# Patient Record
Sex: FEMALE | Race: WHITE | NOT HISPANIC OR LATINO | URBAN - METROPOLITAN AREA
[De-identification: names, ages, dates, MRNs, and addresses within clinical notes are randomized per-mention and may not be internally consistent; named-entity substitution may affect disease eponyms.]

---

## 2022-11-04 ENCOUNTER — EMERGENCY (EMERGENCY)
Facility: HOSPITAL | Age: 82
LOS: 0 days | Discharge: HOME | End: 2022-11-05
Attending: EMERGENCY MEDICINE
Payer: MEDICARE

## 2022-11-04 VITALS
DIASTOLIC BLOOD PRESSURE: 74 MMHG | OXYGEN SATURATION: 100 % | RESPIRATION RATE: 20 BRPM | HEART RATE: 67 BPM | TEMPERATURE: 98 F | SYSTOLIC BLOOD PRESSURE: 172 MMHG | WEIGHT: 134.92 LBS

## 2022-11-04 DIAGNOSIS — R51.9 HEADACHE, UNSPECIFIED: ICD-10-CM

## 2022-11-04 DIAGNOSIS — U07.1 COVID-19: ICD-10-CM

## 2022-11-04 DIAGNOSIS — R05.9 COUGH, UNSPECIFIED: ICD-10-CM

## 2022-11-04 PROCEDURE — 99283 EMERGENCY DEPT VISIT LOW MDM: CPT | Mod: CS

## 2022-11-04 PROCEDURE — 99282 EMERGENCY DEPT VISIT SF MDM: CPT

## 2022-11-04 NOTE — ED ADULT TRIAGE NOTE - CHIEF COMPLAINT QUOTE
Pt. presents to ED from home c/o cough, body aches, and headache since yesterday. Pt. states symptoms began yesterday. Pt. endorses + at home covid test today. Denies CP. Endorses "a little" SOB

## 2022-11-05 VITALS
SYSTOLIC BLOOD PRESSURE: 167 MMHG | HEART RATE: 65 BPM | RESPIRATION RATE: 18 BRPM | OXYGEN SATURATION: 100 % | DIASTOLIC BLOOD PRESSURE: 72 MMHG

## 2022-11-05 RX ORDER — ACETAMINOPHEN 500 MG
650 TABLET ORAL ONCE
Refills: 0 | Status: COMPLETED | OUTPATIENT
Start: 2022-11-05 | End: 2022-11-05

## 2022-11-05 RX ADMIN — Medication 650 MILLIGRAM(S): at 00:48

## 2022-11-05 NOTE — ED PROVIDER NOTE - NSFOLLOWUPINSTRUCTIONS_ED_ALL_ED_FT
please follow up with your doctor in 5 days.   please self isolate as directed.  take tylenol 650 mg every fours for fever and pain.   drink plenty of fluids.   return to ED for any concerns

## 2022-11-05 NOTE — ED PROVIDER NOTE - OBJECTIVE STATEMENT
81 y/o female in ED c/o cough, congestion, myalgia and HA x 2-3 days.   states took COVID test at home today and found to be positive.   family states pt with decrease appetite.  no sick contacts.   tolerating PO.   no meds taken for symptoms.

## 2022-11-05 NOTE — ED PROVIDER NOTE - CLINICAL SUMMARY MEDICAL DECISION MAKING FREE TEXT BOX
pt with uri symptoms testing positive for COVID at home.   well appearing.   VS stable.   will give tylenol and have f/u.    d/w family and agree with plan and will f/u

## 2022-11-05 NOTE — ED ADULT NURSE NOTE - OBJECTIVE STATEMENT
81 y/o female awake alert and oriented x4 presents to ED c/o headache x 2-3 days. + cough, congestion and chills. Tested positive with home covid test. Denies sick contact. Denies chest pain, sob, nausea, vomiting.

## 2022-11-05 NOTE — ED PROVIDER NOTE - PATIENT PORTAL LINK FT
You can access the FollowMyHealth Patient Portal offered by [facility] by registering at the following website: [email or web address]. By joining [facility]’s FollowMyHealth portal, you will also be able to view your health information using other applications (apps) compatible with our system.

## 2022-11-11 ENCOUNTER — INPATIENT (INPATIENT)
Facility: HOSPITAL | Age: 82
LOS: 12 days | Discharge: HOME HEALTH | DRG: 177 | End: 2022-11-24
Attending: FAMILY MEDICINE | Admitting: INTERNAL MEDICINE
Payer: MEDICARE

## 2022-11-11 VITALS
RESPIRATION RATE: 20 BRPM | OXYGEN SATURATION: 99 % | SYSTOLIC BLOOD PRESSURE: 130 MMHG | HEART RATE: 53 BPM | TEMPERATURE: 98 F | DIASTOLIC BLOOD PRESSURE: 59 MMHG

## 2022-11-11 DIAGNOSIS — U07.1 COVID-19: ICD-10-CM

## 2022-11-11 LAB
ALBUMIN SERPL ELPH-MCNC: 3.4 G/DL — SIGNIFICANT CHANGE UP (ref 3.3–5)
ALP SERPL-CCNC: 119 U/L — SIGNIFICANT CHANGE UP (ref 40–120)
ALT FLD-CCNC: 23 U/L — SIGNIFICANT CHANGE UP (ref 12–78)
ANION GAP SERPL CALC-SCNC: 10 MMOL/L — SIGNIFICANT CHANGE UP (ref 5–17)
AST SERPL-CCNC: 27 U/L — SIGNIFICANT CHANGE UP (ref 15–37)
BASOPHILS # BLD AUTO: 0.01 K/UL — SIGNIFICANT CHANGE UP (ref 0–0.2)
BASOPHILS NFR BLD AUTO: 0.3 % — SIGNIFICANT CHANGE UP (ref 0–2)
BILIRUB SERPL-MCNC: 0.5 MG/DL — SIGNIFICANT CHANGE UP (ref 0.2–1.2)
BUN SERPL-MCNC: 23 MG/DL — SIGNIFICANT CHANGE UP (ref 7–23)
CALCIUM SERPL-MCNC: 9 MG/DL — SIGNIFICANT CHANGE UP (ref 8.5–10.1)
CHLORIDE SERPL-SCNC: 106 MMOL/L — SIGNIFICANT CHANGE UP (ref 96–108)
CO2 SERPL-SCNC: 21 MMOL/L — LOW (ref 22–31)
CREAT SERPL-MCNC: 1.04 MG/DL — SIGNIFICANT CHANGE UP (ref 0.5–1.3)
EGFR: 53 ML/MIN/1.73M2 — LOW
EOSINOPHIL # BLD AUTO: 0 K/UL — SIGNIFICANT CHANGE UP (ref 0–0.5)
EOSINOPHIL NFR BLD AUTO: 0 % — SIGNIFICANT CHANGE UP (ref 0–6)
FLUAV AG NPH QL: SIGNIFICANT CHANGE UP
FLUBV AG NPH QL: SIGNIFICANT CHANGE UP
GLUCOSE SERPL-MCNC: 147 MG/DL — HIGH (ref 70–99)
HCT VFR BLD CALC: 37.7 % — SIGNIFICANT CHANGE UP (ref 34.5–45)
HGB BLD-MCNC: 13 G/DL — SIGNIFICANT CHANGE UP (ref 11.5–15.5)
IMM GRANULOCYTES NFR BLD AUTO: 0.3 % — SIGNIFICANT CHANGE UP (ref 0–0.9)
LACTATE SERPL-SCNC: 1.8 MMOL/L — SIGNIFICANT CHANGE UP (ref 0.7–2)
LYMPHOCYTES # BLD AUTO: 0.81 K/UL — LOW (ref 1–3.3)
LYMPHOCYTES # BLD AUTO: 25.3 % — SIGNIFICANT CHANGE UP (ref 13–44)
MAGNESIUM SERPL-MCNC: 2 MG/DL — SIGNIFICANT CHANGE UP (ref 1.6–2.6)
MCHC RBC-ENTMCNC: 28.3 PG — SIGNIFICANT CHANGE UP (ref 27–34)
MCHC RBC-ENTMCNC: 34.5 GM/DL — SIGNIFICANT CHANGE UP (ref 32–36)
MCV RBC AUTO: 82.1 FL — SIGNIFICANT CHANGE UP (ref 80–100)
MONOCYTES # BLD AUTO: 0.3 K/UL — SIGNIFICANT CHANGE UP (ref 0–0.9)
MONOCYTES NFR BLD AUTO: 9.4 % — SIGNIFICANT CHANGE UP (ref 2–14)
NEUTROPHILS # BLD AUTO: 2.07 K/UL — SIGNIFICANT CHANGE UP (ref 1.8–7.4)
NEUTROPHILS NFR BLD AUTO: 64.7 % — SIGNIFICANT CHANGE UP (ref 43–77)
PLATELET # BLD AUTO: 140 K/UL — LOW (ref 150–400)
POTASSIUM SERPL-MCNC: 3.8 MMOL/L — SIGNIFICANT CHANGE UP (ref 3.5–5.3)
POTASSIUM SERPL-SCNC: 3.8 MMOL/L — SIGNIFICANT CHANGE UP (ref 3.5–5.3)
PROT SERPL-MCNC: 7.5 GM/DL — SIGNIFICANT CHANGE UP (ref 6–8.3)
RBC # BLD: 4.59 M/UL — SIGNIFICANT CHANGE UP (ref 3.8–5.2)
RBC # FLD: 14.1 % — SIGNIFICANT CHANGE UP (ref 10.3–14.5)
RSV RNA NPH QL NAA+NON-PROBE: SIGNIFICANT CHANGE UP
SARS-COV-2 RNA SPEC QL NAA+PROBE: DETECTED
SODIUM SERPL-SCNC: 137 MMOL/L — SIGNIFICANT CHANGE UP (ref 135–145)
TROPONIN I, HIGH SENSITIVITY RESULT: 11.71 NG/L — SIGNIFICANT CHANGE UP
WBC # BLD: 3.2 K/UL — LOW (ref 3.8–10.5)
WBC # FLD AUTO: 3.2 K/UL — LOW (ref 3.8–10.5)

## 2022-11-11 PROCEDURE — 85027 COMPLETE CBC AUTOMATED: CPT

## 2022-11-11 PROCEDURE — 70450 CT HEAD/BRAIN W/O DYE: CPT

## 2022-11-11 PROCEDURE — 83615 LACTATE (LD) (LDH) ENZYME: CPT

## 2022-11-11 PROCEDURE — 87077 CULTURE AEROBIC IDENTIFY: CPT

## 2022-11-11 PROCEDURE — 94760 N-INVAS EAR/PLS OXIMETRY 1: CPT

## 2022-11-11 PROCEDURE — 85652 RBC SED RATE AUTOMATED: CPT

## 2022-11-11 PROCEDURE — 36600 WITHDRAWAL OF ARTERIAL BLOOD: CPT

## 2022-11-11 PROCEDURE — 99223 1ST HOSP IP/OBS HIGH 75: CPT

## 2022-11-11 PROCEDURE — 82962 GLUCOSE BLOOD TEST: CPT

## 2022-11-11 PROCEDURE — 84443 ASSAY THYROID STIM HORMONE: CPT

## 2022-11-11 PROCEDURE — 84439 ASSAY OF FREE THYROXINE: CPT

## 2022-11-11 PROCEDURE — 82803 BLOOD GASES ANY COMBINATION: CPT

## 2022-11-11 PROCEDURE — 93306 TTE W/DOPPLER COMPLETE: CPT

## 2022-11-11 PROCEDURE — 85610 PROTHROMBIN TIME: CPT

## 2022-11-11 PROCEDURE — 84480 ASSAY TRIIODOTHYRONINE (T3): CPT

## 2022-11-11 PROCEDURE — 80048 BASIC METABOLIC PNL TOTAL CA: CPT

## 2022-11-11 PROCEDURE — 93970 EXTREMITY STUDY: CPT

## 2022-11-11 PROCEDURE — 86140 C-REACTIVE PROTEIN: CPT

## 2022-11-11 PROCEDURE — 71045 X-RAY EXAM CHEST 1 VIEW: CPT | Mod: 26

## 2022-11-11 PROCEDURE — 82565 ASSAY OF CREATININE: CPT

## 2022-11-11 PROCEDURE — 97163 PT EVAL HIGH COMPLEX 45 MIN: CPT | Mod: GP

## 2022-11-11 PROCEDURE — 82728 ASSAY OF FERRITIN: CPT

## 2022-11-11 PROCEDURE — 99285 EMERGENCY DEPT VISIT HI MDM: CPT | Mod: CS

## 2022-11-11 PROCEDURE — 94761 N-INVAS EAR/PLS OXIMETRY MLT: CPT

## 2022-11-11 PROCEDURE — 97116 GAIT TRAINING THERAPY: CPT | Mod: GP

## 2022-11-11 PROCEDURE — 81001 URINALYSIS AUTO W/SCOPE: CPT

## 2022-11-11 PROCEDURE — 36415 COLL VENOUS BLD VENIPUNCTURE: CPT

## 2022-11-11 PROCEDURE — 80076 HEPATIC FUNCTION PANEL: CPT

## 2022-11-11 PROCEDURE — 87086 URINE CULTURE/COLONY COUNT: CPT

## 2022-11-11 PROCEDURE — 85379 FIBRIN DEGRADATION QUANT: CPT

## 2022-11-11 PROCEDURE — 71045 X-RAY EXAM CHEST 1 VIEW: CPT

## 2022-11-11 PROCEDURE — 87186 SC STD MICRODIL/AGAR DIL: CPT

## 2022-11-11 PROCEDURE — 71275 CT ANGIOGRAPHY CHEST: CPT

## 2022-11-11 RX ORDER — ENOXAPARIN SODIUM 100 MG/ML
40 INJECTION SUBCUTANEOUS EVERY 24 HOURS
Refills: 0 | Status: DISCONTINUED | OUTPATIENT
Start: 2022-11-11 | End: 2022-11-16

## 2022-11-11 RX ORDER — FAMOTIDINE 10 MG/ML
20 INJECTION INTRAVENOUS ONCE
Refills: 0 | Status: COMPLETED | OUTPATIENT
Start: 2022-11-11 | End: 2022-11-11

## 2022-11-11 RX ORDER — ACETAMINOPHEN 500 MG
650 TABLET ORAL EVERY 6 HOURS
Refills: 0 | Status: DISCONTINUED | OUTPATIENT
Start: 2022-11-11 | End: 2022-11-24

## 2022-11-11 RX ORDER — REMDESIVIR 5 MG/ML
INJECTION INTRAVENOUS
Refills: 0 | Status: COMPLETED | OUTPATIENT
Start: 2022-11-11 | End: 2022-11-15

## 2022-11-11 RX ORDER — ONDANSETRON 8 MG/1
4 TABLET, FILM COATED ORAL EVERY 8 HOURS
Refills: 0 | Status: DISCONTINUED | OUTPATIENT
Start: 2022-11-11 | End: 2022-11-24

## 2022-11-11 RX ORDER — DEXAMETHASONE 0.5 MG/5ML
6 ELIXIR ORAL DAILY
Refills: 0 | Status: DISCONTINUED | OUTPATIENT
Start: 2022-11-12 | End: 2022-11-15

## 2022-11-11 RX ORDER — HALOPERIDOL DECANOATE 100 MG/ML
5 INJECTION INTRAMUSCULAR ONCE
Refills: 0 | Status: DISCONTINUED | OUTPATIENT
Start: 2022-11-11 | End: 2022-11-13

## 2022-11-11 RX ORDER — REMDESIVIR 5 MG/ML
100 INJECTION INTRAVENOUS EVERY 24 HOURS
Refills: 0 | Status: COMPLETED | OUTPATIENT
Start: 2022-11-12 | End: 2022-11-15

## 2022-11-11 RX ORDER — LANOLIN ALCOHOL/MO/W.PET/CERES
3 CREAM (GRAM) TOPICAL AT BEDTIME
Refills: 0 | Status: DISCONTINUED | OUTPATIENT
Start: 2022-11-11 | End: 2022-11-24

## 2022-11-11 RX ORDER — ONDANSETRON 8 MG/1
4 TABLET, FILM COATED ORAL ONCE
Refills: 0 | Status: COMPLETED | OUTPATIENT
Start: 2022-11-11 | End: 2022-11-11

## 2022-11-11 RX ORDER — REMDESIVIR 5 MG/ML
200 INJECTION INTRAVENOUS EVERY 24 HOURS
Refills: 0 | Status: COMPLETED | OUTPATIENT
Start: 2022-11-11 | End: 2022-11-11

## 2022-11-11 RX ORDER — DEXAMETHASONE 0.5 MG/5ML
6 ELIXIR ORAL ONCE
Refills: 0 | Status: COMPLETED | OUTPATIENT
Start: 2022-11-11 | End: 2022-11-11

## 2022-11-11 RX ORDER — ACETAMINOPHEN 500 MG
650 TABLET ORAL ONCE
Refills: 0 | Status: COMPLETED | OUTPATIENT
Start: 2022-11-11 | End: 2022-11-11

## 2022-11-11 RX ORDER — SODIUM CHLORIDE 9 MG/ML
1000 INJECTION INTRAMUSCULAR; INTRAVENOUS; SUBCUTANEOUS ONCE
Refills: 0 | Status: COMPLETED | OUTPATIENT
Start: 2022-11-11 | End: 2022-11-11

## 2022-11-11 RX ADMIN — SODIUM CHLORIDE 1000 MILLILITER(S): 9 INJECTION INTRAMUSCULAR; INTRAVENOUS; SUBCUTANEOUS at 11:46

## 2022-11-11 RX ADMIN — Medication 650 MILLIGRAM(S): at 15:10

## 2022-11-11 RX ADMIN — FAMOTIDINE 20 MILLIGRAM(S): 10 INJECTION INTRAVENOUS at 11:36

## 2022-11-11 RX ADMIN — ONDANSETRON 4 MILLIGRAM(S): 8 TABLET, FILM COATED ORAL at 11:36

## 2022-11-11 RX ADMIN — REMDESIVIR 500 MILLIGRAM(S): 5 INJECTION INTRAVENOUS at 22:31

## 2022-11-11 RX ADMIN — SODIUM CHLORIDE 1000 MILLILITER(S): 9 INJECTION INTRAMUSCULAR; INTRAVENOUS; SUBCUTANEOUS at 11:43

## 2022-11-11 RX ADMIN — Medication 6 MILLIGRAM(S): at 11:36

## 2022-11-11 RX ADMIN — SODIUM CHLORIDE 1000 MILLILITER(S): 9 INJECTION INTRAMUSCULAR; INTRAVENOUS; SUBCUTANEOUS at 10:30

## 2022-11-11 RX ADMIN — SODIUM CHLORIDE 1000 MILLILITER(S): 9 INJECTION INTRAMUSCULAR; INTRAVENOUS; SUBCUTANEOUS at 12:25

## 2022-11-11 NOTE — ED PROVIDER NOTE - CLINICAL SUMMARY MEDICAL DECISION MAKING FREE TEXT BOX
Pt with 1 week of COVID, worsening generalized weakness, worsening oral intake, dehydration. O2 sat normal . Plan for IV fluids, symptom control, CXR, EKG, reassess.

## 2022-11-11 NOTE — ED ADULT TRIAGE NOTE - HISTORY OF COVID-19 VACCINATION
[facility] Occupational Therapy      Date: 2017  Patient Name: Zheng Walker        MRN: [id number]  Account: [de-identified]   : 1967  (45 y.o.)  Room: [place] [address]    Chart reviewed, attempted OT tx at 3:43 PM, but pt. unavailable 2° to:    [x] Hold per nsg eval on floor need secondary to new admit    [] Pt declined     [] Pt. . off floor for test/procedure. Will attempt again when able.     Electronically signed by MAXIMUS Otto/L on 2017 at 3:43 PM Vaccine status unknown

## 2022-11-11 NOTE — ED ADULT NURSE REASSESSMENT NOTE - NS ED NURSE REASSESS COMMENT FT1
Patient ambulated and became short of breath with 02 saturation down to 90 on room air. Patient placed on 2L nasal cannula per MD Villaseñor with reovery to 99%. Patient provided with meal at this time.

## 2022-11-11 NOTE — ED PROVIDER NOTE - PHYSICAL EXAMINATION
General: AAOx3, NAD  HEENT: NCAT  Cardiac: Normal rate and rhythym, normal peripheral perfusion,  systolic murmur at right sternal border  Respiratory: Normal rate and effort. Mild crackles bilaterally    GI: Soft, nondistended, nontender  Neuro: No focal deficits. RASMUSSEN equally x4, sensation to light touch intact throughout  MSK: FROMx4, no focal bony tenderness, no peripheral edema  Skin: No rash

## 2022-11-11 NOTE — ED ADULT NURSE REASSESSMENT NOTE - NS ED NURSE REASSESS COMMENT FT1
Spoke with patient again, still refusing to take the remdesevir and lovenox. Will continue to think about medication and speak with daughter. Report given to floor and made aware that she would like to have more time to consider taking the medication or not.

## 2022-11-11 NOTE — PATIENT PROFILE ADULT - FALL HARM RISK - HARM RISK INTERVENTIONS

## 2022-11-11 NOTE — ED PROVIDER NOTE - OBJECTIVE STATEMENT
83 y/o female with no PMHx  presents to the ED for increased generalized weakness. Pt was diagnosed with COVID 1 week ago at [facility] and came today after experiencing increased generalized weakness since leaving ED. Pt also reports that she is not eating or drinking well as well as a few episodes of NBNB vomiting. No abdominal pain, fever, SOB, CP. Not vaccinated for COVID. 81 y/o female with no PMHx  presents to the ED for increased generalized weakness. Pt was diagnosed with COVID 1 week ago at [facility] and came today after experiencing increased generalized weakness since leaving ED. Pt also reports that she is not eating or drinking well as well as a few episodes of NBNB vomiting. No abdominal pain, fever, SOB, CP. Not vaccinated for COVID. Pt is Belgian speaking but translation provided by daughter.

## 2022-11-11 NOTE — ED ADULT NURSE NOTE - NS PRO PASSIVE SMOKE EXP
No
Cuidado de un desgarro, en adultos    Laceration Care, Adult      Un desgarro es un jackie que puede atravesar todas las capas de la piel. El jackie también puede llegar al tejido que está debajo de la piel. Algunos go cicatrizan por sí solos. Otros se deben cerrar con puntos (suturas), grapas, tiras adhesivas para la piel o goma para cerrar la piel. Cuidar de la lesión reduce el riesgo de infección, ayuda a que la lesión se cure mejor, y puede prevenir la formación de cicatrices.      Materiales necesarios:    •Jabón.      •Agua.      •Desinfectante de gray.      •Venda (vendaje).      •Ungüento antibiótico.      •Toalla limpia.        Cómo cuidar del jackie    Lávese las gray con agua y jabón antes de tocarse la herida y cambiar la venda. Use desinfectante para gray si no dispone de agua y jabón.    Si el médico utilizó puntos o grapas:     •Mantenga la herida limpia y seca.      •Si le colocaron coco venda, cámbiela por lo menos coco vez por día según se lo haya indicado el médico. También debe cambiarla si se moja o se ensucia.      •Mantenga la herida completamente seca nael las primeras 24 horas o carlo se lo haya indicado el médico. Después, podrá ducharse o jackson un baño de inmersión. No sumerja la herida en agua hasta que le hayan quitado los puntos o las grapas.    •Limpie la herida coco vez por día o carlo se lo haya indicado el médico:  •Lave la herida con agua y jabón.      •Enjuáguela con agua para quitar todo el jabón.      •Séquela dando palmaditas con coco toalla limpia. No frote la herida.      •Después de limpiar la herida, aplique coco capa delgada de ungüento con antibiótico carlo se lo haya indicado el médico. Edna ungüento:  •Ayuda a prevenir coco infección.      •Stephanie que la venda se adhiera a la herida.        •Dennis que le retiren los puntos o las grapas carlo se lo haya indicado el médico.      Si el médico utilizó tiras adhesivas para la piel:     •Mantenga la herida limpia y seca.      •Si le colocaron coco venda, debe cambiarla por lo menos coco vez por día carlo se lo haya indicado el médico. También debe cambiarla si se moja o se ensucia.      • No deje que las tiras adhesivas para la piel se mojen. Puede bañarse o ducharse, tyron mantenga la herida seca.      •Si se moja, séquela dando palmaditas con coco toalla limpia. No frote la herida.      •Las tiras adhesivas para la piel se caen solas. Puede recortar las tiras a medida que la herida cicatriza. No quite las tiras que aún están pegadas a la herida. Se caerán después de un tiempo.      Si el médico utilizó goma para cerrar la piel:     •Trate de mantener la herida seca; sin embargo, puede mojarla ligeramente cuando se bañe o se duche. No sumerja la herida en el agua, por ejemplo, al nadar.      •Después de ducharse o bañarse, seque la herida con cuidado dando palmaditas con coco toalla limpia. No frote la herida.      • No practique actividades que lo yung transpirar mucho hasta que la goma para cerrar la piel se haya salido baldemar.      • No aplique líquidos, cremas ni ungüentos medicinales en la herida mientras esté la goma para cerrar la piel.      •Si le colocaron coco venda, debe cambiarla por lo menos coco vez por día o carlo se lo haya indicado el médico. También debe cambiarla si se ensucia o se moja.      •Si le colocan coco venda sobre la herida, no deje que la cinta toque la goma para cerrar la piel.      • No toque la goma. La goma para cerrar la piel suele permanecer en la piel de 5 a 10 días. Luego, se sale solo de la piel.        Indicaciones generales      •[place] los medicamentos de venta jing y los recetados solamente carlo se lo haya indicado el médico.      •Si le indicaron un ungüento o un medicamento con antibiótico, aplíquelo o tómelo carlo se lo haya dicho el médico. No deje de usarlo aunque la afección mejore.      • No se rasque ni se toque la herida.    •Controle la herida todos los días para detectar signos de infección. Esté atento a lo siguiente:  •Dolor, hinchazón o enrojecimiento.      •Líquido, sheridan o pus.        •Cuando esté sentado o acostado, levante (eleve) la safia de la lesión por encima del nivel del corazón.    •Si se lo indican, aplique hielo sobre la safia afectada:  •Ponga el hielo en coco bolsa plástica.      •Coloque coco toalla entre la piel y la bolsa de hielo.      •Coloque el hielo nael 20 minutos, 2 a 3 veces por día.        •Evite la formación de cicatrices aplicándose pantalla solar con factor de protección solar (FPS) de 30 sobre la herida, carlo mínimo, siempre que esté al aire jing después de que la herida haya cicatrizado.      •Concurra a todas las visitas de seguimiento carlo se lo haya indicado el médico. [place] es importante.        Solicite ayuda si:  •Recibió coco vacuna antitetánica y tiene cualquiera de estos problemas en el sitio de la inyección:  •Hinchazón.      •Dolor intenso.      •Enrojecimiento.      •Sangrado.        •Tiene fiebre.      •Coco herida que estaba cerrada y se abre.      •Percibe que sale mal olor de la herida o de la venda.      •Nota un cuerpo extraño en la herida, carlo un trozo de abbott o aby.      •Los medicamentos no le calman el dolor.      •Tiene más enrojecimiento, hinchazón o dolor en el lugar de la herida.      •Presenta líquido, sheridan o pus que provienen de la herida.      •Observa que la piel cerca de la herida cambia de color.      •Debe cambiar la venda con frecuencia debido a que hay secreción de líquido, sheridan o pus de la herida.      •Tiene coco erupción cutánea nueva.      •Comienza a tener adormecimiento alrededor de la herida.        Solicite ayuda de inmediato si:    •Hay mucha hinchazón alrededor de la herida.      •El dolor empeora repentinamente y es muy intenso.      •Percibe bultos dolorosos cerca de la herida o en cualquier parte del cuerpo.      •Tiene coco línea liliana que sale de la herida.    •La herida está en la mano o en el pie y:  •No puede  los dedos.      •Los dedos se joselin pálidos o azulados.          Resumen    •Un desgarro es un jackie que puede atravesar todas las capas de la piel. El jackie también puede llegar al tejido que está jimenez debajo de la piel.      •Algunos go cicatrizan por sí solos. Otros se deben cerrar con puntos, grapas, tiras adhesivas para la piel o goma para cerrar la piel.      •Siga las indicaciones del médico respecto del cuidado de freedman jackie. El cuidado adecuado de un jackie reduce el riesgo de infección, ayuda a que el jackie cierre mejor, y previene la formación de cicatrices.      Esta información no tiene carlo fin reemplazar el consejo del médico. Asegúrese de hacerle al médico cualquier pregunta que tenga.      Document Revised: 02/24/2019 Document Reviewed: 02/24/2019    Elsevier Patient Education © 2021 Elsevier Inc[employer]

## 2022-11-11 NOTE — H&P ADULT - NSHPPHYSICALEXAM_GEN_ALL_CORE
Vital Signs Last 24 Hrs  T(C): 36.4 (11 Nov 2022 15:05), Max: 36.4 (11 Nov 2022 09:14)  T(F): 97.6 (11 Nov 2022 15:05), Max: 97.6 (11 Nov 2022 15:05)  HR: 51 (11 Nov 2022 15:05) (51 - 53)  BP: 153/67 (11 Nov 2022 15:05) (130/59 - 153/67)  BP(mean): 85 (11 Nov 2022 15:05) (81 - 85)  RR: 19 (11 Nov 2022 15:05) (19 - 20)  SpO2: 98% (11 Nov 2022 15:05) (98% - 99%)    Parameters below as of 11 Nov 2022 15:05  Patient On (Oxygen Delivery Method): nasal cannula  O2 Flow (L/min): 2      PHYSICAL EXAM:      Constitutional: NAD  Eyes: perrl, no conjunctival changes  ENMT: no exudates, moist oral muc, uvula midline  Neck: no JVD, no LAD  Back: no cva tenderness  Respiratory: CTA, no exp wheezes  Cardiovascular: S1S2 reg, III/VI CISCO gallop or rub  Gastrointestinal: abd soft, NT/ND + BS  Genitourinary: voiding  Extremities: FROM, no joint effusions, no edema, no clubbing , no cyanosis  Vascular: pedal pulses + bilateral, warm extremities  Neurological: non focal, mot str 5/5/ all extr  Skin: no rashes  Lymph Nodes: no LAD

## 2022-11-11 NOTE — H&P ADULT - ASSESSMENT
*Acute hypoxic respiratory failure secondary to COVID   dexamethasone and remdesivir along with supplemental oxygen   DVT prophylaxis with low molecular weight heparin   patient has no past medical history and takes no medication. Case discussed with daughter no advance directives have been discussed with the patient she will address it later on    *  systolic ejection murmur suspect aortic stenosis   check TTE

## 2022-11-11 NOTE — ED PROVIDER NOTE - PROGRESS NOTE DETAILS
CXR with patchy infiltrates c/w covid pneumonitis. O2 sat initially normal, however on reeval decreased to 90-92. Ambulation trial showed significant dypsnea after short walk and O2 89-91. Already rec'd dexamthasone, will place on O2NC, tylenol for HA. Pt also with murmur that was not known previously, no recent syncope but may also bneed cardiac workup. WIll require admission

## 2022-11-11 NOTE — PROGRESS NOTE ADULT - ASSESSMENT
83 y/o female with no pmhx now with:    1. acute hypoxic respiratory failure  2. agitation  3. covid-19 pna    - lovenox subq for prophylaxis  - remdesevir and decadron  - supplemental o2 prn for spo2 > 92%      as stated above, patient lacks insight in to her care and more improtantly what refusal of care would mean (high risk of death due to hypoxemic cardiac arrest). family wishes to give medication without patient knowing, which i explained to them if patient is alert and oriented she can refuse. recommend psych consult to help assist with determining patient medical decision making capacity.     family to help reassure patient. does not require icu at this time as she is now calm and satting well on nasal cannula 81 y/o female with no pmhx now with:    1. acute hypoxic respiratory failure  2. agitation  3. covid-19 pna    - lovenox subq for prophylaxis  - remdesevir and decadron  - supplemental o2 prn for spo2 > 92%  - consider xanax prn, haldol prn      as stated above, patient lacks insight in to her care and more improtantly what refusal of care would mean (high risk of death due to hypoxemic cardiac arrest). family wishes to give medication without patient knowing, which i explained to them if patient is alert and oriented she can refuse. recommend psych consult to help assist with determining patient medical decision making capacity.     family to help reassure patient. does not require icu at this time as she is now calm and satting well on nasal cannula

## 2022-11-11 NOTE — ED PROVIDER NOTE - NS ED ROS FT
Constitutional: No fevers, chills, or sweats. (+) generalized weakness  Cardiac: No chest pain, exertional dyspnea, orthopnea  Respiratory: No shortness of breath, no cough  GI: (+) vomiting  Neuro: No headaches, no neck pain/stiffness, no numbness  All other systems reviewed and are negative unless otherwise stated in the HPI.

## 2022-11-11 NOTE — H&P ADULT - HISTORY OF PRESENT ILLNESS
81 y/o female with no PMHx  presents to the ED for increased generalized weakness. Pt was diagnosed with COVID 1 week ago at [facility] and came today after experiencing increased generalized weakness since leaving ED. Pt also reports that she is not eating or drinking well as well as a few episodes of NBNB vomiting. No abdominal pain, fever, SOB, CP. Not vaccinated for COVID. Pt is Martiniquais speaking but translation provided by daughter.  Pt hypoxic in to 90%, abnormal CXR, started on Dexa and Remdesivir adm for further Rx.

## 2022-11-11 NOTE — PHARMACOTHERAPY INTERVENTION NOTE - COMMENTS
Med history complete, reviewed medications and allergies with patients daughter via phone [phone] and confirmed medication list with doctor first med profile, patient does not currently take any medication, all medication related questions answered

## 2022-11-11 NOTE — ED ADULT NURSE NOTE - OBJECTIVE STATEMENT
Patient presents to the emergency room with complaints of weakness. Patient alert, oriented, daughter Sofie translating for patient stating patient has been nauseous, vomiting, poor po intake for four days, increased weakness, and has tested positive for Covid last Friday. Patient not vaccinated. Patient denies pain, shortness of breath, chest pain at this time.

## 2022-11-11 NOTE — PROGRESS NOTE ADULT - SUBJECTIVE AND OBJECTIVE BOX
Patient is a 82y old  Female who presents with a chief complaint of covid (11 Nov 2022 16:14)      BRIEF HOSPITAL COURSE: 83 y/o female with no pmhx who presented with generalized weakness diagnosed with covid-19 1 week ago. Guatemalan speaking. admitted to medicine with hypoxemia stable on 3 liters NC.    tonight code grey --> rapid response called for severe agitation. patient removing oxygen and desatted to 82%. kicking and hitting staff. reuiring manual restraint for patient and staff safety.     gave 5 mg haldol and 1 mg ativan with improvement though remains agitated at times with certain. people. daughter notified and came back in to assist with improvement. also explained to patient with use of Togolese  (pacific interpreters) the clnical situation. patient explained that she does not want any medications, does not trust us, and says we are keeping her against her will. while she is alert and oriented, she lacks insight in to her refusal of care. placed on NRB with improvement, then subsequently transitioned back to nasal cannula successfully.       PAST MEDICAL & SURGICAL HISTORY:  No pertinent past medical history      No significant past surgical history          Review of Systems:  CONSTITUTIONAL: No fever, chills, or fatigue  EYES: No eye pain, visual disturbances, or discharge  ENMT:  No difficulty hearing, tinnitus, vertigo; No sinus or throat pain  NECK: No pain or stiffness  RESPIRATORY: No cough, wheezing, chills or hemoptysis; No shortness of breath  CARDIOVASCULAR: No chest pain, palpitations, dizziness, or leg swelling  GASTROINTESTINAL: No abdominal or epigastric pain. No nausea, vomiting, or hematemesis; No diarrhea or constipation. No melena or hematochezia.  GENITOURINARY: No dysuria, frequency, hematuria, or incontinence  NEUROLOGICAL: No headaches, memory loss, loss of strength, numbness, or tremors  SKIN: No itching, burning, rashes, or lesions   MUSCULOSKELETAL: No joint pain or swelling; No muscle, back, or extremity pain  PSYCHIATRIC: No depression, + anxiety, no mood swings, or difficulty sleeping      Medications:  remdesivir  IVPB 200 milliGRAM(s) IV Intermittent every 24 hours  remdesivir  IVPB   IV Intermittent         acetaminophen     Tablet .. 650 milliGRAM(s) Oral every 6 hours PRN  haloperidol    Injectable 5 milliGRAM(s) IntraMuscular once  LORazepam   Injectable 2 milliGRAM(s) IV Push once  melatonin 3 milliGRAM(s) Oral at bedtime PRN  ondansetron Injectable 4 milliGRAM(s) IV Push every 8 hours PRN      enoxaparin Injectable 40 milliGRAM(s) SubCutaneous every 24 hours    aluminum hydroxide/magnesium hydroxide/simethicone Suspension 30 milliLiter(s) Oral every 4 hours PRN                      ICU Vital Signs Last 24 Hrs  T(C): 36.7 (11 Nov 2022 18:30), Max: 36.7 (11 Nov 2022 18:30)  T(F): 98 (11 Nov 2022 18:30), Max: 98 (11 Nov 2022 18:30)  HR: 62 (11 Nov 2022 18:30) (51 - 62)  BP: 133/69 (11 Nov 2022 18:30) (130/59 - 153/67)  BP(mean): 85 (11 Nov 2022 15:05) (81 - 85)  ABP: --  ABP(mean): --  RR: 12 (11 Nov 2022 18:30) (12 - 20)  SpO2: 98% (11 Nov 2022 18:30) (98% - 99%)    O2 Parameters below as of 11 Nov 2022 18:30  Patient On (Oxygen Delivery Method): nasal cannula  O2 Flow (L/min): 2              I&O's Detail        LABS:                        13.0   3.20  )-----------( 140      ( 11 Nov 2022 09:51 )             37.7     11-11    137  |  106  |  23  ----------------------------<  147<H>  3.8   |  21<L>  |  1.04    Ca    9.0      11 Nov 2022 09:51  Mg     2.0     11-11    TPro  7.5  /  Alb  3.4  /  TBili  0.5  /  DBili  x   /  AST  27  /  ALT  23  /  AlkPhos  119  11-11          CAPILLARY BLOOD GLUCOSE            CULTURES:      Physical Examination:    General: agitated, combative     HEENT: Pupils equal, reactive to light.  Symmetric.    PULM: Clear to auscultation bilaterally, no significant sputum production    NECK: Supple, no lymphadenopathy, trachea midline    CVS: sinus tachycardia, no murmurs, rubs, or gallops    ABD: Soft, nondistended, nontender, normoactive bowel sounds, no masses    EXT: No edema, nontender    SKIN: Warm and well perfused, no rashes noted.    NEURO: Alert, oriented, interactive, nonfocal. severely agitated difficult to redirect      RADIOLOGY:       ACC: [id number] EXAM:  XR CHEST PORTABLE URGENT 1V                          PROCEDURE DATE:  11/11/2022          INTERPRETATION:  AP erect chest on November 11, 2022 at 9:36 AM. Patient   has Covid.    Heart magnified by technique.    Mild right lower thoracic curve again noted.    Present film shows a few small scattered infiltrates consistent with   Covid. Infiltrates are new since March 22, 2015.    IMPRESSION: Small scattered infiltrates.    --- End of Report ---            WHITNEY CAMPO MD; Attending Radiologist  This document has been electronically signed. Nov 11 2022 12:17PM

## 2022-11-11 NOTE — H&P ADULT - NSHPLABSRESULTS_GEN_ALL_CORE
13.0   3.20  )-----------( 140      ( 11 Nov 2022 09:51 )             37.7   11-11    137  |  106  |  23  ----------------------------<  147<H>  3.8   |  21<L>  |  1.04    Ca    9.0      11 Nov 2022 09:51  Mg     2.0     11-11    TPro  7.5  /  Alb  3.4  /  TBili  0.5  /  DBili  x   /  AST  27  /  ALT  23  /  AlkPhos  119  11-11       Xray Chest 1 View- PORTABLE-Urgent (11.11.22 @ 09:43) >    Mild right lower thoracic curve again noted.    Present film shows a few small scattered infiltrates consistent with   Covid. Infiltrates are new since March 22, 2015.    IMPRESSION: Small scattered infiltrates.        ekg NSR

## 2022-11-12 LAB
ANION GAP SERPL CALC-SCNC: 7 MMOL/L — SIGNIFICANT CHANGE UP (ref 5–17)
APPEARANCE UR: CLEAR — SIGNIFICANT CHANGE UP
BILIRUB UR-MCNC: NEGATIVE — SIGNIFICANT CHANGE UP
BUN SERPL-MCNC: 24 MG/DL — HIGH (ref 7–23)
CALCIUM SERPL-MCNC: 8.6 MG/DL — SIGNIFICANT CHANGE UP (ref 8.5–10.1)
CHLORIDE SERPL-SCNC: 112 MMOL/L — HIGH (ref 96–108)
CO2 SERPL-SCNC: 22 MMOL/L — SIGNIFICANT CHANGE UP (ref 22–31)
COLOR SPEC: YELLOW — SIGNIFICANT CHANGE UP
CREAT SERPL-MCNC: 0.83 MG/DL — SIGNIFICANT CHANGE UP (ref 0.5–1.3)
DIFF PNL FLD: NEGATIVE — SIGNIFICANT CHANGE UP
EGFR: 70 ML/MIN/1.73M2 — SIGNIFICANT CHANGE UP
GLUCOSE SERPL-MCNC: 123 MG/DL — HIGH (ref 70–99)
GLUCOSE UR QL: NEGATIVE — SIGNIFICANT CHANGE UP
HCT VFR BLD CALC: 36.3 % — SIGNIFICANT CHANGE UP (ref 34.5–45)
HGB BLD-MCNC: 12.4 G/DL — SIGNIFICANT CHANGE UP (ref 11.5–15.5)
INR BLD: 1.03 RATIO — SIGNIFICANT CHANGE UP (ref 0.88–1.16)
KETONES UR-MCNC: ABNORMAL
LEUKOCYTE ESTERASE UR-ACNC: ABNORMAL
MCHC RBC-ENTMCNC: 28 PG — SIGNIFICANT CHANGE UP (ref 27–34)
MCHC RBC-ENTMCNC: 34.2 GM/DL — SIGNIFICANT CHANGE UP (ref 32–36)
MCV RBC AUTO: 81.9 FL — SIGNIFICANT CHANGE UP (ref 80–100)
NITRITE UR-MCNC: POSITIVE
PH UR: 6 — SIGNIFICANT CHANGE UP (ref 5–8)
PLATELET # BLD AUTO: 141 K/UL — LOW (ref 150–400)
POTASSIUM SERPL-MCNC: 3.6 MMOL/L — SIGNIFICANT CHANGE UP (ref 3.5–5.3)
POTASSIUM SERPL-SCNC: 3.6 MMOL/L — SIGNIFICANT CHANGE UP (ref 3.5–5.3)
PROT UR-MCNC: SIGNIFICANT CHANGE UP MG/DL
PROTHROM AB SERPL-ACNC: 11.9 SEC — SIGNIFICANT CHANGE UP (ref 10.5–13.4)
RBC # BLD: 4.43 M/UL — SIGNIFICANT CHANGE UP (ref 3.8–5.2)
RBC # FLD: 14.1 % — SIGNIFICANT CHANGE UP (ref 10.3–14.5)
SODIUM SERPL-SCNC: 141 MMOL/L — SIGNIFICANT CHANGE UP (ref 135–145)
SP GR SPEC: 1.01 — SIGNIFICANT CHANGE UP (ref 1.01–1.02)
UROBILINOGEN FLD QL: 4
WBC # BLD: 3.68 K/UL — LOW (ref 3.8–10.5)
WBC # FLD AUTO: 3.68 K/UL — LOW (ref 3.8–10.5)

## 2022-11-12 PROCEDURE — 99233 SBSQ HOSP IP/OBS HIGH 50: CPT

## 2022-11-12 PROCEDURE — 93306 TTE W/DOPPLER COMPLETE: CPT | Mod: 26

## 2022-11-12 RX ORDER — ALBUTEROL 90 UG/1
2 AEROSOL, METERED ORAL EVERY 6 HOURS
Refills: 0 | Status: DISCONTINUED | OUTPATIENT
Start: 2022-11-12 | End: 2022-11-24

## 2022-11-12 RX ADMIN — Medication 6 MILLIGRAM(S): at 09:53

## 2022-11-12 RX ADMIN — ENOXAPARIN SODIUM 40 MILLIGRAM(S): 100 INJECTION SUBCUTANEOUS at 09:53

## 2022-11-12 RX ADMIN — Medication 1200 MILLIGRAM(S): at 21:40

## 2022-11-12 RX ADMIN — REMDESIVIR 500 MILLIGRAM(S): 5 INJECTION INTRAVENOUS at 21:39

## 2022-11-12 NOTE — PROGRESS NOTE ADULT - SUBJECTIVE AND OBJECTIVE BOX
*Acute hypoxic respiratory failure secondary to COVID   dexamethasone and remdesivir along with supplemental oxygen   DVT prophylaxis with low molecular weight heparin   patient has no past medical history and takes no medication. Case discussed with daughter no advance directives have been discussed with the patient she will address it later on    *  systolic ejection murmur suspect aortic stenosis   check TTE CC: covid     HPI: 81 y/o female with no PMHx  presented  to the ED for increased generalized weakness. Pt was diagnosed with COVID 1 week ago at [facility] and came today after experiencing increased generalized weakness since leaving ED. Pt also reports that she is not eating or drinking well as well as a few episodes of NBNB vomiting. No abdominal pain, fever, SOB, CP. Not vaccinated for COVID. Pt is [place] citizen speaking but translation provided by daughter.  Pt hypoxic in to 90%, abnormal CXR, started on Dexa and Remdesivir adm for further Rx.     INTERVAL HPI/ OVERNIGHT EVENTS: chart reviewed, Overnight events noted, Pt was seen and examined,  states that feels embarrassed about yesterdays episode.  Reports feels overall better, but cant provide details, states that doesn't feel SOB,   no CP, no fevers or chills.  + Cough, has poor appetite. Was able to convince to order dinner. Pt denies difficulty urinating     Vital Signs Last 24 Hrs  T(C): 36.4 (2022 16:34), Max: 36.7 (2022 18:30)  T(F): 97.6 (2022 16:34), Max: 98 (2022 18:30)  HR: 62 (2022 16:34) (57 - 62)  BP: 137/65 (2022 16:34) (106/52 - 137/65)  BP(mean): 3 (2022 08:59) (3 - 3)  RR: 18 (2022 16:34) (12 - 18)  SpO2: 94% (2022 16:34) (94% - 100%)    Parameters below as of 2022 16:34  Patient On (Oxygen Delivery Method): nasal cannula  O2 Flow (L/min): 3        REVIEW OF SYSTEMS:  All other review of systems is negative unless indicated above.      PHYSICAL EXAM:  General: Well developed; frail elderly female, ; in no acute distress on NC   Eyes:  EOMI; conjunctiva and sclera clear  Head: Normocephalic; atraumatic  ENMT: No nasal discharge; airway clear  Neck: Supple; non tender; no masses  Respiratory: Decreased BS, RLL crackles, No wheezes  Cardiovascular: Regular rate and rhythm. S1 and S2 Normal;   Gastrointestinal: Soft non-tender non-distended; Normal bowel sounds  Genitourinary: No  suprapubic  tenderness  Extremities: No edema  Vascular: Peripheral pulses palpable 2+ bilaterally  Neurological: Alert and oriented x 2-3, mildly confused, speech is clear, No facial asymmetry, no pronator drift   Skin: Warm and dry. No acute rash  Musculoskeletal: Normal muscle tone, without deformities  Psychiatric: Cooperative and appropriate        LABS:                         12.4   3.68  )-----------( 141      ( 2022 08:49 )             36.3     2022 08:49    141    |  112    |  24     ----------------------------<  123    3.6     |  22     |  0.83     Ca    8.6        2022 08:49  Mg     2.0       2022 09:51    TPro  6.4    /  Alb  2.9    /  TBili  0.3    /  DBili  0.1    /  AST  31     /  ALT  22     /  AlkPhos  97     2022 08:49    PT/INR - ( 2022 08:49 )   PT: 11.9 sec;   INR: 1.03 ratio        LIVER FUNCTIONS - ( 2022 08:49 )  Alb: 2.9 g/dL / Pro: 6.4 gm/dL / ALK PHOS: 97 U/L / ALT: 22 U/L / AST: 31 U/L / GGT: x           Urinalysis Basic - ( 2022 12:00 )    Color: Yellow / Appearance: Clear / S.015 / pH: x  Gluc: x / Ketone: Small  / Bili: Negative / Urobili: 4   Blood: x / Protein: Trace mg/dL / Nitrite: Positive   Leuk Esterase: Trace / RBC: 0-2 /HPF / WBC 6-10   Sq Epi: x / Non Sq Epi: Occasional / Bacteria: Many        MEDICATIONS  (STANDING):  dexAMETHasone     Tablet 6 milliGRAM(s) Oral daily  enoxaparin Injectable 40 milliGRAM(s) SubCutaneous every 24 hours  haloperidol    Injectable 5 milliGRAM(s) IntraMuscular once  LORazepam   Injectable 2 milliGRAM(s) IV Push once  remdesivir  IVPB 100 milliGRAM(s) IV Intermittent every 24 hours  remdesivir  IVPB   IV Intermittent     MEDICATIONS  (PRN):  acetaminophen     Tablet .. 650 milliGRAM(s) Oral every 6 hours PRN Temp greater or equal to 38C (100.4F), Mild Pain (1 - 3)  aluminum hydroxide/magnesium hydroxide/simethicone Suspension 30 milliLiter(s) Oral every 4 hours PRN Dyspepsia  melatonin 3 milliGRAM(s) Oral at bedtime PRN Insomnia  ondansetron Injectable 4 milliGRAM(s) IV Push every 8 hours PRN Nausea and/or Vomiting      RADIOLOGY & ADDITIONAL TESTS:    ACC: [id number] EXAM:  XR CHEST PORTABLE URGENT 1V                          PROCEDURE DATE:  2022          INTERPRETATION:  AP erect chest on 2022 at 9:36 AM. Patient   has Covid.    Heart magnified by technique.    Mild right lower thoracic curve again noted.    Present film shows a few small scattered infiltrates consistent with   Covid. Infiltrates are new since 2015.    IMPRESSION: Small scattered infiltrates.

## 2022-11-12 NOTE — PROGRESS NOTE ADULT - ASSESSMENT
*Acute hypoxic respiratory failure secondary to COVID19 PNA  Monitor pulse ox, supplement via NC  C/w  Dexamethasone and Remdesivir  Day 2   Add Mucinex and Albuterol PRN     * Agitation Confusion, likely due to metabolic encephalopathy 2/2 infection and dehydration also found to be hypoxic   supportive care   S/p IVF   S/p haldol, may use PRN     * Nausea and Vomiting, poor oral intake   possibly related to gastritis vs GERD  Start Pantoprazole  Zofran PRN  If still vomiting, will need imaging     * Abnormal UA  Not symptomatic  Will send UCX  Monitor for now       * Systolic ejection murmur  TTE ordered      * Sinus Bradycardia  Was in 40s in ED  C/w Tele: Marcella, was in 40s in am, but improved to 50-60s during the day   Check TSH  ECHO ordered       *  DVT prophylaxis with low molecular weight heparin

## 2022-11-13 LAB
ANION GAP SERPL CALC-SCNC: 11 MMOL/L — SIGNIFICANT CHANGE UP (ref 5–17)
BUN SERPL-MCNC: 25 MG/DL — HIGH (ref 7–23)
CALCIUM SERPL-MCNC: 9 MG/DL — SIGNIFICANT CHANGE UP (ref 8.5–10.1)
CHLORIDE SERPL-SCNC: 108 MMOL/L — SIGNIFICANT CHANGE UP (ref 96–108)
CO2 SERPL-SCNC: 20 MMOL/L — LOW (ref 22–31)
CREAT SERPL-MCNC: 0.92 MG/DL — SIGNIFICANT CHANGE UP (ref 0.5–1.3)
D DIMER BLD IA.RAPID-MCNC: 336 NG/ML DDU — HIGH
EGFR: 62 ML/MIN/1.73M2 — SIGNIFICANT CHANGE UP
GLUCOSE SERPL-MCNC: 147 MG/DL — HIGH (ref 70–99)
HCT VFR BLD CALC: 40.4 % — SIGNIFICANT CHANGE UP (ref 34.5–45)
HGB BLD-MCNC: 13.3 G/DL — SIGNIFICANT CHANGE UP (ref 11.5–15.5)
INR BLD: 1.14 RATIO — SIGNIFICANT CHANGE UP (ref 0.88–1.16)
MCHC RBC-ENTMCNC: 27.1 PG — SIGNIFICANT CHANGE UP (ref 27–34)
MCHC RBC-ENTMCNC: 32.9 GM/DL — SIGNIFICANT CHANGE UP (ref 32–36)
MCV RBC AUTO: 82.4 FL — SIGNIFICANT CHANGE UP (ref 80–100)
PLATELET # BLD AUTO: 191 K/UL — SIGNIFICANT CHANGE UP (ref 150–400)
POTASSIUM SERPL-MCNC: 3.3 MMOL/L — LOW (ref 3.5–5.3)
POTASSIUM SERPL-SCNC: 3.3 MMOL/L — LOW (ref 3.5–5.3)
PROTHROM AB SERPL-ACNC: 13.2 SEC — SIGNIFICANT CHANGE UP (ref 10.5–13.4)
RBC # BLD: 4.9 M/UL — SIGNIFICANT CHANGE UP (ref 3.8–5.2)
RBC # FLD: 14.1 % — SIGNIFICANT CHANGE UP (ref 10.3–14.5)
SODIUM SERPL-SCNC: 139 MMOL/L — SIGNIFICANT CHANGE UP (ref 135–145)
TSH SERPL-MCNC: 8.02 UU/ML — HIGH (ref 0.34–4.82)
WBC # BLD: 5.58 K/UL — SIGNIFICANT CHANGE UP (ref 3.8–10.5)
WBC # FLD AUTO: 5.58 K/UL — SIGNIFICANT CHANGE UP (ref 3.8–10.5)

## 2022-11-13 PROCEDURE — 99233 SBSQ HOSP IP/OBS HIGH 50: CPT

## 2022-11-13 RX ORDER — QUETIAPINE FUMARATE 200 MG/1
25 TABLET, FILM COATED ORAL ONCE
Refills: 0 | Status: COMPLETED | OUTPATIENT
Start: 2022-11-13 | End: 2022-11-13

## 2022-11-13 RX ORDER — HALOPERIDOL DECANOATE 100 MG/ML
1 INJECTION INTRAMUSCULAR ONCE
Refills: 0 | Status: COMPLETED | OUTPATIENT
Start: 2022-11-13 | End: 2022-11-13

## 2022-11-13 RX ORDER — QUETIAPINE FUMARATE 200 MG/1
25 TABLET, FILM COATED ORAL AT BEDTIME
Refills: 0 | Status: DISCONTINUED | OUTPATIENT
Start: 2022-11-13 | End: 2022-11-24

## 2022-11-13 RX ORDER — HALOPERIDOL DECANOATE 100 MG/ML
1 INJECTION INTRAMUSCULAR EVERY 6 HOURS
Refills: 0 | Status: DISCONTINUED | OUTPATIENT
Start: 2022-11-13 | End: 2022-11-20

## 2022-11-13 RX ADMIN — REMDESIVIR 500 MILLIGRAM(S): 5 INJECTION INTRAVENOUS at 22:13

## 2022-11-13 RX ADMIN — Medication 6 MILLIGRAM(S): at 09:24

## 2022-11-13 RX ADMIN — Medication 1200 MILLIGRAM(S): at 09:19

## 2022-11-13 RX ADMIN — ENOXAPARIN SODIUM 40 MILLIGRAM(S): 100 INJECTION SUBCUTANEOUS at 09:25

## 2022-11-13 RX ADMIN — HALOPERIDOL DECANOATE 1 MILLIGRAM(S): 100 INJECTION INTRAMUSCULAR at 09:49

## 2022-11-13 RX ADMIN — Medication 1 MILLIGRAM(S): at 05:51

## 2022-11-13 RX ADMIN — QUETIAPINE FUMARATE 25 MILLIGRAM(S): 200 TABLET, FILM COATED ORAL at 18:35

## 2022-11-13 NOTE — PROGRESS NOTE ADULT - ASSESSMENT
*Acute hypoxic respiratory failure secondary to COVID19 PNA  Monitor pulse ox, supplement via NC  C/w  Dexamethasone and Remdesivir  Day 3  C/w  Mucinex and Albuterol PRN     * Agitation Confusion, likely due to metabolic encephalopathy and delirium  2/2 infection and dehydration   supportive care   Seroquel QHS  Haldol  IM PRN   supportive care     * Nausea and Vomiting, poor oral intake   possibly related to gastritis vs GERD  C/w Pantoprazole  Zofran PRN  Tolerates some PO, no N/V    * Abnormal UA  Not symptomatic  F/u UCX  Monitor for now       * Systolic ejection murmur  ECHO: Mild to mod AS, EF 60-65%      * Sinus Bradycardia  Was in 40s in ED  C/w Tele: SR-Sbrady, was in 40s in am, but improved to 50-60s during the day   Check TSH  elevated, check T3/4  ECHO: EF 65%       *  DVT prophylaxis with low molecular weight heparin

## 2022-11-13 NOTE — PROGRESS NOTE ADULT - SUBJECTIVE AND OBJECTIVE BOX
CC: covid     HPI: 81 y/o female with no PMHx  presented  to the ED for increased generalized weakness. Pt was diagnosed with COVID 1 week ago at [facility] and came today after experiencing increased generalized weakness since leaving ED. Pt also reports that she is not eating or drinking well as well as a few episodes of NBNB vomiting. No abdominal pain, fever, SOB, CP. Not vaccinated for COVID. Pt is [place] speaking but translation provided by daughter.  Pt hypoxic in to 90%, abnormal CXR, started on Dexa and Remdesivir adm for further Rx.     INTERVAL HPI/ OVERNIGHT EVENTS:  Pt was seen and examined,  this am, sitting  on the floor, refused previously to get back to bed as per RN, ws confused and agitated overnight. On conversation pt confused, irrational, " let me go home for 30min" , cant explain reason. Was able to convince Pt to get back to bed,   had water and agreed to order breakfast. Pts Family contacted, who arrived shortly. Pt is desatting on exertion on RA         Vital Signs Last 24 Hrs  T(C): 36.4 (2022 16:34), Max: 36.7 (2022 18:30)  T(F): 97.6 (2022 16:34), Max: 98 (2022 18:30)  HR: 62 (2022 16:34) (57 - 62)  BP: 137/65 (2022 16:34) (106/52 - 137/65)  BP(mean): 3 (2022 08:59) (3 - 3)  RR: 18 (2022 16:34) (12 - 18)  SpO2: 94% (2022 16:34) (94% - 100%)    Parameters below as of 2022 16:34  Patient On (Oxygen Delivery Method): nasal cannula  O2 Flow (L/min): 3        REVIEW OF SYSTEMS:  All other review of systems is negative unless indicated above.      PHYSICAL EXAM:  General: Well developed; frail elderly female, agitated   Eyes:  EOMI; conjunctiva and sclera clear  Head: Normocephalic; atraumatic  ENMT: No nasal discharge; airway clear  Neck: Supple; non tender; no masses  Respiratory: Decreased BS,  rhonchi, No wheezes  Cardiovascular: Regular rate and rhythm. S1 and S2 Normal; + murmur  Gastrointestinal: Soft non-tender non-distended; Normal bowel sounds  Genitourinary: No  suprapubic  tenderness  Extremities: No edema  Vascular: Peripheral pulses palpable 2+ bilaterally  Neurological: Alert and oriented x 2, confused, speech is clear, No facial asymmetry, no pronator drift   Skin: Warm and dry. No acute rash  Musculoskeletal: Normal muscle tone, without deformities          LABS:                         13.3   5.58  )-----------( 191      ( 2022 08:56 )             40.4     11-    139  |  108  |  25<H>  ----------------------------<  147<H>  3.3<L>   |  20<L>  |  0.92    Ca    9.0      2022 08:56    TPro  7.5  /  Alb  3.3  /  TBili  0.5  /  DBili  0.2  /  AST  33  /  ALT  22  /  AlkPhos  113          LIVER FUNCTIONS - ( 2022 08:56 )  Alb: 3.3 g/dL / Pro: 7.5 gm/dL / ALK PHOS: 113 U/L / ALT: 22 U/L / AST: 33 U/L / GGT: x           PT/INR - ( 2022 08:56 )   PT: 13.2 sec;   INR: 1.14 ratio                              12.4   3.68  )-----------( 141      ( 2022 08:49 )             36.3     2022 08:49    141    |  112    |  24     ----------------------------<  123    3.6     |  22     |  0.83     Ca    8.6        2022 08:49  Mg     2.0       2022 09:51    TPro  6.4    /  Alb  2.9    /  TBili  0.3    /  DBili  0.1    /  AST  31     /  ALT  22     /  AlkPhos  97     2022 08:49    PT/INR - ( 2022 08:49 )   PT: 11.9 sec;   INR: 1.03 ratio        LIVER FUNCTIONS - ( 2022 08:49 )  Alb: 2.9 g/dL / Pro: 6.4 gm/dL / ALK PHOS: 97 U/L / ALT: 22 U/L / AST: 31 U/L / GGT: x           Urinalysis Basic - ( 2022 12:00 )    Color: Yellow / Appearance: Clear / S.015 / pH: x  Gluc: x / Ketone: Small  / Bili: Negative / Urobili: 4   Blood: x / Protein: Trace mg/dL / Nitrite: Positive   Leuk Esterase: Trace / RBC: 0-2 /HPF / WBC 6-10   Sq Epi: x / Non Sq Epi: Occasional / Bacteria: Many        MEDICATIONS  (STANDING):  dexAMETHasone     Tablet 6 milliGRAM(s) Oral daily  enoxaparin Injectable 40 milliGRAM(s) SubCutaneous every 24 hours  guaiFENesin ER 1200 milliGRAM(s) Oral every 12 hours  remdesivir  IVPB 100 milliGRAM(s) IV Intermittent every 24 hours  remdesivir  IVPB   IV Intermittent     MEDICATIONS  (PRN):  acetaminophen     Tablet .. 650 milliGRAM(s) Oral every 6 hours PRN Temp greater or equal to 38C (100.4F), Mild Pain (1 - 3)  albuterol    90 MICROgram(s) HFA Inhaler 2 Puff(s) Inhalation every 6 hours PRN Shortness of Breath and/or Wheezing  aluminum hydroxide/magnesium hydroxide/simethicone Suspension 30 milliLiter(s) Oral every 4 hours PRN Dyspepsia  haloperidol    Injectable 1 milliGRAM(s) IntraMuscular every 6 hours PRN agittion  melatonin 3 milliGRAM(s) Oral at bedtime PRN Insomnia  ondansetron Injectable 4 milliGRAM(s) IV Push every 8 hours PRN Nausea and/or Vomiting      RADIOLOGY & ADDITIONAL TESTS:    ACC: [id number] EXAM:  XR CHEST PORTABLE URGENT 1V                          PROCEDURE DATE:  2022          INTERPRETATION:  AP erect chest on 2022 at 9:36 AM. Patient   has Covid.    Heart magnified by technique.    Mild right lower thoracic curve again noted.    Present film shows a few small scattered infiltrates consistent with   Covid. Infiltrates are new since 2015.    IMPRESSION: Small scattered infiltrates.

## 2022-11-14 LAB
ANION GAP SERPL CALC-SCNC: 7 MMOL/L — SIGNIFICANT CHANGE UP (ref 5–17)
BUN SERPL-MCNC: 22 MG/DL — SIGNIFICANT CHANGE UP (ref 7–23)
CALCIUM SERPL-MCNC: 8.5 MG/DL — SIGNIFICANT CHANGE UP (ref 8.5–10.1)
CHLORIDE SERPL-SCNC: 109 MMOL/L — HIGH (ref 96–108)
CO2 SERPL-SCNC: 22 MMOL/L — SIGNIFICANT CHANGE UP (ref 22–31)
CREAT SERPL-MCNC: 1.04 MG/DL — SIGNIFICANT CHANGE UP (ref 0.5–1.3)
EGFR: 53 ML/MIN/1.73M2 — LOW
GLUCOSE SERPL-MCNC: 128 MG/DL — HIGH (ref 70–99)
HCT VFR BLD CALC: 36.9 % — SIGNIFICANT CHANGE UP (ref 34.5–45)
HGB BLD-MCNC: 12.4 G/DL — SIGNIFICANT CHANGE UP (ref 11.5–15.5)
INR BLD: 1.2 RATIO — HIGH (ref 0.88–1.16)
MCHC RBC-ENTMCNC: 27.8 PG — SIGNIFICANT CHANGE UP (ref 27–34)
MCHC RBC-ENTMCNC: 33.6 GM/DL — SIGNIFICANT CHANGE UP (ref 32–36)
MCV RBC AUTO: 82.7 FL — SIGNIFICANT CHANGE UP (ref 80–100)
PLATELET # BLD AUTO: 173 K/UL — SIGNIFICANT CHANGE UP (ref 150–400)
POTASSIUM SERPL-MCNC: 3.8 MMOL/L — SIGNIFICANT CHANGE UP (ref 3.5–5.3)
POTASSIUM SERPL-SCNC: 3.8 MMOL/L — SIGNIFICANT CHANGE UP (ref 3.5–5.3)
PROTHROM AB SERPL-ACNC: 13.9 SEC — HIGH (ref 10.5–13.4)
RBC # BLD: 4.46 M/UL — SIGNIFICANT CHANGE UP (ref 3.8–5.2)
RBC # FLD: 14.1 % — SIGNIFICANT CHANGE UP (ref 10.3–14.5)
SODIUM SERPL-SCNC: 138 MMOL/L — SIGNIFICANT CHANGE UP (ref 135–145)
T3 SERPL-MCNC: 58 NG/DL — LOW (ref 80–200)
T4 FREE SERPL-MCNC: 0.81 NG/DL — SIGNIFICANT CHANGE UP (ref 0.76–1.46)
WBC # BLD: 4.76 K/UL — SIGNIFICANT CHANGE UP (ref 3.8–10.5)
WBC # FLD AUTO: 4.76 K/UL — SIGNIFICANT CHANGE UP (ref 3.8–10.5)

## 2022-11-14 PROCEDURE — 99232 SBSQ HOSP IP/OBS MODERATE 35: CPT

## 2022-11-14 RX ADMIN — Medication 6 MILLIGRAM(S): at 11:28

## 2022-11-14 RX ADMIN — Medication 1200 MILLIGRAM(S): at 11:28

## 2022-11-14 RX ADMIN — Medication 1200 MILLIGRAM(S): at 18:20

## 2022-11-14 RX ADMIN — QUETIAPINE FUMARATE 25 MILLIGRAM(S): 200 TABLET, FILM COATED ORAL at 21:40

## 2022-11-14 RX ADMIN — ENOXAPARIN SODIUM 40 MILLIGRAM(S): 100 INJECTION SUBCUTANEOUS at 11:28

## 2022-11-14 RX ADMIN — REMDESIVIR 500 MILLIGRAM(S): 5 INJECTION INTRAVENOUS at 21:40

## 2022-11-14 NOTE — PROGRESS NOTE ADULT - SUBJECTIVE AND OBJECTIVE BOX
CC: covid     HPI: 81 y/o female with no PMHx  presented  to the ED for increased generalized weakness. Pt was diagnosed with COVID 1 week ago at [facility] and came today after experiencing increased generalized weakness since leaving ED. Pt also reports that she is not eating or drinking well as well as a few episodes of NBNB vomiting. No abdominal pain, fever, SOB, CP. Not vaccinated for COVID. Pt is [place] speaking but translation provided by daughter.  Pt hypoxic in to 90%, abnormal CXR, started on Dexa and Remdesivir adm for further Rx.     INTERVAL HPI/ OVERNIGHT EVENTS:  Pt was seen and examined,   more alert, reports feels much better. Cough improving, still with poor oral intake. Plan discussed. Pt wants to go home, explained that still needs O2.     Vital Signs Last 24 Hrs  T(C): 36.8 (2022 09:06), Max: 36.8 (2022 09:06)  T(F): 98.2 (2022 09:06), Max: 98.2 (2022 09:06)  HR: 64 (2022 09:59) (64 - 84)  BP: 110/64 (2022 09:59) (101/42 - 138/79)  RR: 16 (2022 09:59) (16 - 18)  SpO2: 92% (2022 09:59) (91% - 94%)    Parameters below as of 2022 09:59  Patient On (Oxygen Delivery Method): nasal cannula  O2 Flow (L/min): 3.5        REVIEW OF SYSTEMS:  All other review of systems is negative unless indicated above.      PHYSICAL EXAM:  General: Well developed; frail elderly female, agitated   Eyes:  EOMI; conjunctiva and sclera clear  Head: Normocephalic; atraumatic  ENMT: No nasal discharge; airway clear  Neck: Supple; non tender; no masses  Respiratory: Decreased BS,  No rhonchi, No wheezes  Cardiovascular: Regular rate and rhythm. S1 and S2 Normal; + murmur  Gastrointestinal: Soft non-tender non-distended; Normal bowel sounds  Genitourinary: No  suprapubic  tenderness  Extremities: No edema  Vascular: Peripheral pulses palpable 2+ bilaterally  Neurological: Alert and oriented x 2-3,  speech is clear, No facial asymmetry, no pronator drift   Skin: Warm and dry. No acute rash  Musculoskeletal: Normal muscle tone, without deformities          LABS:                         12.4   4.76  )-----------( 173      ( 2022 08:15 )             36.9     11-14    138  |  109<H>  |  22  ----------------------------<  128<H>  3.8   |  22  |  1.04    Ca    8.5      2022 08:15    TPro  6.5  /  Alb  2.8<L>  /  TBili  0.4  /  DBili  0.1  /  AST  32  /  ALT  20  /  AlkPhos  100  11-  LIVER FUNCTIONS - ( 2022 08:15 )  Alb: 2.8 g/dL / Pro: 6.5 gm/dL / ALK PHOS: 100 U/L / ALT: 20 U/L / AST: 32 U/L / GGT: x           PT/INR - ( 2022 08:15 )   PT: 13.9 sec;   INR: 1.20 ratio                                13.3   5.58  )-----------( 191      ( 2022 08:56 )             40.4     11-13    139  |  108  |  25<H>  ----------------------------<  147<H>  3.3<L>   |  20<L>  |  0.92    Ca    9.0      2022 08:56    TPro  7.5  /  Alb  3.3  /  TBili  0.5  /  DBili  0.2  /  AST  33  /  ALT  22  /  AlkPhos  113  11-13        LIVER FUNCTIONS - ( 2022 08:56 )  Alb: 3.3 g/dL / Pro: 7.5 gm/dL / ALK PHOS: 113 U/L / ALT: 22 U/L / AST: 33 U/L / GGT: x           PT/INR - ( 2022 08:56 )   PT: 13.2 sec;   INR: 1.14 ratio                              12.4   3.68  )-----------( 141      ( 2022 08:49 )             36.3     2022 08:49    141    |  112    |  24     ----------------------------<  123    3.6     |  22     |  0.83     Ca    8.6        2022 08:49  Mg     2.0       2022 09:51    TPro  6.4    /  Alb  2.9    /  TBili  0.3    /  DBili  0.1    /  AST  31     /  ALT  22     /  AlkPhos  97     2022 08:49    PT/INR - ( 2022 08:49 )   PT: 11.9 sec;   INR: 1.03 ratio        LIVER FUNCTIONS - ( 2022 08:49 )  Alb: 2.9 g/dL / Pro: 6.4 gm/dL / ALK PHOS: 97 U/L / ALT: 22 U/L / AST: 31 U/L / GGT: x           Urinalysis Basic - ( 2022 12:00 )    Color: Yellow / Appearance: Clear / S.015 / pH: x  Gluc: x / Ketone: Small  / Bili: Negative / Urobili: 4   Blood: x / Protein: Trace mg/dL / Nitrite: Positive   Leuk Esterase: Trace / RBC: 0-2 /HPF / WBC 6-10   Sq Epi: x / Non Sq Epi: Occasional / Bacteria: Many        MEDICATIONS  (STANDING):  dexAMETHasone     Tablet 6 milliGRAM(s) Oral daily  enoxaparin Injectable 40 milliGRAM(s) SubCutaneous every 24 hours  guaiFENesin ER 1200 milliGRAM(s) Oral every 12 hours  QUEtiapine 25 milliGRAM(s) Oral at bedtime  remdesivir  IVPB 100 milliGRAM(s) IV Intermittent every 24 hours  remdesivir  IVPB   IV Intermittent     MEDICATIONS  (PRN):  acetaminophen     Tablet .. 650 milliGRAM(s) Oral every 6 hours PRN Temp greater or equal to 38C (100.4F), Mild Pain (1 - 3)  albuterol    90 MICROgram(s) HFA Inhaler 2 Puff(s) Inhalation every 6 hours PRN Shortness of Breath and/or Wheezing  aluminum hydroxide/magnesium hydroxide/simethicone Suspension 30 milliLiter(s) Oral every 4 hours PRN Dyspepsia  haloperidol    Injectable 1 milliGRAM(s) IntraMuscular every 6 hours PRN agittion  melatonin 3 milliGRAM(s) Oral at bedtime PRN Insomnia  ondansetron Injectable 4 milliGRAM(s) IV Push every 8 hours PRN Nausea and/or Vomiting    RADIOLOGY & ADDITIONAL TESTS:    ACC: [id number] EXAM:  XR CHEST PORTABLE URGENT 1V                          PROCEDURE DATE:  2022          INTERPRETATION:  AP erect chest on 2022 at 9:36 AM. Patient   has Covid.    Heart magnified by technique.    Mild right lower thoracic curve again noted.    Present film shows a few small scattered infiltrates consistent with   Covid. Infiltrates are new since 2015.    IMPRESSION: Small scattered infiltrates.

## 2022-11-14 NOTE — PHYSICAL THERAPY INITIAL EVALUATION ADULT - PERTINENT HX OF CURRENT PROBLEM, REHAB EVAL
83 y/o female with no PMHx  presented  to the ED for increased generalized weakness. Pt was diagnosed with COVID 1 week ago at [facility] and came today after experiencing increased generalized weakness since leaving ED. Pt also reports that she is not eating or drinking well as well as a few episodes of NBNB vomiting. Pt is [place] speaking Pt. s/p Acute hypoxic respiratory failure secondary to COVID19 PNA

## 2022-11-14 NOTE — PROGRESS NOTE ADULT - ASSESSMENT
*Acute hypoxic respiratory failure secondary to COVID19 PNA  Monitor pulse ox, supplement via NC on 3.5l NC, will titrate down   C/w  Dexamethasone and Remdesivir  Day 4  C/w  Mucinex and Albuterol PRN   Check pulse ox on ambulation tomorrow     * Agitation Confusion, likely due to metabolic encephalopathy and delirium  2/2 infection and dehydration   supportive care   Seroquel QHS  Haldol  IM PRN   supportive care     * Nausea and Vomiting, resolved, still poor oral intake   possibly related to gastritis vs GERD  C/w Pantoprazole  Zofran PRN      * Abnormal UA  Not symptomatic  F/u UCX, sent only today   Monitor for now       * Systolic ejection murmur  ECHO: Mild to mod AS, EF 60-65%      * Sinus Bradycardia  Was in 40s in ED  C/w Tele: SR-Sbrady, HR  50-60  Check TSH  elevated, mildly low Total  T3, but T4 wnl  will need to repeat TFts in few weeks when improves       *  DVT prophylaxis with low molecular weight heparin    Dispo;  Evaluate for home O2 tomorrow, start d/c planning

## 2022-11-14 NOTE — PHYSICAL THERAPY INITIAL EVALUATION ADULT - ADDITIONAL COMMENTS
Pt. speaks Dominican with a little English, PT called for  but was on hold for a while with no answer so hung up. Pt. lives with her dtr. swetha w/o AD

## 2022-11-14 NOTE — PHYSICAL THERAPY INITIAL EVALUATION ADULT - DIAGNOSIS, PT EVAL
Bronchoscopy with Endobronchial Biopsy/TBNA Procedure Note      Location: [facility] Endoscopy Suite    Date of Operation: 6/17/2022     Attending Physician Performing Procedure: Yue Swanson M.D.     Pre-op Diagnosis: Metastatic malignancy     Post-op Diagnosis: Metastatic Malignancy    Anesthesia: General, administered by Dr Grant    Operation:   Diagnostic flexible fiberoptic bronchoscopy, with bronchoalveolar lavage  Endobronchial Ultrasound and transbronchial needle aspiration    Specimen: BAL and Fine needle aspiration     Estimated Blood Loss: Minimal    Complications: None    Indications and History:    The patient is a 42 y.o. Female with history of melanoma and concern for metastatic disease. The risks, benefits, complications, treatment options and expected outcomes were discussed with the patient. The possibilities of reaction to medication, pulmonary aspiration, perforation of a viscus, bleeding, failure to diagnose a condition and creating a complication requiring transfusion or operation were discussed with the patient who freely signed the consent.    Description of Procedure:    The patient was seen in the Pre-op area and interval H&P was verified. The patient was taken to the endoscopy suite, identified as Jacque Mcintyre and a Time Out was held and the above information confirmed. After the induction of general anesthesia the patient was positioned supine and the endobronchial ultrasound bronchoscope was passed through the ETT. The scope was then passed into the trachea. Careful inspection of the tracheal lumen was accomplished.     Using the endobronchial ultrasound, a systematic survey of the accessible mediastinal and hilar lymph nodes was performed, notable for lymphadenopathy at stations 12L. Then, under direct ultrasound visualization, transbronchial needle aspirations were performed at the following lymph node stations:    1. SIZE OF NEEDLE   12L    2. STATIONS  SAMPLED  12L    3. ASHA CONFIRMATION  Lymphoid tissue    The endobronchial ultrasound was then withdrawn and the bronchoscope was subsequently passed into the left main and then left upper and lower bronchi and segmental bronchi. BAL was done in LLL and there was cloudy specimen.    The scope was then withdrawn and advanced into the right main bronchus and then into the RUL, RML, and RLL bronchi and segmental bronchi.     Trachea: normal bronchial mucosa  Dana: normal bronchial mucosa  Right main bronchus: normal bronchial mucosa  Right upper lobe bronchus: normal bronchial mucosa  Right middle lobe bronchus: normal bronchial mucosa  Right lower lobe bronchus: normal bronchial mucosa  Left main bronchus: normal bronchial mucosa  Left upper lobe bronchus: normal bronchial mucosa  Left lower lobe bronchus: normal bronchial mucosa    The Patient was subsequently taken to the PACU in satisfactory condition.    Patients mother was notified of the results of the procedure who will be driving patient home after recovery in PACU.      Yue Swanson MD RD  Pulmonary and Critical Care    Available on [facility]       weakness

## 2022-11-14 NOTE — PHYSICAL THERAPY INITIAL EVALUATION ADULT - GENERAL OBSERVATIONS, REHAB EVAL
Pt. received supine in bed + tele + O2 nc in isolation room covid + agreeable to PT, Bed mob with SBA, Amb w/o AD SBA 75 ft in room

## 2022-11-15 LAB
ALBUMIN SERPL ELPH-MCNC: 2.9 G/DL — LOW (ref 3.3–5)
ALP SERPL-CCNC: 109 U/L — SIGNIFICANT CHANGE UP (ref 40–120)
ALT FLD-CCNC: 22 U/L — SIGNIFICANT CHANGE UP (ref 12–78)
AST SERPL-CCNC: 26 U/L — SIGNIFICANT CHANGE UP (ref 15–37)
BILIRUB DIRECT SERPL-MCNC: 0.1 MG/DL — SIGNIFICANT CHANGE UP (ref 0–0.3)
BILIRUB INDIRECT FLD-MCNC: 0.4 MG/DL — SIGNIFICANT CHANGE UP (ref 0.2–1)
BILIRUB SERPL-MCNC: 0.5 MG/DL — SIGNIFICANT CHANGE UP (ref 0.2–1.2)
CREAT SERPL-MCNC: 0.84 MG/DL — SIGNIFICANT CHANGE UP (ref 0.5–1.3)
D DIMER BLD IA.RAPID-MCNC: 504 NG/ML DDU — HIGH
EGFR: 69 ML/MIN/1.73M2 — SIGNIFICANT CHANGE UP
INR BLD: 1.19 RATIO — HIGH (ref 0.88–1.16)
PROT SERPL-MCNC: 6.8 GM/DL — SIGNIFICANT CHANGE UP (ref 6–8.3)
PROTHROM AB SERPL-ACNC: 13.8 SEC — HIGH (ref 10.5–13.4)

## 2022-11-15 PROCEDURE — 99233 SBSQ HOSP IP/OBS HIGH 50: CPT

## 2022-11-15 RX ORDER — REMDESIVIR 5 MG/ML
100 INJECTION INTRAVENOUS EVERY 24 HOURS
Refills: 0 | Status: COMPLETED | OUTPATIENT
Start: 2022-11-16 | End: 2022-11-20

## 2022-11-15 RX ORDER — CEFTRIAXONE 500 MG/1
1000 INJECTION, POWDER, FOR SOLUTION INTRAMUSCULAR; INTRAVENOUS EVERY 24 HOURS
Refills: 0 | Status: COMPLETED | OUTPATIENT
Start: 2022-11-16 | End: 2022-11-18

## 2022-11-15 RX ORDER — CEFTRIAXONE 500 MG/1
1000 INJECTION, POWDER, FOR SOLUTION INTRAMUSCULAR; INTRAVENOUS ONCE
Refills: 0 | Status: COMPLETED | OUTPATIENT
Start: 2022-11-15 | End: 2022-11-15

## 2022-11-15 RX ORDER — CEFTRIAXONE 500 MG/1
INJECTION, POWDER, FOR SOLUTION INTRAMUSCULAR; INTRAVENOUS
Refills: 0 | Status: COMPLETED | OUTPATIENT
Start: 2022-11-15 | End: 2022-11-19

## 2022-11-15 RX ORDER — DEXAMETHASONE 0.5 MG/5ML
6 ELIXIR ORAL DAILY
Refills: 0 | Status: DISCONTINUED | OUTPATIENT
Start: 2022-11-15 | End: 2022-11-21

## 2022-11-15 RX ADMIN — Medication 6 MILLIGRAM(S): at 10:40

## 2022-11-15 RX ADMIN — ENOXAPARIN SODIUM 40 MILLIGRAM(S): 100 INJECTION SUBCUTANEOUS at 10:41

## 2022-11-15 RX ADMIN — Medication 3 MILLIGRAM(S): at 21:49

## 2022-11-15 RX ADMIN — Medication 1200 MILLIGRAM(S): at 10:40

## 2022-11-15 RX ADMIN — CEFTRIAXONE 1000 MILLIGRAM(S): 500 INJECTION, POWDER, FOR SOLUTION INTRAMUSCULAR; INTRAVENOUS at 21:49

## 2022-11-15 RX ADMIN — REMDESIVIR 500 MILLIGRAM(S): 5 INJECTION INTRAVENOUS at 21:49

## 2022-11-15 RX ADMIN — QUETIAPINE FUMARATE 25 MILLIGRAM(S): 200 TABLET, FILM COATED ORAL at 21:49

## 2022-11-15 RX ADMIN — Medication 1200 MILLIGRAM(S): at 16:56

## 2022-11-15 NOTE — PROGRESS NOTE ADULT - ASSESSMENT
*Acute hypoxic respiratory failure secondary to COVID19 PNA  Monitor pulse ox, supplement via NC on 3.5l NC, will titrate down   C/w  Dexamethasone and Remdesivir   Day 5, will extend   C/w  Mucinex and Albuterol PRN   O2 checked on ambulation desatting to 88 % on 4L  Trend D-Dimers  Pulm eval       * Agitation Confusion, likely due to metabolic encephalopathy and delirium  2/2 infection and dehydration   supportive care   Seroquel QHS  Haldol  IM PRN   supportive care     * Nausea and Vomiting, resolved, still poor oral intake   possibly related to gastritis vs GERD  C/w Pantoprazole  Zofran PRN      * Abnormal UA, suspect ECOLI UTI  UCX:  + >100K   start ceftriaxone   F/u final Cx       * Systolic ejection murmur  ECHO: Mild to mod AS, EF 60-65%      * Sinus Bradycardia  Was in 40s in ED  C/w Tele: SR-Sbrady, HR  50-60  TSH  elevated, mildly low Total  T3, but T4 wnl  will need to repeat TFts in few weeks when improves       *  DVT prophylaxis with low molecular weight heparin    Dispo; c/w Remdesivir and Dexa, Repeat CXR , Pulm eval

## 2022-11-15 NOTE — PROGRESS NOTE ADULT - SUBJECTIVE AND OBJECTIVE BOX
CC: covid     HPI: 83 y/o female with no PMHx  presented  to the ED for increased generalized weakness. Pt was diagnosed with COVID 1 week ago at  and came today after experiencing increased generalized weakness since leaving ED. Pt also reports that she is not eating or drinking well as well as a few episodes of NBNB vomiting. No abdominal pain, fever, SOB, CP. Not vaccinated for COVID. Pt is [place] speaking but translation provided by daughter.  Pt hypoxic in to 90%, abnormal CXR, started on Dexa and Remdesivir adm for further Rx.     INTERVAL HPI/ OVERNIGHT EVENTS:  Pt was seen and examined,  reports that feels well, denies  SOB, as per RN still poor  oral intake. Pt is mildly confused, fixated on idea that daughter told her that Pt will need to stay in her house after d/c, Pt lives alone in [place]. Explained Pt that daughter is caring and its unsafe  for her to be by herself.       Vital Signs Last 24 Hrs  T(C): 36.4 (15 Nov 2022 17:21), Max: 36.9 (15 Nov 2022 00:07)  T(F): 97.5 (15 Nov 2022 17:21), Max: 98.4 (15 Nov 2022 00:07)  HR: 61 (15 Nov 2022 17:21) (56 - 68)  BP: 133/68 (15 Nov 2022 17:21) (112/66 - 133/68)  RR: 19 (15 Nov 2022 17:21) (19 - 19)  SpO2: 94% (15 Nov 2022 17:21) (94% - 96%)    Parameters below as of 15 Nov 2022 17:21  Patient On (Oxygen Delivery Method): nasal cannula  O2 Flow (L/min): 3          REVIEW OF SYSTEMS:  All other review of systems is negative unless indicated above.      PHYSICAL EXAM:  General: Well developed; frail elderly female, agitated   Eyes:  EOMI; conjunctiva and sclera clear  Head: Normocephalic; atraumatic  ENMT: No nasal discharge; airway clear  Neck: Supple; non tender; no masses  Respiratory: Decreased BS,  No rhonchi, No wheezes  Cardiovascular: Regular rate and rhythm. S1 and S2 Normal; + murmur  Gastrointestinal: Soft non-tender non-distended; Normal bowel sounds  Genitourinary: No  suprapubic  tenderness  Extremities: No edema  Vascular: Peripheral pulses palpable 2+ bilaterally  Neurological: Alert and oriented x 2, mildly confused,   speech is clear, No facial asymmetry, no pronator drift   Skin: Warm and dry. No acute rash  Musculoskeletal: Normal muscle tone, without deformities          LABS:                         12.4   4.76  )-----------( 173      ( 2022 08:15 )             36.9     11-15    x   |  x   |  x   ----------------------------<  x   x    |  x   |  0.84    Ca    8.5      2022 08:15    TPro  6.8  /  Alb  2.9<L>  /  TBili  0.5  /  DBili  0.1  /  AST  26  /  ALT  22  /  AlkPhos  109  1115    LIVER FUNCTIONS - ( 15 Nov 2022 07:26 )  Alb: 2.9 g/dL / Pro: 6.8 gm/dL / ALK PHOS: 109 U/L / ALT: 22 U/L / AST: 26 U/L / GGT: x         PT/INR - ( 15 Nov 2022 07:26 )   PT: 13.8 sec;   INR: 1.19 ratio                              12.4   4.76  )-----------( 173      ( 2022 08:15 )             36.9     11-14    138  |  109<H>  |  22  ----------------------------<  128<H>  3.8   |  22  |  1.04    Ca    8.5      2022 08:15    TPro  6.5  /  Alb  2.8<L>  /  TBili  0.4  /  DBili  0.1  /  AST  32  /  ALT  20  /  AlkPhos  100  11-14  LIVER FUNCTIONS - ( 2022 08:15 )  Alb: 2.8 g/dL / Pro: 6.5 gm/dL / ALK PHOS: 100 U/L / ALT: 20 U/L / AST: 32 U/L / GGT: x           PT/INR - ( 2022 08:15 )   PT: 13.9 sec;   INR: 1.20 ratio                                13.3   5.58  )-----------( 191      ( 2022 08:56 )             40.4     13    139  |  108  |  25<H>  ----------------------------<  147<H>  3.3<L>   |  20<L>  |  0.92    Ca    9.0      2022 08:56    TPro  7.5  /  Alb  3.3  /  TBili  0.5  /  DBili  0.2  /  AST  33  /  ALT  22  /  AlkPhos  113  13        LIVER FUNCTIONS - ( 2022 08:56 )  Alb: 3.3 g/dL / Pro: 7.5 gm/dL / ALK PHOS: 113 U/L / ALT: 22 U/L / AST: 33 U/L / GGT: x           PT/INR - ( 2022 08:56 )   PT: 13.2 sec;   INR: 1.14 ratio                              12.4   3.68  )-----------( 141      ( 2022 08:49 )             36.3     2022 08:49    141    |  112    |  24     ----------------------------<  123    3.6     |  22     |  0.83     Ca    8.6        2022 08:49  Mg     2.0       2022 09:51    TPro  6.4    /  Alb  2.9    /  TBili  0.3    /  DBili  0.1    /  AST  31     /  ALT  22     /  AlkPhos  97     2022 08:49    PT/INR - ( 2022 08:49 )   PT: 11.9 sec;   INR: 1.03 ratio        LIVER FUNCTIONS - ( 2022 08:49 )  Alb: 2.9 g/dL / Pro: 6.4 gm/dL / ALK PHOS: 97 U/L / ALT: 22 U/L / AST: 31 U/L / GGT: x           Urinalysis Basic - ( 2022 12:00 )    Color: Yellow / Appearance: Clear / S.015 / pH: x  Gluc: x / Ketone: Small  / Bili: Negative / Urobili: 4   Blood: x / Protein: Trace mg/dL / Nitrite: Positive   Leuk Esterase: Trace / RBC: 0-2 /HPF / WBC 6-10   Sq Epi: x / Non Sq Epi: Occasional / Bacteria: Many    Culture - Urine in AM (22 @ 13:20)   Specimen Source: Clean Catch Clean Catch (Midstream)   Culture Results:   >100,000 CFU/ml Escherichia coli     MEDICATIONS  (STANDING):  cefTRIAXone Injectable.      cefTRIAXone Injectable. 1000 milliGRAM(s) IV Push once  dexAMETHasone     Tablet 6 milliGRAM(s) Oral daily  enoxaparin Injectable 40 milliGRAM(s) SubCutaneous every 24 hours  guaiFENesin ER 1200 milliGRAM(s) Oral every 12 hours  QUEtiapine 25 milliGRAM(s) Oral at bedtime  remdesivir  IVPB 100 milliGRAM(s) IV Intermittent every 24 hours  remdesivir  IVPB   IV Intermittent     MEDICATIONS  (PRN):  acetaminophen     Tablet .. 650 milliGRAM(s) Oral every 6 hours PRN Temp greater or equal to 38C (100.4F), Mild Pain (1 - 3)  albuterol    90 MICROgram(s) HFA Inhaler 2 Puff(s) Inhalation every 6 hours PRN Shortness of Breath and/or Wheezing  aluminum hydroxide/magnesium hydroxide/simethicone Suspension 30 milliLiter(s) Oral every 4 hours PRN Dyspepsia  haloperidol    Injectable 1 milliGRAM(s) IntraMuscular every 6 hours PRN agittion  melatonin 3 milliGRAM(s) Oral at bedtime PRN Insomnia  ondansetron Injectable 4 milliGRAM(s) IV Push every 8 hours PRN Nausea and/or Vomiting    RADIOLOGY & ADDITIONAL TESTS:    ACC: [id number] EXAM:  XR CHEST PORTABLE URGENT 1V                          PROCEDURE DATE:  2022          INTERPRETATION:  AP erect chest on 2022 at 9:36 AM. Patient   has Covid.    Heart magnified by technique.    Mild right lower thoracic curve again noted.    Present film shows a few small scattered infiltrates consistent with   Covid. Infiltrates are new since 2015.    IMPRESSION: Small scattered infiltrates.

## 2022-11-16 LAB
-  AMIKACIN: SIGNIFICANT CHANGE UP
-  AMOXICILLIN/CLAVULANIC ACID: SIGNIFICANT CHANGE UP
-  AMPICILLIN/SULBACTAM: SIGNIFICANT CHANGE UP
-  AMPICILLIN: SIGNIFICANT CHANGE UP
-  AZTREONAM: SIGNIFICANT CHANGE UP
-  CEFAZOLIN: SIGNIFICANT CHANGE UP
-  CEFEPIME: SIGNIFICANT CHANGE UP
-  CEFOXITIN: SIGNIFICANT CHANGE UP
-  CEFTRIAXONE: SIGNIFICANT CHANGE UP
-  CIPROFLOXACIN: SIGNIFICANT CHANGE UP
-  ERTAPENEM: SIGNIFICANT CHANGE UP
-  GENTAMICIN: SIGNIFICANT CHANGE UP
-  IMIPENEM: SIGNIFICANT CHANGE UP
-  LEVOFLOXACIN: SIGNIFICANT CHANGE UP
-  MEROPENEM: SIGNIFICANT CHANGE UP
-  NITROFURANTOIN: SIGNIFICANT CHANGE UP
-  PIPERACILLIN/TAZOBACTAM: SIGNIFICANT CHANGE UP
-  TOBRAMYCIN: SIGNIFICANT CHANGE UP
-  TRIMETHOPRIM/SULFAMETHOXAZOLE: SIGNIFICANT CHANGE UP
ANION GAP SERPL CALC-SCNC: 8 MMOL/L — SIGNIFICANT CHANGE UP (ref 5–17)
BUN SERPL-MCNC: 24 MG/DL — HIGH (ref 7–23)
CALCIUM SERPL-MCNC: 9 MG/DL — SIGNIFICANT CHANGE UP (ref 8.5–10.1)
CHLORIDE SERPL-SCNC: 111 MMOL/L — HIGH (ref 96–108)
CO2 SERPL-SCNC: 22 MMOL/L — SIGNIFICANT CHANGE UP (ref 22–31)
CREAT SERPL-MCNC: 0.8 MG/DL — SIGNIFICANT CHANGE UP (ref 0.5–1.3)
CULTURE RESULTS: SIGNIFICANT CHANGE UP
EGFR: 73 ML/MIN/1.73M2 — SIGNIFICANT CHANGE UP
GLUCOSE SERPL-MCNC: 153 MG/DL — HIGH (ref 70–99)
HCT VFR BLD CALC: 39.3 % — SIGNIFICANT CHANGE UP (ref 34.5–45)
HGB BLD-MCNC: 13.3 G/DL — SIGNIFICANT CHANGE UP (ref 11.5–15.5)
INR BLD: 1.2 RATIO — HIGH (ref 0.88–1.16)
MCHC RBC-ENTMCNC: 28.1 PG — SIGNIFICANT CHANGE UP (ref 27–34)
MCHC RBC-ENTMCNC: 33.8 GM/DL — SIGNIFICANT CHANGE UP (ref 32–36)
MCV RBC AUTO: 82.9 FL — SIGNIFICANT CHANGE UP (ref 80–100)
METHOD TYPE: SIGNIFICANT CHANGE UP
ORGANISM # SPEC MICROSCOPIC CNT: SIGNIFICANT CHANGE UP
ORGANISM # SPEC MICROSCOPIC CNT: SIGNIFICANT CHANGE UP
PLATELET # BLD AUTO: 223 K/UL — SIGNIFICANT CHANGE UP (ref 150–400)
POTASSIUM SERPL-MCNC: 3.6 MMOL/L — SIGNIFICANT CHANGE UP (ref 3.5–5.3)
POTASSIUM SERPL-SCNC: 3.6 MMOL/L — SIGNIFICANT CHANGE UP (ref 3.5–5.3)
PROTHROM AB SERPL-ACNC: 14 SEC — HIGH (ref 10.5–13.4)
RBC # BLD: 4.74 M/UL — SIGNIFICANT CHANGE UP (ref 3.8–5.2)
RBC # FLD: 13.9 % — SIGNIFICANT CHANGE UP (ref 10.3–14.5)
SODIUM SERPL-SCNC: 141 MMOL/L — SIGNIFICANT CHANGE UP (ref 135–145)
SPECIMEN SOURCE: SIGNIFICANT CHANGE UP
WBC # BLD: 8.32 K/UL — SIGNIFICANT CHANGE UP (ref 3.8–10.5)
WBC # FLD AUTO: 8.32 K/UL — SIGNIFICANT CHANGE UP (ref 3.8–10.5)

## 2022-11-16 PROCEDURE — 99233 SBSQ HOSP IP/OBS HIGH 50: CPT

## 2022-11-16 RX ORDER — ENOXAPARIN SODIUM 100 MG/ML
50 INJECTION SUBCUTANEOUS EVERY 12 HOURS
Refills: 0 | Status: DISCONTINUED | OUTPATIENT
Start: 2022-11-16 | End: 2022-11-24

## 2022-11-16 RX ADMIN — REMDESIVIR 500 MILLIGRAM(S): 5 INJECTION INTRAVENOUS at 23:17

## 2022-11-16 RX ADMIN — ENOXAPARIN SODIUM 40 MILLIGRAM(S): 100 INJECTION SUBCUTANEOUS at 10:11

## 2022-11-16 RX ADMIN — Medication 6 MILLIGRAM(S): at 10:24

## 2022-11-16 RX ADMIN — CEFTRIAXONE 1000 MILLIGRAM(S): 500 INJECTION, POWDER, FOR SOLUTION INTRAMUSCULAR; INTRAVENOUS at 18:05

## 2022-11-16 RX ADMIN — QUETIAPINE FUMARATE 25 MILLIGRAM(S): 200 TABLET, FILM COATED ORAL at 23:18

## 2022-11-16 RX ADMIN — Medication 1200 MILLIGRAM(S): at 17:32

## 2022-11-16 NOTE — PROGRESS NOTE ADULT - ASSESSMENT
*Acute hypoxic respiratory failure secondary to COVID19 PNA  - patient with episodes of Desat - especially exertional. Patient with trending up D-dimer, team is trying to rule out PE -  patient refused CTA. For now will treat as if patient has a PE -  lovernox. Care discussed wiht Dr. Childs  Monitor pulse ox, supplement via NC on 3.5l NC, will titrate down   C/w  Dexamethasone and Remdesivir   Day 5, will extend   C/w  Mucinex and Albuterol PRN   O2 checked on ambulation desatting to 88 % on 4L  Trend D-Dimers  Pulm eval       * Agitation Confusion, likely due to metabolic encephalopathy and delirium  2/2 infection and dehydration   supportive care   Seroquel QHS  Haldol  IM PRN   supportive care     * Nausea and Vomiting, resolved, still poor oral intake   possibly related to gastritis vs GERD  C/w Pantoprazole  Zofran PRN      # uti E-COLI   start ceftriaxone   F/u final Cx     #Systolic ejection murmur  ECHO: Mild to mod AS, EF 60-65%    # Sinus Bradycardia  Was in 40s in ED  C/w Tele: SR-Sbrady, HR  50-60  TSH  elevated, mildly low Total  T3, but T4 wnl  will need to repeat TFts in few weeks when improves     # DVT prophylaxis with low molecular weight heparin

## 2022-11-16 NOTE — PROGRESS NOTE ADULT - SUBJECTIVE AND OBJECTIVE BOX
CC: covid     HPI: 81 y/o female with no PMHx  presented  to the ED for increased generalized weakness. Pt was diagnosed with COVID 1 week ago at [facility] and came today after experiencing increased generalized weakness since leaving ED. Pt also reports that she is not eating or drinking well as well as a few episodes of NBNB vomiting. No abdominal pain, fever, SOB, CP. Not vaccinated for COVID. Pt is [place] speaking but translation provided by daughter.  Pt hypoxic in to 90%, abnormal CXR, started on Dexa and Remdesivir adm for further Rx.     INTERVAL HPI/ OVERNIGHT EVENTS:  Pt was seen and examined,  reports that feels well, denies  SOB, as per RN still poor  oral intake. Pt is mildly confused, fixated on idea that daughter told her that Pt will need to stay in her house after d/c, Pt lives alone in [place]. Explained Pt that daughter is caring and its unsafe  for her to be by herself.     REVIEW OF SYSTEMS:  All other review of systems is negative unless indicated above.    PHYSICAL EXAM:  ICU Vital Signs Last 24 Hrs  T(C): 36.5 (16 Nov 2022 08:10), Max: 36.6 (15 Nov 2022 21:13)  T(F): 97.7 (16 Nov 2022 08:10), Max: 97.8 (15 Nov 2022 21:13)  HR: 64 (16 Nov 2022 08:54) (59 - 64)  BP: 120/74 (16 Nov 2022 08:10) (120/74 - 164/78)  BP(mean): --  ABP: --  ABP(mean): --  RR: 19 (16 Nov 2022 08:10) (19 - 19)  SpO2: 93% (16 Nov 2022 08:54) (93% - 94%)      PE:   General: Well developed; frail elderly female, agitated   Eyes:  EOMI; conjunctiva and sclera clear  Head: Normocephalic; atraumatic  ENMT: No nasal discharge; airway clear  Neck: Supple; non tender; no masses  Respiratory: Decreased BS,  No rhonchi, No wheezes  Cardiovascular: Regular rate and rhythm. S1 and S2 Normal; + murmur  Gastrointestinal: Soft non-tender non-distended; Normal bowel sounds  Genitourinary: No  suprapubic  tenderness  Extremities: No edema  Vascular: Peripheral pulses palpable 2+ bilaterally  Neurological: Alert and oriented x 2, mildly confused,   speech is clear, No facial asymmetry, no pronator drift   Skin: Warm and dry. No acute rash  Musculoskeletal: Normal muscle tone, without deformities          LABS:     CBC Full  -  ( 16 Nov 2022 07:22 )  WBC Count : 8.32 K/uL  RBC Count : 4.74 M/uL  Hemoglobin : 13.3 g/dL  Hematocrit : 39.3 %  Platelet Count - Automated : 223 K/uL  Mean Cell Volume : 82.9 fl  Mean Cell Hemoglobin : 28.1 pg  Mean Cell Hemoglobin Concentration : 33.8 gm/dL  Auto Neutrophil # : x  Auto Lymphocyte # : x  Auto Monocyte # : x  Auto Eosinophil # : x  Auto Basophil # : x  Auto Neutrophil % : x  Auto Lymphocyte % : x  Auto Monocyte % : x  Auto Eosinophil % : x  Auto Basophil % : x    PT/INR - ( 16 Nov 2022 07:22 )   PT: 14.0 sec;   INR: 1.20 ratio             11-16    141  |  111<H>  |  24<H>  ----------------------------<  153<H>  3.6   |  22  |  0.80    Ca    9.0      16 Nov 2022 07:22    TPro  7.1  /  Alb  2.9<L>  /  TBili  0.5  /  DBili  0.1  /  AST  25  /  ALT  23  /  AlkPhos  116  11-16

## 2022-11-16 NOTE — CONSULT NOTE ADULT - ASSESSMENT
1) Hypoxemia  2) COVID Pneumonia  3) Dyspnea  4) Elevated Ddimer    81 y/o female with no PMHx  presents to the ED for increased generalized weakness. Pt was diagnosed with COVID  ago at [facility] and came today after experiencing increased generalized weakness since leaving ED. Pt also reports that she is not eating or drinking well as well as a few episodes of NBNB vomiting.   No abdominal pain, fever, SOB, CP. Not vaccinated for COVID. Pt is Guyanese speaking but translation provided by daughter.  Pt hypoxic in to 90%, abnormal CXR, started on Dexa and Remdesivir   Ddimer noted to be 504, age appropriate >800   Ambulatory desaturations to 78% were noted on 11/15  Vaccination status is unclear but patient states that she was not vaccinated   Currently on Remdesivir and Decadron  Will order CTPE   Reviewed Echocardiogram  Will discuss with  with hospitalist

## 2022-11-16 NOTE — CONSULT NOTE ADULT - SUBJECTIVE AND OBJECTIVE BOX
Patient is a 82y old  Female who presents with a chief complaint of covid (15 Nov 2022 11:12)      HPI:  83 y/o female with no PMHx  presents to the ED for increased generalized weakness. Pt was diagnosed with COVID  ago at [facility] and came today after experiencing increased generalized weakness since leaving ED. Pt also reports that she is not eating or drinking well as well as a few episodes of NBNB vomiting. No abdominal pain, fever, SOB, CP. Not vaccinated for COVID. Pt is Swazi speaking but translation provided by daughter.  Pt hypoxic in to 90%, abnormal CXR, started on Dexa and Remdesivir adm for further Rx. (11 Nov 2022 16:14)  Ddimer noted to be 504  ambulatory desaturations to 78% were noted on 11/15    PAST MEDICAL & SURGICAL HISTORY:  No pertinent past medical history      No significant past surgical history          PREVIOUS DIAGNOSTIC TESTING:      MEDICATIONS  (STANDING):  cefTRIAXone Injectable.      cefTRIAXone Injectable. 1000 milliGRAM(s) IV Push every 24 hours  dexAMETHasone  Injectable 6 milliGRAM(s) IV Push daily  enoxaparin Injectable 40 milliGRAM(s) SubCutaneous every 24 hours  guaiFENesin ER 1200 milliGRAM(s) Oral every 12 hours  QUEtiapine 25 milliGRAM(s) Oral at bedtime  remdesivir  IVPB 100 milliGRAM(s) IV Intermittent every 24 hours    MEDICATIONS  (PRN):  acetaminophen     Tablet .. 650 milliGRAM(s) Oral every 6 hours PRN Temp greater or equal to 38C (100.4F), Mild Pain (1 - 3)  albuterol    90 MICROgram(s) HFA Inhaler 2 Puff(s) Inhalation every 6 hours PRN Shortness of Breath and/or Wheezing  aluminum hydroxide/magnesium hydroxide/simethicone Suspension 30 milliLiter(s) Oral every 4 hours PRN Dyspepsia  haloperidol    Injectable 1 milliGRAM(s) IntraMuscular every 6 hours PRN agittion  melatonin 3 milliGRAM(s) Oral at bedtime PRN Insomnia  ondansetron Injectable 4 milliGRAM(s) IV Push every 8 hours PRN Nausea and/or Vomiting      FAMILY HISTORY:  No pertinent family history in first degree relatives        SOCIAL HISTORY:  ***    REVIEW OF SYSTEM:    Vital Signs Last 24 Hrs  T(C): 36.6 (15 Nov 2022 21:13), Max: 36.6 (15 Nov 2022 21:13)  T(F): 97.8 (15 Nov 2022 21:13), Max: 97.8 (15 Nov 2022 21:13)  HR: 59 (15 Nov 2022 21:13) (59 - 68)  BP: 164/78 (15 Nov 2022 21:13) (112/66 - 164/78)  BP(mean): --  RR: 19 (15 Nov 2022 21:13) (19 - 19)  SpO2: 93% (15 Nov 2022 21:13) (93% - 96%)    Parameters below as of 15 Nov 2022 21:13  Patient On (Oxygen Delivery Method): nasal cannula  O2 Flow (L/min): 3      I&O's Summary    PHYSICAL EXAM  General Appearance: cooperative, no acute distress,   HEENT: PERRL, conjunctiva clear, EOM's intact, non injected pharynx, no exudate, TM   normal  Neck: Supple, , no adenopathy, thyroid: not enlarged, no carotid bruit or JVD  Back: Symmetric, no  tenderness,no soft tissue tenderness  Lungs:   Heart: Regular rate and rhythm, S1, S2 normal, no murmur, rub or gallop  Abdomen: Soft, non-tender, bowel sounds active , no hepatosplenomegaly  Extremities: no cyanosis or edema, no joint swelling  Skin: Skin color, texture normal, no rashes   Neurologic: Alert and oriented X3 , cranial nerves intact, sensory and motor normal,    ECG:    LABS:                          12.4   4.76  )-----------( 173      ( 14 Nov 2022 08:15 )             36.9     11-15    x   |  x   |  x   ----------------------------<  x   x    |  x   |  0.84    Ca    8.5      14 Nov 2022 08:15    TPro  6.8  /  Alb  2.9<L>  /  TBili  0.5  /  DBili  0.1  /  AST  26  /  ALT  22  /  AlkPhos  109  11-15          Pro BNP  -- 11-15 @ 22:06  D Dimer  504 11-15 @ 22:06  Pro BNP  -- 11-13 @ 08:56  D Dimer  336 11-13 @ 08:56    PT/INR - ( 15 Nov 2022 07:26 )   PT: 13.8 sec;   INR: 1.19 ratio                   RADIOLOGY & ADDITIONAL STUDIES:     Patient is a 82y old  Female who presents with a chief complaint of covid (15 Nov 2022 11:12)      HPI:  81 y/o female with no PMHx  presents to the ED for increased generalized weakness. Pt was diagnosed with COVID  ago at [facility] and came today after experiencing increased generalized weakness since leaving ED. Pt also reports that she is not eating or drinking well as well as a few episodes of NBNB vomiting. No abdominal pain, fever, SOB, CP. Not vaccinated for COVID. Pt is [place] speaking but translation provided by daughter.  Pt hypoxic in to 90%, abnormal CXR, started on Dexa and Remdesivir adm for further Rx. (11 Nov 2022 16:14)  Ddimer noted to be 504  Ambulatory desaturations to 78% were noted on 11/15  Vaccination status is unclear     PAST MEDICAL & SURGICAL HISTORY:  No pertinent past medical history      No significant past surgical history          PREVIOUS DIAGNOSTIC TESTING:      MEDICATIONS  (STANDING):  cefTRIAXone Injectable.      cefTRIAXone Injectable. 1000 milliGRAM(s) IV Push every 24 hours  dexAMETHasone  Injectable 6 milliGRAM(s) IV Push daily  enoxaparin Injectable 40 milliGRAM(s) SubCutaneous every 24 hours  guaiFENesin ER 1200 milliGRAM(s) Oral every 12 hours  QUEtiapine 25 milliGRAM(s) Oral at bedtime  remdesivir  IVPB 100 milliGRAM(s) IV Intermittent every 24 hours    MEDICATIONS  (PRN):  acetaminophen     Tablet .. 650 milliGRAM(s) Oral every 6 hours PRN Temp greater or equal to 38C (100.4F), Mild Pain (1 - 3)  albuterol    90 MICROgram(s) HFA Inhaler 2 Puff(s) Inhalation every 6 hours PRN Shortness of Breath and/or Wheezing  aluminum hydroxide/magnesium hydroxide/simethicone Suspension 30 milliLiter(s) Oral every 4 hours PRN Dyspepsia  haloperidol    Injectable 1 milliGRAM(s) IntraMuscular every 6 hours PRN agittion  melatonin 3 milliGRAM(s) Oral at bedtime PRN Insomnia  ondansetron Injectable 4 milliGRAM(s) IV Push every 8 hours PRN Nausea and/or Vomiting      FAMILY HISTORY:  No pertinent family history in first degree relatives        SOCIAL HISTORY:  ***    REVIEW OF SYSTEM:    Vital Signs Last 24 Hrs  T(C): 36.5 (16 Nov 2022 08:10), Max: 36.6 (15 Nov 2022 21:13)  T(F): 97.7 (16 Nov 2022 08:10), Max: 97.8 (15 Nov 2022 21:13)  HR: 64 (16 Nov 2022 08:10) (59 - 64)  BP: 120/74 (16 Nov 2022 08:10) (120/74 - 164/78)  BP(mean): --  RR: 19 (16 Nov 2022 08:10) (19 - 19)  SpO2: 94% (16 Nov 2022 08:10) (93% - 94%)    Parameters below as of 16 Nov 2022 08:10  Patient On (Oxygen Delivery Method): nasal cannula  O2 Flow (L/min): 3        I&O's Summary    PHYSICAL EXAM  General Appearance: cooperative, no acute distress,   HEENT: PERRL, conjunctiva clear, EOM's intact, non injected pharynx, no exudate, TM   normal  Neck: Supple, , no adenopathy, thyroid: not enlarged, no carotid bruit or JVD  Back: Symmetric, no  tenderness,no soft tissue tenderness  Lungs: diminished mikala  Heart: Regular rate and rhythm, S1, S2 normal, no murmur, rub or gallop  Abdomen: Soft, non-tender, bowel sounds active , no hepatosplenomegaly  Extremities: no cyanosis or edema, no joint swelling  Skin: Skin color, texture normal, no rashes   Neurologic: Alert and oriented X3 , cranial nerves intact, sensory and motor normal,    ECG:    LABS:                          12.4   4.76  )-----------( 173      ( 14 Nov 2022 08:15 )             36.9     11-15    x   |  x   |  x   ----------------------------<  x   x    |  x   |  0.84    Ca    8.5      14 Nov 2022 08:15    TPro  6.8  /  Alb  2.9<L>  /  TBili  0.5  /  DBili  0.1  /  AST  26  /  ALT  22  /  AlkPhos  109  11-15          Pro BNP  -- 11-15 @ 22:06  D Dimer  504 11-15 @ 22:06  Pro BNP  -- 11-13 @ 08:56  D Dimer  336 11-13 @ 08:56    PT/INR - ( 15 Nov 2022 07:26 )   PT: 13.8 sec;   INR: 1.19 ratio                   RADIOLOGY & ADDITIONAL STUDIES:

## 2022-11-17 LAB
ALBUMIN SERPL ELPH-MCNC: 2.8 G/DL — LOW (ref 3.3–5)
ALP SERPL-CCNC: 117 U/L — SIGNIFICANT CHANGE UP (ref 40–120)
ALT FLD-CCNC: 24 U/L — SIGNIFICANT CHANGE UP (ref 12–78)
AST SERPL-CCNC: 19 U/L — SIGNIFICANT CHANGE UP (ref 15–37)
BASE EXCESS BLDA CALC-SCNC: -5.1 MMOL/L — LOW (ref -2–3)
BILIRUB DIRECT SERPL-MCNC: 0.1 MG/DL — SIGNIFICANT CHANGE UP (ref 0–0.3)
BILIRUB INDIRECT FLD-MCNC: 0.4 MG/DL — SIGNIFICANT CHANGE UP (ref 0.2–1)
BILIRUB SERPL-MCNC: 0.5 MG/DL — SIGNIFICANT CHANGE UP (ref 0.2–1.2)
BLOOD GAS COMMENTS ARTERIAL: SIGNIFICANT CHANGE UP
CO2 BLDA-SCNC: 17 MMOL/L — LOW (ref 19–24)
CREAT SERPL-MCNC: 0.8 MG/DL — SIGNIFICANT CHANGE UP (ref 0.5–1.3)
D DIMER BLD IA.RAPID-MCNC: 1157 NG/ML DDU — HIGH
EGFR: 73 ML/MIN/1.73M2 — SIGNIFICANT CHANGE UP
GAS PNL BLDA: SIGNIFICANT CHANGE UP
HCO3 BLDA-SCNC: 16 MMOL/L — LOW (ref 21–28)
HOROWITZ INDEX BLDA+IHG-RTO: SIGNIFICANT CHANGE UP
INR BLD: 1.24 RATIO — HIGH (ref 0.88–1.16)
PCO2 BLDA: 21 MMHG — LOW (ref 32–35)
PH BLDA: 7.49 — HIGH (ref 7.35–7.45)
PO2 BLDA: 62 MMHG — LOW (ref 83–108)
PROT SERPL-MCNC: 6.9 GM/DL — SIGNIFICANT CHANGE UP (ref 6–8.3)
PROTHROM AB SERPL-ACNC: 14.4 SEC — HIGH (ref 10.5–13.4)
SAO2 % BLDA: 94 % — SIGNIFICANT CHANGE UP

## 2022-11-17 PROCEDURE — 70450 CT HEAD/BRAIN W/O DYE: CPT | Mod: 26

## 2022-11-17 PROCEDURE — 93970 EXTREMITY STUDY: CPT | Mod: 26

## 2022-11-17 PROCEDURE — 99291 CRITICAL CARE FIRST HOUR: CPT

## 2022-11-17 PROCEDURE — 99223 1ST HOSP IP/OBS HIGH 75: CPT

## 2022-11-17 PROCEDURE — 71275 CT ANGIOGRAPHY CHEST: CPT | Mod: 26

## 2022-11-17 RX ADMIN — REMDESIVIR 500 MILLIGRAM(S): 5 INJECTION INTRAVENOUS at 21:38

## 2022-11-17 RX ADMIN — Medication 1200 MILLIGRAM(S): at 21:38

## 2022-11-17 RX ADMIN — ENOXAPARIN SODIUM 50 MILLIGRAM(S): 100 INJECTION SUBCUTANEOUS at 01:20

## 2022-11-17 RX ADMIN — ENOXAPARIN SODIUM 50 MILLIGRAM(S): 100 INJECTION SUBCUTANEOUS at 21:37

## 2022-11-17 RX ADMIN — CEFTRIAXONE 1000 MILLIGRAM(S): 500 INJECTION, POWDER, FOR SOLUTION INTRAMUSCULAR; INTRAVENOUS at 21:37

## 2022-11-17 RX ADMIN — QUETIAPINE FUMARATE 25 MILLIGRAM(S): 200 TABLET, FILM COATED ORAL at 21:38

## 2022-11-17 RX ADMIN — ENOXAPARIN SODIUM 50 MILLIGRAM(S): 100 INJECTION SUBCUTANEOUS at 10:26

## 2022-11-17 RX ADMIN — Medication 6 MILLIGRAM(S): at 10:26

## 2022-11-17 NOTE — PROGRESS NOTE ADULT - SUBJECTIVE AND OBJECTIVE BOX
CC: covid     HPI: 83 y/o female with no PMHx  presented  to the ED for increased generalized weakness. Pt was diagnosed with COVID 1 week ago at [facility] and came today after experiencing increased generalized weakness since leaving ED. Pt also reports that she is not eating or drinking well as well as a few episodes of NBNB vomiting. No abdominal pain, fever, SOB, CP. Not vaccinated for COVID. Pt is Sudanese speaking but translation provided by daughter.  Pt hypoxic in to 90%, abnormal CXR, started on Dexa and Remdesivir adm for further Rx.       Medical progress: Deconditioned and frail. Patient this am sleepy. Patient agreeable for CTA this am  Complaints: tired and sleepy  State of mind: normal  - continue with isolation    REVIEW OF SYSTEMS:  All other review of systems is negative unless indicated above.    PHYSICAL EXAM:  ICU Vital Signs Last 24 Hrs  T(C): 36.7 (17 Nov 2022 07:42), Max: 36.7 (16 Nov 2022 16:30)  T(F): 98.1 (17 Nov 2022 07:42), Max: 98.1 (17 Nov 2022 07:42)  HR: 59 (17 Nov 2022 07:42) (50 - 64)  BP: 144/64 (17 Nov 2022 07:42) (124/62 - 166/71)  RR: 17 (17 Nov 2022 07:42) (17 - 18)  SpO2: 88% (17 Nov 2022 07:42) (83% - 97%)  General: Well developed; frail elderly female, agitated   Eyes:  EOMI; conjunctiva and sclera clear  Head: Normocephalic; atraumatic  ENMT: No nasal discharge; airway clear  Neck: Supple; non tender; no masses  Respiratory: Decreased BS,  No rhonchi, No wheezes  Cardiovascular: Regular rate and rhythm. S1 and S2 Normal; + murmur  Gastrointestinal: Soft non-tender non-distended; Normal bowel sounds  Genitourinary: No  suprapubic  tenderness  Extremities: No edema  Vascular: Peripheral pulses palpable 2+ bilaterally  Neurological: Alert and oriented x 2, mildly confused,   speech is clear, No facial asymmetry, no pronator drift   Skin: Warm and dry. No acute rash  Musculoskeletal: Normal muscle tone, without deformities    LABS:       CBC Full  -  ( 16 Nov 2022 07:22 )  WBC Count : 8.32 K/uL  RBC Count : 4.74 M/uL  Hemoglobin : 13.3 g/dL  Hematocrit : 39.3 %  Platelet Count - Automated : 223 K/uL  Mean Cell Volume : 82.9 fl  Mean Cell Hemoglobin : 28.1 pg  Mean Cell Hemoglobin Concentration : 33.8 gm/dL  Auto Neutrophil # : x  Auto Lymphocyte # : x  Auto Monocyte # : x  Auto Eosinophil # : x  Auto Basophil # : x  Auto Neutrophil % : x  Auto Lymphocyte % : x  Auto Monocyte % : x  Auto Eosinophil % : x  Auto Basophil % : x    PT/INR - ( 17 Nov 2022 07:28 )   PT: 14.4 sec;   INR: 1.24 ratio             11-17    x   |  x   |  x   ----------------------------<  x   x    |  x   |  0.80    Ca    9.0      16 Nov 2022 07:22    TPro  6.9  /  Alb  2.8<L>  /  TBili  0.5  /  DBili  0.1  /  AST  19  /  ALT  24  /  AlkPhos  117  11-17       CC: covid     HPI: 83 y/o female with no PMHx  presented  to the ED for increased generalized weakness. Pt was diagnosed with COVID 1 week ago at [facility] and came today after experiencing increased generalized weakness since leaving ED. Pt also reports that she is not eating or drinking well as well as a few episodes of NBNB vomiting. No abdominal pain, fever, SOB, CP. Not vaccinated for COVID. Pt is Sudanese speaking but translation provided by daughter.  Pt hypoxic in to 90%, abnormal CXR, started on Dexa and Remdesivir adm for further Rx.       Medical progress: Deconditioned and frail. Patient this am sleepy. Patient agreeable for CTA this am  Complaints: tired and sleepy  State of mind: normal  - continue with isolation    Addendum: Patient this am weak and sleepy which subsequently progressed to lethargy -> non-responsive. RRT called by me. Patient after a verbal / painful stimulus more awake but lethargic. Able tell me that she is in the hospital, her name and . With stable labs. Stable vitals. Able to move upper and lower extremities without any facial droop. Patient is going to have STAT CT head and chest. pensding ABG. Patient's daughter has been updated     REVIEW OF SYSTEMS:  All other review of systems is negative unless indicated above.    PHYSICAL EXAM:  T(C): 36.7 (2022 07:42), Max: 36.7 (2022 16:30)  T(F): 98.1 (2022 07:42), Max: 98.1 (2022 07:42)  HR: 59 (2022 07:42) (50 - 64)  BP: 144/64 (2022 07:42) (124/62 - 166/71)  RR: 17 (2022 07:42) (17 - 18)  SpO2: 88% (2022 07:42) (83% - 97%)  General: deconditioend, frail, sleepy /  lethargic  Eyes:  EOMI; conjunctiva and sclera clear  Head: Normocephalic; atraumatic  ENMT: No nasal discharge; airway clear  Neck: Supple; non tender; no masses  Respiratory: Decreased BS,  No rhonchi, No wheezes  Cardiovascular: Regular rate and rhythm. S1 and S2 Normal; + murmur  Gastrointestinal: Soft non-tender non-distended; Normal bowel sounds  Genitourinary: No  suprapubic  tenderness  Extremities: No edema  Vascular: Peripheral pulses palpable 2+ bilaterally  Neurological: Alert and oriented x 2, mildly confused,   speech is clear, No facial asymmetry, no pronator drift   Skin: Warm and dry. No acute rash  Musculoskeletal: Normal muscle tone, without deformities    LABS:       CBC Full  -  ( 2022 07:22 )  WBC Count : 8.32 K/uL  RBC Count : 4.74 M/uL  Hemoglobin : 13.3 g/dL  Hematocrit : 39.3 %  Platelet Count - Automated : 223 K/uL  Mean Cell Volume : 82.9 fl  Mean Cell Hemoglobin : 28.1 pg  Mean Cell Hemoglobin Concentration : 33.8 gm/dL  Auto Neutrophil # : x  Auto Lymphocyte # : x  Auto Monocyte # : x  Auto Eosinophil # : x  Auto Basophil # : x  Auto Neutrophil % : x  Auto Lymphocyte % : x  Auto Monocyte % : x  Auto Eosinophil % : x  Auto Basophil % : x    PT/INR - ( 2022 07:28 )   PT: 14.4 sec;   INR: 1.24 ratio             -    x   |  x   |  x   ----------------------------<  x   x    |  x   |  0.80    Ca    9.0      2022 07:22    TPro  6.9  /  Alb  2.8<L>  /  TBili  0.5  /  DBili  0.1  /  AST  19  /  ALT  24  /  AlkPhos  117  -

## 2022-11-17 NOTE — CONSULT NOTE ADULT - ASSESSMENT
81 y/o female recently diagnosed with COVID-19 viral syndrome was admitted on 11/11 for increased generalized weakness, SOB and cough. Pt was diagnosed with COVID 1 week PTA at [facility] and came today after experiencing increased generalized weakness since leaving ED. Pt also reports poor appetite. Not vaccinated for COVID. In ER, the patient was noted hypoxic in to 90%, abnormal CXR, started on Dexa and Remdesivir. Her remdesivir therapy was extended on 11/16 due to persistent SOB and hypoxia.     1. COVID-19 viral syndrome. Acute respiratory failure. Multifocal pneumonia. UTI with E.coli. Encephalopathy.  -respiratory frail  -has extensive pulmonary infiltrates  -obtain inflammatory markers  -on remdesivir protocol # 7 and ceftriaxone 1 gm IV qd # 2  -O2 therapy  -steroids  -AC  -droplet isolation  -respiratory care  -old chart reviewed to assess prior cultures  -monitor temps  -f/u CBC  -supportive care  2. Other issues:   -care per medicine       83 y/o female recently diagnosed with COVID-19 viral syndrome was admitted on 11/11 for increased generalized weakness, SOB and cough. Pt was diagnosed with COVID 1 week PTA at [facility] and came today after experiencing increased generalized weakness since leaving ED. Pt also reports poor appetite. Not vaccinated for COVID. In ER, the patient was noted hypoxic in to 90%, abnormal CXR, started on Dexa and Remdesivir. Her remdesivir therapy was extended on 11/16 due to persistent SOB and hypoxia.     1. COVID-19 viral syndrome. Acute respiratory failure. Multifocal pneumonia. UTI with E.coli. Encephalopathy.  -respiratory frail  -has extensive pulmonary infiltrates  -obtain inflammatory markers  -on remdesivir protocol # 6 and ceftriaxone 1 gm IV qd # 2  -O2 therapy  -steroids  -AC  -droplet isolation  -respiratory care  -old chart reviewed to assess prior cultures  -monitor temps  -f/u CBC  -supportive care  2. Other issues:   -care per medicine

## 2022-11-17 NOTE — PROGRESS NOTE ADULT - ASSESSMENT
*Acute hypoxic respiratory failure secondary to COVID19 PNA  - patient with episodes of Desat - especially exertional. Patient with trending up D-dimer, team is trying to rule out PE -  patient refused CTA. For now will treat as if patient has a PE -  lovernox. Care discussed wiht Dr. Childs  - Monitor pulse ox, supplement via NC on 3.5l NC, will titrate down   - C/w  Dexamethasone and Remdesivir   Day 5, will extend   - C/w  Mucinex and Albuterol PRN   - O2 checked on ambulation desatting to 88 % on 4L  - Trend D-Dimers  - Pulm eval     * Agitation Confusion, likely due to metabolic encephalopathy and delirium  2/2 infection and dehydration   - supportive care   - Seroquel QHS  - Haldol  IM PRN   - supportive care     * Nausea and Vomiting, resolved, still poor oral intake   - possibly related to gastritis vs GERD  - C/w Pantoprazole  - Zofran PRN    # UTI E-COLI   - start ceftriaxone  day # 2    #Systolic ejection murmur  - ECHO: Mild to mod AS, EF 60-65%    # Sinus Bradycardia  Was in 40s in ED  C/w Tele: SR-Sbrady, HR  50-60  TSH  elevated, mildly low Total  T3, but T4 wnl  will need to repeat TFts in few weeks when improves     # DVT prophylaxis with low molecular weight heparin     * Acute hypoxic respiratory failure secondary to COVID19 PNA  - patient with episodes of Desat - especially exertional. Patient with trending up D-dimer, team is trying to rule out PE -  patient refused CTA. For now will treat as if patient has a PE -  Lovenox Care discussed wiht Dr. Childs  - Monitor pulse ox, supplement via NC on 3.5l NC, will titrate down   - C/w  Dexamethasone and Remdesivir   Day 5, will extend   - C/w  Mucinex and Albuterol PRN   - O2 checked on ambulation desatting to 88 % on 4L  - Trend D-Dimers  - Pulm eval     * Metabolic encephalopathy / more lethargic this am  - STAT CT of the head  - Seroquel QHS  - Haldol  IM PRN   - supportive care     * Nausea and Vomiting, resolved, still poor oral intake   - possibly related to gastritis vs GERD  - C/w Pantoprazole  - Zofran PRN    # UTI E-COLI   - start ceftriaxone  day # 2    #Systolic ejection murmur  - ECHO: Mild to mod AS, EF 60-65%    # Sinus Bradycardia  - Was in 40s in ED  - C/w Tele: SR-Sbrady, HR  50-60  - TSH  elevated, mildly low Total  T3, but T4 wnl  - will need to repeat TFts in few weeks when improves     # DVT prophylaxis

## 2022-11-17 NOTE — CONSULT NOTE ADULT - ASSESSMENT
83 y/o woman  presented  to the ED, + COVID PNA, UTI on abx.  today became lethargic -> non-responsive. RRT called, CT head showed no acute changes. Exam non focal. Likely metabolic encephalopathy.    Suggest:  continue present care as per medicine  Once medically stable, PT maribelal

## 2022-11-17 NOTE — CONSULT NOTE ADULT - SUBJECTIVE AND OBJECTIVE BOX
Neurology Consult requested by:   Patient is a 82y old  Female who presents with a chief complaint of covid (17 Nov 2022 08:50)     HPI:  83 y/o female with no PMHx  presents to the ED for increased generalized weakness. Pt was diagnosed with COVID PNA.  Pt hypoxic in to 90%, abnormal CXR, started on Dexa and Remdesivir. Today episode of unresponsiveness.       PAST MEDICAL & SURGICAL HISTORY:  No pertinent past medical history      No significant past surgical history        FAMILY HISTORY:  No pertinent family history in first degree relatives      Social Hx:  Nonsmoker, no drug or alcohol use  Medications and Allergies ReviewedMEDICATIONS  (STANDING):  cefTRIAXone Injectable.      cefTRIAXone Injectable. 1000 milliGRAM(s) IV Push every 24 hours  dexAMETHasone  Injectable 6 milliGRAM(s) IV Push daily  enoxaparin Injectable 50 milliGRAM(s) SubCutaneous every 12 hours  guaiFENesin ER 1200 milliGRAM(s) Oral every 12 hours  QUEtiapine 25 milliGRAM(s) Oral at bedtime  remdesivir  IVPB 100 milliGRAM(s) IV Intermittent every 24 hours     ROS: Pertinent positives in HPI, all other ROS were reviewed and are negative.      Examination:   Vital Signs Last 24 Hrs  T(C): 36.7 (17 Nov 2022 07:42), Max: 36.7 (16 Nov 2022 16:30)  T(F): 98.1 (17 Nov 2022 07:42), Max: 98.1 (17 Nov 2022 07:42)  HR: 59 (17 Nov 2022 07:42) (50 - 59)  BP: 144/64 (17 Nov 2022 07:42) (124/62 - 166/71)    RR: 17 (17 Nov 2022 07:42) (17 - 18)  SpO2: 88% (17 Nov 2022 07:42) (83% - 97%)    Parameters below as of 17 Nov 2022 07:42  Patient On (Oxygen Delivery Method): nasal cannula  O2 Flow (L/min): 4    General: Cooperative, NAD   NECK: supple, no masses  ENT: Normal hearing   Vascular : no carotid bruits,   Lungs: CTAB  Chest: RRR, no murmurs  Extremities: nontender, no edema  Musculoskeletal: no adventitious movements, no joint stiffness  Skin: no rash    Neurological Examination:  NIHSS:  MS: AOx3. Appropriately interactive, normal affect. Speech fluent w/o paraphasic error, repetition, naming, reading is intact   CN: No Papilledema, PERLL, EOMI, V1-3 sensation intact, face symmetric, hearing intact, palate elevates symmetrically, tongue midline, SCM equal bilaterally  Motor: normal bulk and tone, no tremor, rigidity or bradykinesia.  5/5 all over   Sens: Intact to light touch.    Reflexes: 2/4 all over, downgoing toes b/l  Coord:  No dysmetria, EMILIA intact   Gait: Cannot test    Labs: Reviewed  Basic Metabolic Panel in AM (11.16.22 @ 07:22)   Sodium, Serum: 141 mmol/L   Potassium, Serum: 3.6 mmol/L   Chloride, Serum: 111 mmol/L   Carbon Dioxide, Serum: 22 mmol/L   Anion Gap, Serum: 8 mmol/L   Blood Urea Nitrogen, Serum: 24 mg/dL   Creatinine, Serum: 0.80 mg/dL   Glucose, Serum: 153 mg/dL   Calcium, Total Serum: 9.0 mg/dL   eGFR: 73:   Complete Blood Count in AM (11.16.22 @ 07:22)   WBC Count: 8.32 K/uL   RBC Count: 4.74 M/uL   Hemoglobin: 13.3 g/dL   Hematocrit: 39.3 %   Mean Cell Volume: 82.9 fl   Mean Cell Hemoglobin: 28.1 pg   Mean Cell Hemoglobin Conc: 33.8 gm/dL   Red Cell Distrib Width: 13.9 %   Platelet Count - Automated: 223 K/uL     Hepatic Function Panel (11.16.22 @ 07:22)   Indirect Reacting Bilirubin: 0.4 mg/dL   Protein Total, Serum: 7.1 gm/dL   Albumin, Serum: 2.9 g/dL   Bilirubin Total, Serum: 0.5 mg/dL   Bilirubin Direct, Serum: 0.1 mg/dL   Alkaline Phosphatase, Serum: 116 U/L   Aspartate Aminotransferase (AST/SGOT): 25 U/L   Alanine Aminotransferase (ALT/SGPT): 23 U/L     < from: CT Head No Cont (11.17.22 @ 11:02) >  PROCEDURE DATE:  11/17/2022          INTERPRETATION:  Clinical indications: Altered mental status.    Multiple axial sections were performed from the base of vertex without   contrast enhancement. Coronal and sagittal reconstructions were performed.    Parenchymal volume loss and chronic microvascular ischemic changes are   identified    There is no acute hemorrhage mass or mass effect seen.    Evaluation of the osseous structures with the appropriate window   demonstrate nonspecific lucency involving the right parietal calvarium.   Continued close interval follow-up can be done to ensure stability.    The visualized paranasal sinuses mastoid and middle ear regions appear   clear.    IMPRESSION: No evidence of acute hemorrhage mass or mass effect    Nonspecific lucency involving the right parietal calvarium as described   above.    < end of copied text >     Neurology Consult requested by:   Patient is a 82y old  Female who presents with a chief complaint of covid (17 Nov 2022 08:50)     HPI:  81 y/o female with no PMHx  presents to the ED, diagnosed with COVID PNA, c/o generalized.  Spoke with daughter, patient usually independent. Lives in [place], does own shopping. Her daughter has noticed some forgetfulness,  but usually  independent. Pt hypoxic. Started on Dexa and Remdesivir. On ceftriaxone for UTI. Today episode of unresponsiveness, improved .       PAST MEDICAL & SURGICAL HISTORY:  No pertinent past medical history      No significant past surgical history        FAMILY HISTORY:  No pertinent family history in first degree relatives      Social Hx:  Nonsmoker, no drug or alcohol use  Medications and Allergies ReviewedMEDICATIONS  (STANDING):  cefTRIAXone Injectable.      cefTRIAXone Injectable. 1000 milliGRAM(s) IV Push every 24 hours  dexAMETHasone  Injectable 6 milliGRAM(s) IV Push daily  enoxaparin Injectable 50 milliGRAM(s) SubCutaneous every 12 hours  guaiFENesin ER 1200 milliGRAM(s) Oral every 12 hours  QUEtiapine 25 milliGRAM(s) Oral at bedtime  remdesivir  IVPB 100 milliGRAM(s) IV Intermittent every 24 hours     ROS: Pertinent positives in HPI, all other ROS were reviewed and are negative.      Examination:   Vital Signs Last 24 Hrs  T(C): 36.7 (17 Nov 2022 07:42), Max: 36.7 (16 Nov 2022 16:30)  T(F): 98.1 (17 Nov 2022 07:42), Max: 98.1 (17 Nov 2022 07:42)  HR: 59 (17 Nov 2022 07:42) (50 - 59)  BP: 144/64 (17 Nov 2022 07:42) (124/62 - 166/71)    RR: 17 (17 Nov 2022 07:42) (17 - 18)  SpO2: 88% (17 Nov 2022 07:42) (83% - 97%)    Parameters below as of 17 Nov 2022 07:42  Patient On (Oxygen Delivery Method): nasal cannula  O2 Flow (L/min): 4    General: Cooperative, NAD   NECK: supple, no masses  ENT: Normal hearing   Vascular : no carotid bruits,   Lungs: CTAB  Chest: RRR, no murmurs  Extremities: nontender, no edema  Musculoskeletal: no adventitious movements, no joint stiffness  Skin: no rash    Neurological Examination:  NIHSS:0  MS: Alert, primarily Iraqi, conversant, fluent, follows commands   CN:  PERLL, EOMI, V1-3 sensation intact, face symmetric, hearing intact, palate elevates symmetrically, tongue midline, SCM equal bilaterally  Motor: normal bulk and tone, no tremor, rigidity or bradykinesia.  strength 4/5 all over   Sens: Intact to light touch.    Reflexes: 0-1/4 all over, downgoing toes b/l  Coord:  No dysmetria   Gait: Cannot test    Labs: Reviewed  Basic Metabolic Panel in AM (11.16.22 @ 07:22)   Sodium, Serum: 141 mmol/L   Potassium, Serum: 3.6 mmol/L   Chloride, Serum: 111 mmol/L   Carbon Dioxide, Serum: 22 mmol/L   Anion Gap, Serum: 8 mmol/L   Blood Urea Nitrogen, Serum: 24 mg/dL   Creatinine, Serum: 0.80 mg/dL   Glucose, Serum: 153 mg/dL   Calcium, Total Serum: 9.0 mg/dL   eGFR: 73:   Complete Blood Count in AM (11.16.22 @ 07:22)   WBC Count: 8.32 K/uL   RBC Count: 4.74 M/uL   Hemoglobin: 13.3 g/dL   Hematocrit: 39.3 %   Mean Cell Volume: 82.9 fl   Mean Cell Hemoglobin: 28.1 pg   Mean Cell Hemoglobin Conc: 33.8 gm/dL   Red Cell Distrib Width: 13.9 %   Platelet Count - Automated: 223 K/uL     Hepatic Function Panel (11.16.22 @ 07:22)   Indirect Reacting Bilirubin: 0.4 mg/dL   Protein Total, Serum: 7.1 gm/dL   Albumin, Serum: 2.9 g/dL   Bilirubin Total, Serum: 0.5 mg/dL   Bilirubin Direct, Serum: 0.1 mg/dL   Alkaline Phosphatase, Serum: 116 U/L   Aspartate Aminotransferase (AST/SGOT): 25 U/L   Alanine Aminotransferase (ALT/SGPT): 23 U/L     < from: CT Head No Cont (11.17.22 @ 11:02) >  PROCEDURE DATE:  11/17/2022          INTERPRETATION:  Clinical indications: Altered mental status.    Multiple axial sections were performed from the base of vertex without   contrast enhancement. Coronal and sagittal reconstructions were performed.    Parenchymal volume loss and chronic microvascular ischemic changes are   identified    There is no acute hemorrhage mass or mass effect seen.    Evaluation of the osseous structures with the appropriate window   demonstrate nonspecific lucency involving the right parietal calvarium.   Continued close interval follow-up can be done to ensure stability.    The visualized paranasal sinuses mastoid and middle ear regions appear   clear.    IMPRESSION: No evidence of acute hemorrhage mass or mass effect    Nonspecific lucency involving the right parietal calvarium as described   above.    < end of copied text >

## 2022-11-18 LAB
ALBUMIN SERPL ELPH-MCNC: 2.7 G/DL — LOW (ref 3.3–5)
ALP SERPL-CCNC: 111 U/L — SIGNIFICANT CHANGE UP (ref 40–120)
ALT FLD-CCNC: 20 U/L — SIGNIFICANT CHANGE UP (ref 12–78)
AST SERPL-CCNC: 15 U/L — SIGNIFICANT CHANGE UP (ref 15–37)
BILIRUB DIRECT SERPL-MCNC: 0.1 MG/DL — SIGNIFICANT CHANGE UP (ref 0–0.3)
BILIRUB INDIRECT FLD-MCNC: 0.5 MG/DL — SIGNIFICANT CHANGE UP (ref 0.2–1)
BILIRUB SERPL-MCNC: 0.6 MG/DL — SIGNIFICANT CHANGE UP (ref 0.2–1.2)
CREAT SERPL-MCNC: 0.84 MG/DL — SIGNIFICANT CHANGE UP (ref 0.5–1.3)
CRP SERPL-MCNC: 81 MG/L — HIGH
EGFR: 69 ML/MIN/1.73M2 — SIGNIFICANT CHANGE UP
ERYTHROCYTE [SEDIMENTATION RATE] IN BLOOD: 10 MM/HR — SIGNIFICANT CHANGE UP (ref 0–20)
FERRITIN SERPL-MCNC: 233 NG/ML — HIGH (ref 15–150)
INR BLD: 1.25 RATIO — HIGH (ref 0.88–1.16)
PROT SERPL-MCNC: 6.5 GM/DL — SIGNIFICANT CHANGE UP (ref 6–8.3)
PROTHROM AB SERPL-ACNC: 14.5 SEC — HIGH (ref 10.5–13.4)

## 2022-11-18 PROCEDURE — 99239 HOSP IP/OBS DSCHRG MGMT >30: CPT

## 2022-11-18 RX ADMIN — Medication 3 MILLIGRAM(S): at 22:18

## 2022-11-18 RX ADMIN — CEFTRIAXONE 1000 MILLIGRAM(S): 500 INJECTION, POWDER, FOR SOLUTION INTRAMUSCULAR; INTRAVENOUS at 17:25

## 2022-11-18 RX ADMIN — Medication 1200 MILLIGRAM(S): at 15:18

## 2022-11-18 RX ADMIN — REMDESIVIR 500 MILLIGRAM(S): 5 INJECTION INTRAVENOUS at 22:18

## 2022-11-18 RX ADMIN — Medication 6 MILLIGRAM(S): at 10:34

## 2022-11-18 RX ADMIN — ENOXAPARIN SODIUM 50 MILLIGRAM(S): 100 INJECTION SUBCUTANEOUS at 10:34

## 2022-11-18 RX ADMIN — ENOXAPARIN SODIUM 50 MILLIGRAM(S): 100 INJECTION SUBCUTANEOUS at 22:17

## 2022-11-18 RX ADMIN — Medication 1200 MILLIGRAM(S): at 22:17

## 2022-11-18 RX ADMIN — QUETIAPINE FUMARATE 25 MILLIGRAM(S): 200 TABLET, FILM COATED ORAL at 22:18

## 2022-11-18 NOTE — PROGRESS NOTE ADULT - SUBJECTIVE AND OBJECTIVE BOX
Date of service: 11-18-22 @ 09:58    Sitting in bed in NAD  More alert today  Has dry cough  SOB with light exercise    ROS: no fever or chills; no HA, no abdominal pain, no diarrhea or constipation; no legs pain, no rashes    MEDICATIONS  (STANDING):  cefTRIAXone Injectable.      cefTRIAXone Injectable. 1000 milliGRAM(s) IV Push every 24 hours  dexAMETHasone  Injectable 6 milliGRAM(s) IV Push daily  enoxaparin Injectable 50 milliGRAM(s) SubCutaneous every 12 hours  guaiFENesin ER 1200 milliGRAM(s) Oral every 12 hours  QUEtiapine 25 milliGRAM(s) Oral at bedtime  remdesivir  IVPB 100 milliGRAM(s) IV Intermittent every 24 hours    Vital Signs Last 24 Hrs  T(C): 36.5 (18 Nov 2022 09:11), Max: 36.7 (17 Nov 2022 23:47)  T(F): 97.7 (18 Nov 2022 09:11), Max: 98 (17 Nov 2022 23:47)  HR: 66 (18 Nov 2022 09:11) (58 - 66)  BP: 126/55 (18 Nov 2022 09:11) (126/55 - 161/63)  BP(mean): --  RR: 18 (18 Nov 2022 09:11) (17 - 18)  SpO2: 94% (18 Nov 2022 09:11) (90% - 94%)    Parameters below as of 18 Nov 2022 09:11  Patient On (Oxygen Delivery Method): nasal cannula  O2 Flow (L/min): 4       Physical exam:    Constitutional:  No acute distress  HEENT: NC/AT, EOMI, PERRLA, conjunctivae clear; ears and nose atraumatic  Neck: supple; thyroid not palpable  Back: no tenderness  Respiratory: respiratory effort normal; crackles b/l  Cardiovascular: S1S2 regular, no murmurs  Abdomen: soft, not tender, not distended, positive BS  Genitourinary: no suprapubic tenderness  Lymphatic: no LN palpable  Musculoskeletal: no muscle tenderness, no joint swelling or tenderness  Extremities: no pedal edema  Neurological/ Psychiatric: Alert; moving all extremities  Skin: no rashes; no palpable lesions    Labs: all available labs reviewed                        13.3   8.32  )-----------( 223      ( 16 Nov 2022 07:22 )             39.3     11-17    x   |  x   |  x   ----------------------------<  x   x    |  x   |  0.80    Ca    9.0      16 Nov 2022 07:22    TPro  6.9  /  Alb  2.8<L>  /  TBili  0.5  /  DBili  0.1  /  AST  19  /  ALT  24  /  AlkPhos  117  11-17     LIVER FUNCTIONS - ( 17 Nov 2022 07:28 )  Alb: 2.8 g/dL / Pro: 6.9 gm/dL / ALK PHOS: 117 U/L / ALT: 24 U/L / AST: 19 U/L / GGT: x           Culture - Urine (collected 14 Nov 2022 13:20)  Source: Clean Catch Clean Catch (Midstream)  Final Report (16 Nov 2022 18:46):    >100,000 CFU/ml Escherichia coli  Organism: Escherichia coli (16 Nov 2022 18:46)  Organism: Escherichia coli (16 Nov 2022 18:46)      -  Amikacin: S <=16      -  Amoxicillin/Clavulanic Acid: S <=8/4      -  Ampicillin: S <=8 These ampicillin results predict results for amoxicillin      -  Ampicillin/Sulbactam: S <=4/2 Enterobacter, Klebsiella aerogenes, Citrobacter, and Serratia may develop resistance during prolonged therapy (3-4 days)      -  Aztreonam: S <=4      -  Cefazolin: S <=2 For uncomplicated UTI with K. pneumoniae, E. coli, or P. mirablis: MIGUEL <=16 is sensitive and MIGUEL >=32 is resistant. This also predicts results for oral agents cefaclor, cefdinir, cefpodoxime, cefprozil, cefuroxime axetil, cephalexin and locarbef for uncomplicated UTI. Note that some isolates may be susceptible to these agents while testing resistant to cefazolin.      -  Cefepime: S <=2      -  Cefoxitin: S <=8      -  Ceftriaxone: S <=1 Enterobacter, Klebsiella aerogenes, Citrobacter, and Serratia may develop resistance during prolonged therapy      -  Ciprofloxacin: S <=0.25      -  Ertapenem: S <=0.5      -  Gentamicin: S <=2      -  Imipenem: S <=1      -  Levofloxacin: S <=0.5      -  Meropenem: S <=1      -  Nitrofurantoin: S <=32 Should not be used to treat pyelonephritis      -  Piperacillin/Tazobactam: S <=8      -  Tobramycin: S <=2      -  Trimethoprim/Sulfamethoxazole: S <=0.5/9.5      Method Type: MIGUEL    Radiology: all available radiological tests reviewed    < from: CT Angio Chest PE Protocol w/ IV Cont (11.17.22 @ 11:12) >  IMPRESSION: Limited pulmonary embolism study for evaluation of some of   the subsegmental or smaller pulmonary arterial branches due to   respiratory motion artifact. No pulmonary arterial emboli within the well visualized pulmonary arteries.    Widespread bilateral nodular and patchy opacities as described above   representing pneumonia. Considerations includeCovid 19 given the distribution.    1-3 month follow-up noncontrast chest CT is recommended to ensure resolution.    1.1 cm indeterminate partially imaged left renal lesion. Renal ultrasound can be performed for further evaluation.  < end of copied text >      Advanced directives addressed: full resuscitation

## 2022-11-18 NOTE — PROGRESS NOTE ADULT - SUBJECTIVE AND OBJECTIVE BOX
CC: covid     HPI: 81 y/o female with no PMHx  presented  to the ED for increased generalized weakness. Pt was diagnosed with COVID 1 week ago at [facility] and came today after experiencing increased generalized weakness since leaving ED. Pt also reports that she is not eating or drinking well as well as a few episodes of NBNB vomiting. No abdominal pain, fever, SOB, CP. Not vaccinated for COVID. Pt is Nauruan speaking but translation provided by daughter.  Pt hypoxic in to 90%, abnormal CXR, started on Dexa and Remdesivir adm for further Rx.       Medical progress: Patient is much more awake, much more alert this am. Denies any HA, CP, SOB. States she is just weak. eating well. Given extensity of covid pneumonia patient is at risk of pulmonary deterioration.   Complaints: tired and sleepy  State of mind: normal      REVIEW OF SYSTEMS:  All other review of systems is negative unless indicated above.    PHYSICAL EXAM:  ICU Vital Signs Last 24 Hrs  T(C): 36.7 (17 Nov 2022 23:47), Max: 36.7 (17 Nov 2022 23:47)  T(F): 98 (17 Nov 2022 23:47), Max: 98 (17 Nov 2022 23:47)  HR: 58 (17 Nov 2022 23:47) (58 - 66)  BP: 161/63 (17 Nov 2022 23:47) (141/71 - 161/63)  BP(mean): --  ABP: --  ABP(mean): --  RR: 17 (17 Nov 2022 23:47) (17 - 17)  SpO2: 93% (17 Nov 2022 23:47) (90% - 93%)    O2 Parameters below as of 17 Nov 2022 23:47  Patient On (Oxygen Delivery Method): nasal cannula          General: deconditioend, frail, sleepy /  lethargic  Eyes:  EOMI; conjunctiva and sclera clear  Head: Normocephalic; atraumatic  ENMT: No nasal discharge; airway clear  Neck: Supple; non tender; no masses  Respiratory: Decreased BS,  No rhonchi, No wheezes  Cardiovascular: Regular rate and rhythm. S1 and S2 Normal; + murmur  Gastrointestinal: Soft non-tender non-distended; Normal bowel sounds  Genitourinary: No  suprapubic  tenderness  Extremities: No edema  Vascular: Peripheral pulses palpable 2+ bilaterally  Neurological: Alert and oriented x 2, mildly confused,   speech is clear, No facial asymmetry, no pronator drift   Skin: Warm and dry. No acute rash  Musculoskeletal: Normal muscle tone, without deformities    LABS:       CBC Full  -  ( 16 Nov 2022 07:22 )  WBC Count : 8.32 K/uL  RBC Count : 4.74 M/uL  Hemoglobin : 13.3 g/dL  Hematocrit : 39.3 %  Platelet Count - Automated : 223 K/uL  Mean Cell Volume : 82.9 fl  Mean Cell Hemoglobin : 28.1 pg  Mean Cell Hemoglobin Concentration : 33.8 gm/dL  Auto Neutrophil # : x  Auto Lymphocyte # : x  Auto Monocyte # : x  Auto Eosinophil # : x  Auto Basophil # : x  Auto Neutrophil % : x  Auto Lymphocyte % : x  Auto Monocyte % : x  Auto Eosinophil % : x  Auto Basophil % : x    PT/INR - ( 17 Nov 2022 07:28 )   PT: 14.4 sec;   INR: 1.24 ratio             11-17    x   |  x   |  x   ----------------------------<  x   x    |  x   |  0.80    Ca    9.0      16 Nov 2022 07:22    TPro  6.9  /  Alb  2.8<L>  /  TBili  0.5  /  DBili  0.1  /  AST  19  /  ALT  24  /  AlkPhos  117  11-17

## 2022-11-18 NOTE — PROGRESS NOTE ADULT - ASSESSMENT
* Acute hypoxic respiratory failure secondary to COVID19 PNA  - patient with episodes of Desat - especially exertional. Patient with trending up D-dimer, team is trying to rule out PE -  patient refused CTA. For now will treat as if patient has a PE -  Lovenox Care discussed wiht Dr. Childs  - Monitor pulse ox, supplement via NC on 3.5l NC, will titrate down   - C/w  Dexamethasone and Remdesivir   Day 5, will extend   - C/w  Mucinex and Albuterol PRN   - O2 checked on ambulation desatting to 88 % on 4L  - Trend D-Dimers  - Pulm eval     * Metabolic encephalopathy / more lethargic this am  - STAT CT of the head  - Seroquel QHS  - Haldol  IM PRN   - supportive care     * Nausea and Vomiting, resolved, still poor oral intake   - possibly related to gastritis vs GERD  - C/w Pantoprazole  - Zofran PRN    # UTI E-COLI   - start ceftriaxone  day # 2    #Systolic ejection murmur  - ECHO: Mild to mod AS, EF 60-65%    # Sinus Bradycardia  - Was in 40s in ED  - C/w Tele: SR-Sbrady, HR  50-60  - TSH  elevated, mildly low Total  T3, but T4 wnl  - will need to repeat TFts in few weeks when improves     # DVT prophylaxis

## 2022-11-18 NOTE — PROGRESS NOTE ADULT - ASSESSMENT
81 y/o female recently diagnosed with COVID-19 viral syndrome was admitted on 11/11 for increased generalized weakness, SOB and cough. Pt was diagnosed with COVID 1 week PTA at [facility] and came today after experiencing increased generalized weakness since leaving ED. Pt also reports poor appetite. Not vaccinated for COVID. In ER, the patient was noted hypoxic in to 90%, abnormal CXR, started on Dexa and Remdesivir. Her remdesivir therapy was extended on 11/16 due to persistent SOB and hypoxia.     1. COVID-19 viral syndrome. Acute respiratory failure. Multifocal pneumonia. UTI with E.coli. Encephalopathy.  -respiratory frail  -has extensive pulmonary infiltrates  -obtain inflammatory markers  -on remdesivir protocol # 7 and ceftriaxone 1 gm IV qd # 3  -O2 therapy  -steroids  -AC  -droplet isolation  -respiratory care  -complete treatment with ceftriaxone  -continue antiviral therapy  -monitor temps  -f/u CBC  -supportive care  2. Other issues:   -care per medicine       83 y/o female recently diagnosed with COVID-19 viral syndrome was admitted on 11/11 for increased generalized weakness, SOB and cough. Pt was diagnosed with COVID 1 week PTA at [facility] and came today after experiencing increased generalized weakness since leaving ED. Pt also reports poor appetite. Not vaccinated for COVID. In ER, the patient was noted hypoxic in to 90%, abnormal CXR, started on Dexa and Remdesivir. Her remdesivir therapy was extended on 11/16 due to persistent SOB and hypoxia.     1. COVID-19 viral syndrome. Acute respiratory failure. Multifocal pneumonia. UTI with E.coli. Encephalopathy.  -respiratory frail  -has extensive pulmonary infiltrates  -obtain inflammatory markers  -on remdesivir protocol # 7 and ceftriaxone 1 gm IV qd # 3  -O2 therapy  -steroids  -AC  -droplet isolation  -respiratory care  -complete treatment with ceftriaxone tomorrow  -continue antiviral therapy  -monitor temps  -f/u CBC  -supportive care  2. Other issues:   -care per medicine

## 2022-11-19 LAB
ALBUMIN SERPL ELPH-MCNC: 2.5 G/DL — LOW (ref 3.3–5)
ALP SERPL-CCNC: 107 U/L — SIGNIFICANT CHANGE UP (ref 40–120)
ALT FLD-CCNC: 18 U/L — SIGNIFICANT CHANGE UP (ref 12–78)
AST SERPL-CCNC: 17 U/L — SIGNIFICANT CHANGE UP (ref 15–37)
BILIRUB DIRECT SERPL-MCNC: 0.1 MG/DL — SIGNIFICANT CHANGE UP (ref 0–0.3)
BILIRUB INDIRECT FLD-MCNC: 0.3 MG/DL — SIGNIFICANT CHANGE UP (ref 0.2–1)
BILIRUB SERPL-MCNC: 0.4 MG/DL — SIGNIFICANT CHANGE UP (ref 0.2–1.2)
CREAT SERPL-MCNC: 0.76 MG/DL — SIGNIFICANT CHANGE UP (ref 0.5–1.3)
EGFR: 78 ML/MIN/1.73M2 — SIGNIFICANT CHANGE UP
INR BLD: 1.28 RATIO — HIGH (ref 0.88–1.16)
PROT SERPL-MCNC: 6.2 GM/DL — SIGNIFICANT CHANGE UP (ref 6–8.3)
PROTHROM AB SERPL-ACNC: 14.9 SEC — HIGH (ref 10.5–13.4)

## 2022-11-19 PROCEDURE — 99233 SBSQ HOSP IP/OBS HIGH 50: CPT

## 2022-11-19 RX ADMIN — Medication 3 MILLIGRAM(S): at 21:22

## 2022-11-19 RX ADMIN — ENOXAPARIN SODIUM 50 MILLIGRAM(S): 100 INJECTION SUBCUTANEOUS at 13:21

## 2022-11-19 RX ADMIN — REMDESIVIR 500 MILLIGRAM(S): 5 INJECTION INTRAVENOUS at 21:22

## 2022-11-19 RX ADMIN — Medication 6 MILLIGRAM(S): at 13:22

## 2022-11-19 RX ADMIN — Medication 1200 MILLIGRAM(S): at 18:14

## 2022-11-19 RX ADMIN — ENOXAPARIN SODIUM 50 MILLIGRAM(S): 100 INJECTION SUBCUTANEOUS at 21:21

## 2022-11-19 RX ADMIN — Medication 1200 MILLIGRAM(S): at 13:21

## 2022-11-19 RX ADMIN — QUETIAPINE FUMARATE 25 MILLIGRAM(S): 200 TABLET, FILM COATED ORAL at 21:22

## 2022-11-19 NOTE — PROGRESS NOTE ADULT - SUBJECTIVE AND OBJECTIVE BOX
Patient is a 82y old  Female who presents with a chief complaint of covid (15 Nov 2022 11:12)      HPI:  81 y/o female with no PMHx  presents to the ED for increased generalized weakness. Pt was diagnosed with COVID  ago at [facility] and came today after experiencing increased generalized weakness since leaving ED. Pt also reports that she is not eating or drinking well as well as a few episodes of NBNB vomiting. No abdominal pain, fever, SOB, CP. Not vaccinated for COVID. Pt is [place]ib[place]ian speaking but translation provided by daughter.  Pt hypoxic in to 90%, abnormal CXR, started on Dexa and Remdesivir adm for further Rx. (11 Nov 2022 16:14)  Ddimer noted to be 504  Ambulatory desaturations to 78% were noted on 11/15  Vaccination status is unclear     PAST MEDICAL & SURGICAL HISTORY:  No pertinent past medical history      No significant past surgical history          PREVIOUS DIAGNOSTIC TESTING:      MEDICATIONS  (STANDING):  cefTRIAXone Injectable.      cefTRIAXone Injectable. 1000 milliGRAM(s) IV Push every 24 hours  dexAMETHasone  Injectable 6 milliGRAM(s) IV Push daily  enoxaparin Injectable 40 milliGRAM(s) SubCutaneous every 24 hours  guaiFENesin ER 1200 milliGRAM(s) Oral every 12 hours  QUEtiapine 25 milliGRAM(s) Oral at bedtime  remdesivir  IVPB 100 milliGRAM(s) IV Intermittent every 24 hours    MEDICATIONS  (PRN):  acetaminophen     Tablet .. 650 milliGRAM(s) Oral every 6 hours PRN Temp greater or equal to 38C (100.4F), Mild Pain (1 - 3)  albuterol    90 MICROgram(s) HFA Inhaler 2 Puff(s) Inhalation every 6 hours PRN Shortness of Breath and/or Wheezing  aluminum hydroxide/magnesium hydroxide/simethicone Suspension 30 milliLiter(s) Oral every 4 hours PRN Dyspepsia  haloperidol    Injectable 1 milliGRAM(s) IntraMuscular every 6 hours PRN agittion  melatonin 3 milliGRAM(s) Oral at bedtime PRN Insomnia  ondansetron Injectable 4 milliGRAM(s) IV Push every 8 hours PRN Nausea and/or Vomiting      FAMILY HISTORY:  No pertinent family history in first degree relatives        SOCIAL HISTORY:  ***    REVIEW OF SYSTEM:    Vital Signs Last 24 Hrs  T(C): 36.5 (16 Nov 2022 08:10), Max: 36.6 (15 Nov 2022 21:13)  T(F): 97.7 (16 Nov 2022 08:10), Max: 97.8 (15 Nov 2022 21:13)  HR: 64 (16 Nov 2022 08:10) (59 - 64)  BP: 120/74 (16 Nov 2022 08:10) (120/74 - 164/78)  BP(mean): --  RR: 19 (16 Nov 2022 08:10) (19 - 19)  SpO2: 94% (16 Nov 2022 08:10) (93% - 94%)    Parameters below as of 16 Nov 2022 08:10  Patient On (Oxygen Delivery Method): nasal cannula  O2 Flow (L/min): 3        I&O's Summary    PHYSICAL EXAM  General Appearance: cooperative, no acute distress,   HEENT: PERRL, conjunctiva clear, EOM's intact, non injected pharynx, no exudate, TM   normal  Neck: Supple, , no adenopathy, thyroid: not enlarged, no carotid bruit or JVD  Back: Symmetric, no  tenderness,no soft tissue tenderness  Lungs: diminished mikala  Heart: Regular rate and rhythm, S1, S2 normal, no murmur, rub or gallop  Abdomen: Soft, non-tender, bowel sounds active , no hepatosplenomegaly  Extremities: no cyanosis or edema, no joint swelling  Skin: Skin color, texture normal, no rashes   Neurologic: Alert and oriented X3 , cranial nerves intact, sensory and motor normal,    ECG:    LABS:                          12.4   4.76  )-----------( 173      ( 14 Nov 2022 08:15 )             36.9     11-15    x   |  x   |  x   ----------------------------<  x   x    |  x   |  0.84    Ca    8.5      14 Nov 2022 08:15    TPro  6.8  /  Alb  2.9<L>  /  TBili  0.5  /  DBili  0.1  /  AST  26  /  ALT  22  /  AlkPhos  109  11-15          Pro BNP  -- 11-15 @ 22:06  D Dimer  504 11-15 @ 22:06  Pro BNP  -- 11-13 @ 08:56  D Dimer  336 11-13 @ 08:56    PT/INR - ( 15 Nov 2022 07:26 )   PT: 13.8 sec;   INR: 1.19 ratio                   RADIOLOGY & ADDITIONAL STUDIES:     Patient is a 82y old  Female who presents with a chief complaint of covid (15 Nov 2022 11:12)      HPI:  83 y/o female with no PMHx  presents to the ED for increased generalized weakness. Pt was diagnosed with COVID  ago at [facility] and came today after experiencing increased generalized weakness since leaving ED. Pt also reports that she is not eating or drinking well as well as a few episodes of NBNB vomiting. No abdominal pain, fever, SOB, CP. Not vaccinated for COVID. Pt is Tanzanian speaking but translation provided by daughter.  Pt hypoxic in to 90%, abnormal CXR, started on Dexa and Remdesivir adm for further Rx. (11 Nov 2022 16:14)  Ddimer noted to be 504  Ambulatory desaturations to 78% were noted on 11/15  Vaccination status is unclear     11/19  No acute pulmonary events occurred overnight    PAST MEDICAL & SURGICAL HISTORY:  No pertinent past medical history      No significant past surgical history      MEDICATIONS  (STANDING):  dexAMETHasone  Injectable 6 milliGRAM(s) IV Push daily  enoxaparin Injectable 50 milliGRAM(s) SubCutaneous every 12 hours  guaiFENesin ER 1200 milliGRAM(s) Oral every 12 hours  QUEtiapine 25 milliGRAM(s) Oral at bedtime  remdesivir  IVPB 100 milliGRAM(s) IV Intermittent every 24 hours    MEDICATIONS  (PRN):  acetaminophen     Tablet .. 650 milliGRAM(s) Oral every 6 hours PRN Temp greater or equal to 38C (100.4F), Mild Pain (1 - 3)  albuterol    90 MICROgram(s) HFA Inhaler 2 Puff(s) Inhalation every 6 hours PRN Shortness of Breath and/or Wheezing  aluminum hydroxide/magnesium hydroxide/simethicone Suspension 30 milliLiter(s) Oral every 4 hours PRN Dyspepsia  haloperidol    Injectable 1 milliGRAM(s) IntraMuscular every 6 hours PRN agittion  melatonin 3 milliGRAM(s) Oral at bedtime PRN Insomnia  ondansetron Injectable 4 milliGRAM(s) IV Push every 8 hours PRN Nausea and/or Vomiting      FAMILY HISTORY:  No pertinent family history in first degree relatives        SOCIAL HISTORY:  ***    REVIEW OF SYSTEM:  Vital Signs Last 24 Hrs  T(C): 36.6 (18 Nov 2022 21:22), Max: 36.6 (18 Nov 2022 21:22)  T(F): 97.9 (18 Nov 2022 21:22), Max: 97.9 (18 Nov 2022 21:22)  HR: 68 (18 Nov 2022 21:22) (66 - 69)  BP: 143/77 (18 Nov 2022 21:22) (126/55 - 159/79)  BP(mean): --  RR: 18 (18 Nov 2022 21:22) (17 - 18)  SpO2: 93% (18 Nov 2022 21:22) (89% - 94%)    Parameters below as of 18 Nov 2022 21:22  Patient On (Oxygen Delivery Method): nasal cannula  O2 Flow (L/min): 4          I&O's Summary    PHYSICAL EXAM  General Appearance: cooperative, no acute distress,   HEENT: PERRL, conjunctiva clear, EOM's intact, non injected pharynx, no exudate, TM   normal  Neck: Supple, , no adenopathy, thyroid: not enlarged, no carotid bruit or JVD  Back: Symmetric, no  tenderness,no soft tissue tenderness  Lungs: diminished mikala  Heart: Regular rate and rhythm, S1, S2 normal, no murmur, rub or gallop  Abdomen: Soft, non-tender, bowel sounds active , no hepatosplenomegaly  Extremities: no cyanosis or edema, no joint swelling  Skin: Skin color, texture normal, no rashes   Neurologic: Alert and oriented X3 , cranial nerves intact, sensory and motor normal,    ECG:    LABS:                          12.4   4.76  )-----------( 173      ( 14 Nov 2022 08:15 )             36.9     11-15    x   |  x   |  x   ----------------------------<  x   x    |  x   |  0.84    Ca    8.5      14 Nov 2022 08:15    TPro  6.8  /  Alb  2.9<L>  /  TBili  0.5  /  DBili  0.1  /  AST  26  /  ALT  22  /  AlkPhos  109  11-15          Pro BNP  -- 11-15 @ 22:06  D Dimer  504 11-15 @ 22:06  Pro BNP  -- 11-13 @ 08:56  D Dimer  336 11-13 @ 08:56    PT/INR - ( 15 Nov 2022 07:26 )   PT: 13.8 sec;   INR: 1.19 ratio                   RADIOLOGY & ADDITIONAL STUDIES:

## 2022-11-19 NOTE — PROGRESS NOTE ADULT - ASSESSMENT
1) Hypoxemia  2) COVID Pneumonia  3) Dyspnea  4) Elevated Ddimer    83 y/o female with no PMHx  presents to the ED for increased generalized weakness. Pt was diagnosed with COVID  ago at [facility] and came today after experiencing increased generalized weakness since leaving ED. Pt also reports that she is not eating or drinking well as well as a few episodes of NBNB vomiting.   No abdominal pain, fever, SOB, CP. Not vaccinated for COVID. Pt is Egyptian speaking but translation provided by daughter.  Pt hypoxic in to 90%, abnormal CXR, started on Dexa and Remdesivir   Ddimer noted to be 504, age appropriate >800   Ambulatory desaturations to 78% were noted on 11/15  Vaccination status is unclear but patient states that she was not vaccinated   Currently on Remdesivir and Decadron  CTPE showed no PE but evidence of patchy opacities  Now receiving Lovenox 50mg q 12 hrs   Reviewed Echocardiogram  Discussed with hospitalist as well

## 2022-11-20 LAB
ALBUMIN SERPL ELPH-MCNC: 2.5 G/DL — LOW (ref 3.3–5)
ALP SERPL-CCNC: 118 U/L — SIGNIFICANT CHANGE UP (ref 40–120)
ALT FLD-CCNC: 24 U/L — SIGNIFICANT CHANGE UP (ref 12–78)
AST SERPL-CCNC: 23 U/L — SIGNIFICANT CHANGE UP (ref 15–37)
BILIRUB DIRECT SERPL-MCNC: 0.1 MG/DL — SIGNIFICANT CHANGE UP (ref 0–0.3)
BILIRUB INDIRECT FLD-MCNC: 0.3 MG/DL — SIGNIFICANT CHANGE UP (ref 0.2–1)
BILIRUB SERPL-MCNC: 0.4 MG/DL — SIGNIFICANT CHANGE UP (ref 0.2–1.2)
CREAT SERPL-MCNC: 0.72 MG/DL — SIGNIFICANT CHANGE UP (ref 0.5–1.3)
D DIMER BLD IA.RAPID-MCNC: 2392 NG/ML DDU — HIGH
EGFR: 83 ML/MIN/1.73M2 — SIGNIFICANT CHANGE UP
INR BLD: 1.25 RATIO — HIGH (ref 0.88–1.16)
PROT SERPL-MCNC: 6.5 GM/DL — SIGNIFICANT CHANGE UP (ref 6–8.3)
PROTHROM AB SERPL-ACNC: 14.5 SEC — HIGH (ref 10.5–13.4)

## 2022-11-20 PROCEDURE — 99232 SBSQ HOSP IP/OBS MODERATE 35: CPT

## 2022-11-20 RX ADMIN — Medication 1200 MILLIGRAM(S): at 22:17

## 2022-11-20 RX ADMIN — ENOXAPARIN SODIUM 50 MILLIGRAM(S): 100 INJECTION SUBCUTANEOUS at 10:09

## 2022-11-20 RX ADMIN — Medication 3 MILLIGRAM(S): at 22:19

## 2022-11-20 RX ADMIN — ENOXAPARIN SODIUM 50 MILLIGRAM(S): 100 INJECTION SUBCUTANEOUS at 22:18

## 2022-11-20 RX ADMIN — Medication 6 MILLIGRAM(S): at 10:10

## 2022-11-20 RX ADMIN — REMDESIVIR 500 MILLIGRAM(S): 5 INJECTION INTRAVENOUS at 22:23

## 2022-11-20 RX ADMIN — QUETIAPINE FUMARATE 25 MILLIGRAM(S): 200 TABLET, FILM COATED ORAL at 22:19

## 2022-11-20 RX ADMIN — Medication 1200 MILLIGRAM(S): at 10:09

## 2022-11-20 NOTE — PROGRESS NOTE ADULT - ASSESSMENT
1) Hypoxemia  2) COVID Pneumonia  3) Dyspnea  4) Elevated Ddimer    81 y/o female with no PMHx  presents to the ED for increased generalized weakness. Pt was diagnosed with COVID  ago at [facility] and came today after experiencing increased generalized weakness since leaving ED. Pt also reports that she is not eating or drinking well as well as a few episodes of NBNB vomiting.   No abdominal pain, fever, SOB, CP. Not vaccinated for COVID. Pt is Belizean speaking but translation provided by daughter.  Pt hypoxic in to 90%, abnormal CXR, started on Dexa and Remdesivir   Ddimer noted to be 504, age appropriate >800   Ambulatory desaturations to 78% were noted on 11/15  Vaccination status is unclear but patient states that she was not vaccinated   Currently on Remdesivir and Decadron  CTPE showed no PE but evidence of patchy opacities  Now receiving Lovenox 50mg q 12 hrs   Reviewed Echocardiogram  Discussed with hospitalist as well

## 2022-11-20 NOTE — PROGRESS NOTE ADULT - SUBJECTIVE AND OBJECTIVE BOX
CC: covid     HPI: 81 y/o female with no PMHx  presented  to the ED for increased generalized weakness. Pt was diagnosed with COVID 1 week ago at [facility] and came today after experiencing increased generalized weakness since leaving ED. Pt also reports that she is not eating or drinking well as well as a few episodes of NBNB vomiting. No abdominal pain, fever, SOB, CP. Not vaccinated for COVID. Pt is South Korean speaking but translation provided by daughter.  Pt hypoxic in to 90%, abnormal CXR, started on Dexa and Remdesivir adm for further Rx.     Medical progress: Patient more awake. More energetic today. Able to go to the bathroom without any difficulties. Denies any HA, CP. Still on 4L of O2. Patient daughter has been updated.   Complaints: no new complaints  State of mind: normal /  but patient lacks understanding of her clinical state      REVIEW OF SYSTEMS:  All other review of systems is negative unless indicated above.    PHYSICAL EXAM:  T(C): 36.3 (20 Nov 2022 08:30), Max: 36.7 (19 Nov 2022 15:26)  T(F): 97.3 (20 Nov 2022 08:30), Max: 98 (19 Nov 2022 15:26)  HR: 59 (20 Nov 2022 08:30) (57 - 65)  BP: 154/65 (20 Nov 2022 08:30) (119/58 - 154/65)  RR: 18 (20 Nov 2022 08:30) (17 - 18)  SpO2: 93% (19 Nov 2022 23:19) (93% - 94%)  General: deconditioned frail, sleepy /  lethargic  Eyes:  EOMI; conjunctiva and sclera clear  Head: Normocephalic; atraumatic  ENMT: No nasal discharge; airway clear  Neck: Supple; non tender; no masses  Respiratory: Decreased BS,  No rhonchi, No wheezes  Cardiovascular: Regular rate and rhythm. S1 and S2 Normal; + murmur  Gastrointestinal: Soft non-tender non-distended; Normal bowel sounds  Genitourinary: No  suprapubic  tenderness  Extremities: No edema  Vascular: Peripheral pulses palpable 2+ bilaterally  Neurological: Alert and oriented x 2, mildly confused,   speech is clear, No facial asymmetry, no pronator drift   Skin: Warm and dry. No acute rash  Musculoskeletal: Normal muscle tone, without deformities    LABS:       PT/INR - ( 20 Nov 2022 07:07 )   PT: 14.5 sec;   INR: 1.25 ratio      x   |  x   |  x   ----------------------------<  x   x    |  x   |  0.72      TPro  6.5  /  Alb  2.5<L>  /  TBili  0.4  /  DBili  0.1  /  AST  23  /  ALT  24  /  AlkPhos  118  11-20

## 2022-11-20 NOTE — PROGRESS NOTE ADULT - ASSESSMENT
* Acute hypoxic respiratory failure secondary to COVID19 PNA  - patient with episodes of Desat - especially exertional. Patient with trending up D-dimer, team is trying to rule out PE -  patient refused CTA. For now will treat as if patient has a PE -  Lovenox Care discussed wiht Dr. Childs  - Monitor pulse ox, supplement via NC on 3.5l NC, will titrate down   - C/w  Dexamethasone and Remdesivir   Day 5, will extend   - C/w  Mucinex and Albuterol PRN   - O2 checked on ambulation desatting to 90s % on 4L  - Trend D-Dimers  - Pulm eval / ID following    * Metabolic encephalopathy / more lethargic this am  - resolved    * Nausea and Vomiting, resolved, still poor oral intake   - resolved    # UTI E-COLI   - start ceftriaxone  day # 3  - no signs and symptoms    #Systolic ejection murmur  - ECHO: Mild to mod AS, EF 60-65%    # Sinus Bradycardia  - Was in 40s in ED  - C/w Tele: SR-Sbrady, HR  50-60  - TSH  elevated, mildly low Total  T3, but T4 wnl  - will need to repeat TFts in few weeks when improves     # DVT prophylaxis      * Acute hypoxic respiratory failure secondary to COVID19 PNAr  - Monitor pulse ox, supplement via NC on 3.5l NC, will titrate down   - C/w  Dexamethasone and Remdesivir   Day 5, will extend   - C/w  Mucinex and Albuterol PRN   - O2 checked on ambulation desatting to 90s % on 4L  - Trend D-Dimers today, if trending down, in the setting of no DVT /  PE -  will change full anti-coagulation to proph  - Pulm eval / ID following    * Metabolic encephalopathy / more lethargic this am  - resolved    * Nausea and Vomiting, resolved, still poor oral intake   - resolved    # UTI E-COLI   - start ceftriaxone  day # 3  - no signs and symptoms    #Systolic ejection murmur  - ECHO: Mild to mod AS, EF 60-65%    # Sinus Bradycardia  - Was in 40s in ED  - C/w Tele: SR-Sbrady, HR  50-60  - TSH  elevated, mildly low Total  T3, but T4 wnl  - will need to repeat TFts in few weeks when improves     # DVT prophylaxis   - Trend D-Dimers today, if trending down, in the setting of no DVT /  PE -  will change full anti-coagulation to proph

## 2022-11-20 NOTE — PROGRESS NOTE ADULT - SUBJECTIVE AND OBJECTIVE BOX
Patient is a 82y old  Female who presents with a chief complaint of covid (15 Nov 2022 11:12)      HPI:  83 y/o female with no PMHx  presents to the ED for increased generalized weakness. Pt was diagnosed with COVID  ago at [facility] and came today after experiencing increased generalized weakness since leaving ED. Pt also reports that she is not eating or drinking well as well as a few episodes of NBNB vomiting. No abdominal pain, fever, SOB, CP. Not vaccinated for COVID. Pt is Burmese speaking but translation provided by daughter.  Pt hypoxic in to 90%, abnormal CXR, started on Dexa and Remdesivir adm for further Rx. (11 Nov 2022 16:14)  Ddimer noted to be 504  Ambulatory desaturations to 78% were noted on 11/15  Vaccination status is unclear     11/20  No acute pulmonary events occurred overnight    PAST MEDICAL & SURGICAL HISTORY:  No pertinent past medical history      No significant past surgical history      MEDICATIONS  (STANDING):  dexAMETHasone  Injectable 6 milliGRAM(s) IV Push daily  enoxaparin Injectable 50 milliGRAM(s) SubCutaneous every 12 hours  guaiFENesin ER 1200 milliGRAM(s) Oral every 12 hours  QUEtiapine 25 milliGRAM(s) Oral at bedtime  remdesivir  IVPB 100 milliGRAM(s) IV Intermittent every 24 hours    MEDICATIONS  (PRN):  acetaminophen     Tablet .. 650 milliGRAM(s) Oral every 6 hours PRN Temp greater or equal to 38C (100.4F), Mild Pain (1 - 3)  albuterol    90 MICROgram(s) HFA Inhaler 2 Puff(s) Inhalation every 6 hours PRN Shortness of Breath and/or Wheezing  aluminum hydroxide/magnesium hydroxide/simethicone Suspension 30 milliLiter(s) Oral every 4 hours PRN Dyspepsia  haloperidol    Injectable 1 milliGRAM(s) IntraMuscular every 6 hours PRN agittion  melatonin 3 milliGRAM(s) Oral at bedtime PRN Insomnia  ondansetron Injectable 4 milliGRAM(s) IV Push every 8 hours PRN Nausea and/or Vomiting      FAMILY HISTORY:  No pertinent family history in first degree relatives      Vital Signs Last 24 Hrs  T(C): 36.7 (19 Nov 2022 23:19), Max: 36.7 (19 Nov 2022 08:27)  T(F): 98 (19 Nov 2022 23:19), Max: 98 (19 Nov 2022 08:27)  HR: 57 (19 Nov 2022 23:19) (57 - 65)  BP: 144/54 (19 Nov 2022 23:19) (119/58 - 144/54)  BP(mean): --  RR: 18 (19 Nov 2022 23:19) (17 - 18)  SpO2: 93% (19 Nov 2022 23:19) (91% - 94%)    Parameters below as of 19 Nov 2022 23:19  Patient On (Oxygen Delivery Method): nasal cannula  O2 Flow (L/min): 4          I&O's Summary    PHYSICAL EXAM  General Appearance: cooperative, no acute distress,   HEENT: PERRL, conjunctiva clear, EOM's intact, non injected pharynx, no exudate, TM   normal  Neck: Supple, , no adenopathy, thyroid: not enlarged, no carotid bruit or JVD  Back: Symmetric, no  tenderness,no soft tissue tenderness  Lungs: diminished mikala  Heart: Regular rate and rhythm, S1, S2 normal, no murmur, rub or gallop  Abdomen: Soft, non-tender, bowel sounds active , no hepatosplenomegaly  Extremities: no cyanosis or edema, no joint swelling  Skin: Skin color, texture normal, no rashes   Neurologic: Alert and oriented X3 , cranial nerves intact, sensory and motor normal,    ECG:    LABS:                          12.4   4.76  )-----------( 173      ( 14 Nov 2022 08:15 )             36.9     11-15    x   |  x   |  x   ----------------------------<  x   x    |  x   |  0.84    Ca    8.5      14 Nov 2022 08:15    TPro  6.8  /  Alb  2.9<L>  /  TBili  0.5  /  DBili  0.1  /  AST  26  /  ALT  22  /  AlkPhos  109  11-15          Pro BNP  -- 11-15 @ 22:06  D Dimer  504 11-15 @ 22:06  Pro BNP  -- 11-13 @ 08:56  D Dimer  336 11-13 @ 08:56    PT/INR - ( 15 Nov 2022 07:26 )   PT: 13.8 sec;   INR: 1.19 ratio                   RADIOLOGY & ADDITIONAL STUDIES:

## 2022-11-21 LAB
ANION GAP SERPL CALC-SCNC: 8 MMOL/L — SIGNIFICANT CHANGE UP (ref 5–17)
BUN SERPL-MCNC: 27 MG/DL — HIGH (ref 7–23)
CALCIUM SERPL-MCNC: 8.2 MG/DL — LOW (ref 8.5–10.1)
CHLORIDE SERPL-SCNC: 114 MMOL/L — HIGH (ref 96–108)
CO2 SERPL-SCNC: 19 MMOL/L — LOW (ref 22–31)
CREAT SERPL-MCNC: 0.79 MG/DL — SIGNIFICANT CHANGE UP (ref 0.5–1.3)
EGFR: 74 ML/MIN/1.73M2 — SIGNIFICANT CHANGE UP
GLUCOSE SERPL-MCNC: 139 MG/DL — HIGH (ref 70–99)
HCT VFR BLD CALC: 37.7 % — SIGNIFICANT CHANGE UP (ref 34.5–45)
HGB BLD-MCNC: 12.8 G/DL — SIGNIFICANT CHANGE UP (ref 11.5–15.5)
INR BLD: 1.21 RATIO — HIGH (ref 0.88–1.16)
MCHC RBC-ENTMCNC: 27.9 PG — SIGNIFICANT CHANGE UP (ref 27–34)
MCHC RBC-ENTMCNC: 34 GM/DL — SIGNIFICANT CHANGE UP (ref 32–36)
MCV RBC AUTO: 82.3 FL — SIGNIFICANT CHANGE UP (ref 80–100)
PLATELET # BLD AUTO: 272 K/UL — SIGNIFICANT CHANGE UP (ref 150–400)
POTASSIUM SERPL-MCNC: 3.6 MMOL/L — SIGNIFICANT CHANGE UP (ref 3.5–5.3)
POTASSIUM SERPL-SCNC: 3.6 MMOL/L — SIGNIFICANT CHANGE UP (ref 3.5–5.3)
PROTHROM AB SERPL-ACNC: 14.1 SEC — HIGH (ref 10.5–13.4)
RBC # BLD: 4.58 M/UL — SIGNIFICANT CHANGE UP (ref 3.8–5.2)
RBC # FLD: 13.8 % — SIGNIFICANT CHANGE UP (ref 10.3–14.5)
SODIUM SERPL-SCNC: 141 MMOL/L — SIGNIFICANT CHANGE UP (ref 135–145)
WBC # BLD: 7.2 K/UL — SIGNIFICANT CHANGE UP (ref 3.8–10.5)
WBC # FLD AUTO: 7.2 K/UL — SIGNIFICANT CHANGE UP (ref 3.8–10.5)

## 2022-11-21 PROCEDURE — 99232 SBSQ HOSP IP/OBS MODERATE 35: CPT

## 2022-11-21 RX ADMIN — Medication 3 MILLIGRAM(S): at 21:23

## 2022-11-21 RX ADMIN — QUETIAPINE FUMARATE 25 MILLIGRAM(S): 200 TABLET, FILM COATED ORAL at 21:23

## 2022-11-21 RX ADMIN — Medication 6 MILLIGRAM(S): at 10:41

## 2022-11-21 RX ADMIN — ENOXAPARIN SODIUM 50 MILLIGRAM(S): 100 INJECTION SUBCUTANEOUS at 21:23

## 2022-11-21 RX ADMIN — Medication 1200 MILLIGRAM(S): at 10:41

## 2022-11-21 RX ADMIN — ENOXAPARIN SODIUM 50 MILLIGRAM(S): 100 INJECTION SUBCUTANEOUS at 10:42

## 2022-11-21 RX ADMIN — Medication 1200 MILLIGRAM(S): at 21:23

## 2022-11-21 NOTE — PROGRESS NOTE ADULT - SUBJECTIVE AND OBJECTIVE BOX
Date of service: 11-21-22 @ 14:20    Lying in bed in NAD  Alert and mild confused  Has dry cough  No SOB    ROS: no fever or chills; no HA, no abdominal pain, no diarrhea or constipation; no dysuria, no legs pain, no rashes    MEDICATIONS  (STANDING):  enoxaparin Injectable 50 milliGRAM(s) SubCutaneous every 12 hours  guaiFENesin ER 1200 milliGRAM(s) Oral every 12 hours  QUEtiapine 25 milliGRAM(s) Oral at bedtime    Vital Signs Last 24 Hrs  T(C): 36.9 (21 Nov 2022 09:56), Max: 36.9 (21 Nov 2022 09:56)  T(F): 98.4 (21 Nov 2022 09:56), Max: 98.4 (21 Nov 2022 09:56)  HR: 51 (21 Nov 2022 09:56) (51 - 55)  BP: 137/55 (21 Nov 2022 09:56) (133/55 - 137/55)  BP(mean): --  RR: 17 (21 Nov 2022 09:56) (17 - 17)  SpO2: 97% (21 Nov 2022 09:56) (95% - 97%)    Parameters below as of 21 Nov 2022 09:56  Patient On (Oxygen Delivery Method): nasal cannula     Physical exam:    Constitutional:  No acute distress  HEENT: NC/AT, EOMI, PERRLA, conjunctivae clear; ears and nose atraumatic  Neck: supple; thyroid not palpable  Back: no tenderness  Respiratory: respiratory effort normal; crackles b/l  Cardiovascular: S1S2 regular, no murmurs  Abdomen: soft, not tender, not distended, positive BS  Genitourinary: no suprapubic tenderness  Lymphatic: no LN palpable  Musculoskeletal: no muscle tenderness, no joint swelling or tenderness  Extremities: no pedal edema  Neurological/ Psychiatric: Alert; moving all extremities  Skin: no rashes; no palpable lesions    Labs: all available labs reviewed                        13.3   8.32  )-----------( 223      ( 16 Nov 2022 07:22 )             39.3     11-17    x   |  x   |  x   ----------------------------<  x   x    |  x   |  0.80    Ca    9.0      16 Nov 2022 07:22    TPro  6.9  /  Alb  2.8<L>  /  TBili  0.5  /  DBili  0.1  /  AST  19  /  ALT  24  /  AlkPhos  117  11-17     LIVER FUNCTIONS - ( 17 Nov 2022 07:28 )  Alb: 2.8 g/dL / Pro: 6.9 gm/dL / ALK PHOS: 117 U/L / ALT: 24 U/L / AST: 19 U/L / GGT: x           Culture - Urine (collected 14 Nov 2022 13:20)  Source: Clean Catch Clean Catch (Midstream)  Final Report (16 Nov 2022 18:46):    >100,000 CFU/ml Escherichia coli  Organism: Escherichia coli (16 Nov 2022 18:46)  Organism: Escherichia coli (16 Nov 2022 18:46)      -  Amikacin: S <=16      -  Amoxicillin/Clavulanic Acid: S <=8/4      -  Ampicillin: S <=8 These ampicillin results predict results for amoxicillin      -  Ampicillin/Sulbactam: S <=4/2 Enterobacter, Klebsiella aerogenes, Citrobacter, and Serratia may develop resistance during prolonged therapy (3-4 days)      -  Aztreonam: S <=4      -  Cefazolin: S <=2 For uncomplicated UTI with K. pneumoniae, E. coli, or P. mirablis: MIGUEL <=16 is sensitive and MIGUEL >=32 is resistant. This also predicts results for oral agents cefaclor, cefdinir, cefpodoxime, cefprozil, cefuroxime axetil, cephalexin and locarbef for uncomplicated UTI. Note that some isolates may be susceptible to these agents while testing resistant to cefazolin.      -  Cefepime: S <=2      -  Cefoxitin: S <=8      -  Ceftriaxone: S <=1 Enterobacter, Klebsiella aerogenes, Citrobacter, and Serratia may develop resistance during prolonged therapy      -  Ciprofloxacin: S <=0.25      -  Ertapenem: S <=0.5      -  Gentamicin: S <=2      -  Imipenem: S <=1      -  Levofloxacin: S <=0.5      -  Meropenem: S <=1      -  Nitrofurantoin: S <=32 Should not be used to treat pyelonephritis      -  Piperacillin/Tazobactam: S <=8      -  Tobramycin: S <=2      -  Trimethoprim/Sulfamethoxazole: S <=0.5/9.5      Method Type: MIGUEL    Radiology: all available radiological tests reviewed    < from: CT Angio Chest PE Protocol w/ IV Cont (11.17.22 @ 11:12) >  IMPRESSION: Limited pulmonary embolism study for evaluation of some of   the subsegmental or smaller pulmonary arterial branches due to   respiratory motion artifact. No pulmonary arterial emboli within the well visualized pulmonary arteries.    Widespread bilateral nodular and patchy opacities as described above   representing pneumonia. Considerations includeCovid 19 given the distribution.    1-3 month follow-up noncontrast chest CT is recommended to ensure resolution.    1.1 cm indeterminate partially imaged left renal lesion. Renal ultrasound can be performed for further evaluation.  < end of copied text >      Advanced directives addressed: full resuscitation

## 2022-11-21 NOTE — PROGRESS NOTE ADULT - SUBJECTIVE AND OBJECTIVE BOX
CC: covid     HPI: 81 y/o female with no PMHx  presented  to the ED for increased generalized weakness. Pt was diagnosed with COVID 1 week ago at [facility] and came today after experiencing increased generalized weakness since leaving ED. Pt also reports that she is not eating or drinking well as well as a few episodes of NBNB vomiting. No abdominal pain, fever, SOB, CP. Not vaccinated for COVID. Pt is Japanese speaking but translation provided by daughter.  Pt hypoxic in to 90%, abnormal CXR, started on Dexa and Remdesivir adm for further Rx.     INTERVAL HPI/ OVERNIGHT EVENTS:  Pt was seen and examined,            Vital Signs Last 24 Hrs  T(C): 36.9 (2022 09:56), Max: 36.9 (2022 09:56)  T(F): 98.4 (2022 09:56), Max: 98.4 (2022 09:56)  HR: 51 (2022 09:56) (51 - 55)  BP: 137/55 (2022 09:56) (133/55 - 137/55)  RR: 17 (2022 09:56) (17 - 17)  SpO2: 97% (2022 09:56) (95% - 97%)    Parameters below as of 2022 09:56  Patient On (Oxygen Delivery Method): nasal cannula        REVIEW OF SYSTEMS:  All other review of systems is negative unless indicated above.      PHYSICAL EXAM:  General: Well developed; frail elderly female, agitated   Eyes:  EOMI; conjunctiva and sclera clear  Head: Normocephalic; atraumatic  ENMT: No nasal discharge; airway clear  Neck: Supple; non tender; no masses  Respiratory: Decreased BS,  No rhonchi, No wheezes  Cardiovascular: Regular rate and rhythm. S1 and S2 Normal; + murmur  Gastrointestinal: Soft non-tender non-distended; Normal bowel sounds  Genitourinary: No  suprapubic  tenderness  Extremities: No edema  Vascular: Peripheral pulses palpable 2+ bilaterally  Neurological: Alert and oriented x 2, mildly confused,   speech is clear, No facial asymmetry, no pronator drift   Skin: Warm and dry. No acute rash  Musculoskeletal: Normal muscle tone, without deformities          LABS:                         12.4   4.76  )-----------( 173      ( 2022 08:15 )             36.9     11-15    x   |  x   |  x   ----------------------------<  x   x    |  x   |  0.84    Ca    8.5      2022 08:15    TPro  6.8  /  Alb  2.9<L>  /  TBili  0.5  /  DBili  0.1  /  AST  26  /  ALT  22  /  AlkPhos  109  11-15    LIVER FUNCTIONS - ( 15 Nov 2022 07:26 )  Alb: 2.9 g/dL / Pro: 6.8 gm/dL / ALK PHOS: 109 U/L / ALT: 22 U/L / AST: 26 U/L / GGT: x         PT/INR - ( 15 Nov 2022 07:26 )   PT: 13.8 sec;   INR: 1.19 ratio                              12.4   4.76  )-----------( 173      ( 2022 08:15 )             36.9     11-14    138  |  109<H>  |  22  ----------------------------<  128<H>  3.8   |  22  |  1.04    Ca    8.5      2022 08:15    TPro  6.5  /  Alb  2.8<L>  /  TBili  0.4  /  DBili  0.1  /  AST  32  /  ALT  20  /  AlkPhos  100  1114  LIVER FUNCTIONS - ( 2022 08:15 )  Alb: 2.8 g/dL / Pro: 6.5 gm/dL / ALK PHOS: 100 U/L / ALT: 20 U/L / AST: 32 U/L / GGT: x           PT/INR - ( 2022 08:15 )   PT: 13.9 sec;   INR: 1.20 ratio                                13.3   5.58  )-----------( 191      ( 2022 08:56 )             40.4     11-13    139  |  108  |  25<H>  ----------------------------<  147<H>  3.3<L>   |  20<L>  |  0.92    Ca    9.0      2022 08:56    TPro  7.5  /  Alb  3.3  /  TBili  0.5  /  DBili  0.2  /  AST  33  /  ALT  22  /  AlkPhos  113  11-13        LIVER FUNCTIONS - ( 2022 08:56 )  Alb: 3.3 g/dL / Pro: 7.5 gm/dL / ALK PHOS: 113 U/L / ALT: 22 U/L / AST: 33 U/L / GGT: x           PT/INR - ( 2022 08:56 )   PT: 13.2 sec;   INR: 1.14 ratio                              12.4   3.68  )-----------( 141      ( 2022 08:49 )             36.3     2022 08:49    141    |  112    |  24     ----------------------------<  123    3.6     |  22     |  0.83     Ca    8.6        2022 08:49  Mg     2.0       2022 09:51    TPro  6.4    /  Alb  2.9    /  TBili  0.3    /  DBili  0.1    /  AST  31     /  ALT  22     /  AlkPhos  97     2022 08:49    PT/INR - ( 2022 08:49 )   PT: 11.9 sec;   INR: 1.03 ratio        LIVER FUNCTIONS - ( 2022 08:49 )  Alb: 2.9 g/dL / Pro: 6.4 gm/dL / ALK PHOS: 97 U/L / ALT: 22 U/L / AST: 31 U/L / GGT: x           Urinalysis Basic - ( 2022 12:00 )    Color: Yellow / Appearance: Clear / S.015 / pH: x  Gluc: x / Ketone: Small  / Bili: Negative / Urobili: 4   Blood: x / Protein: Trace mg/dL / Nitrite: Positive   Leuk Esterase: Trace / RBC: 0-2 /HPF / WBC 6-10   Sq Epi: x / Non Sq Epi: Occasional / Bacteria: Many    Culture - Urine in AM (22 @ 13:20)   Specimen Source: Clean Catch Clean Catch (Midstream)   Culture Results:   >100,000 CFU/ml Escherichia coli     MEDICATIONS  (STANDING):  cefTRIAXone Injectable.      cefTRIAXone Injectable. 1000 milliGRAM(s) IV Push once  dexAMETHasone     Tablet 6 milliGRAM(s) Oral daily  enoxaparin Injectable 40 milliGRAM(s) SubCutaneous every 24 hours  guaiFENesin ER 1200 milliGRAM(s) Oral every 12 hours  QUEtiapine 25 milliGRAM(s) Oral at bedtime  remdesivir  IVPB 100 milliGRAM(s) IV Intermittent every 24 hours  remdesivir  IVPB   IV Intermittent     MEDICATIONS  (PRN):  acetaminophen     Tablet .. 650 milliGRAM(s) Oral every 6 hours PRN Temp greater or equal to 38C (100.4F), Mild Pain (1 - 3)  albuterol    90 MICROgram(s) HFA Inhaler 2 Puff(s) Inhalation every 6 hours PRN Shortness of Breath and/or Wheezing  aluminum hydroxide/magnesium hydroxide/simethicone Suspension 30 milliLiter(s) Oral every 4 hours PRN Dyspepsia  haloperidol    Injectable 1 milliGRAM(s) IntraMuscular every 6 hours PRN agittion  melatonin 3 milliGRAM(s) Oral at bedtime PRN Insomnia  ondansetron Injectable 4 milliGRAM(s) IV Push every 8 hours PRN Nausea and/or Vomiting    RADIOLOGY & ADDITIONAL TESTS:    ACC: [id number] EXAM:  XR CHEST PORTABLE URGENT 1V                          PROCEDURE DATE:  2022          INTERPRETATION:  AP erect chest on 2022 at 9:36 AM. Patient   has Covid.    Heart magnified by technique.    Mild right lower thoracic curve again noted.    Present film shows a few small scattered infiltrates consistent with   Covid. Infiltrates are new since 2015.    IMPRESSION: Small scattered infiltrates.

## 2022-11-21 NOTE — PROGRESS NOTE ADULT - ASSESSMENT
*Acute hypoxic respiratory failure secondary to COVID19 multifocal PNA, overall improved   Monitor pulse ox, supplement via NC NC, still desats with ambulation on 4L   Completed 10 days course of   Dexamethasone and Remdesivir  C/w  Mucinex and Albuterol PRN   O2 checked on ambulation desatting to 88 % on 4L  D-dimers elevated, LE doppler and CTA neg for VTE  C/w Tx dose A/c for now   D/w Dr Mejia       * Agitation Confusion, likely due to metabolic encephalopathy and delirium  2/2 infection and dehydration, improved    supportive care   Seroquel QHS  Haldol  IM PRN   supportive care     * Nausea and Vomiting, resolved,   oral intake is better   possibly related to gastritis vs GERD  C/w Pantoprazole  Zofran PRN      * ECOLI UTI  UCX:  + >100K   Completed 5 days IV  ceftriaxone   ID recs appreciated       * Systolic ejection murmur  ECHO: Mild to mod AS, EF 60-65%  will need outPt f/u with cardio       * Sinus Bradycardia, resolved   C/w Tele  TSH  elevated, mildly low Total  T3, but T4 wnl  will need to repeat TFts in few weeks when improves       *  DVT prophylaxis: C/w low molecular weight heparin

## 2022-11-21 NOTE — PROGRESS NOTE ADULT - SUBJECTIVE AND OBJECTIVE BOX
Patient is a 82y old  Female who presents with a chief complaint of covid (15 Nov 2022 11:12)      HPI:  81 y/o female with no PMHx  presents to the ED for increased generalized weakness. Pt was diagnosed with COVID  ago at [facility] and came today after experiencing increased generalized weakness since leaving ED. Pt also reports that she is not eating or drinking well as well as a few episodes of NBNB vomiting. No abdominal pain, fever, SOB, CP. Not vaccinated for COVID. Pt is Martiniquais speaking but translation provided by daughter.  Pt hypoxic in to 90%, abnormal CXR, started on Dexa and Remdesivir adm for further Rx. (11 Nov 2022 16:14)  Ddimer noted to be 504  Ambulatory desaturations to 78% were noted on 11/15  Vaccination status is unclear     11/20  No acute pulmonary events occurred overnight  Ddimer has significantly increased from 500-->1157-->2300 despite being on Lovenox 50q12    PAST MEDICAL & SURGICAL HISTORY:  No pertinent past medical history      No significant past surgical history      MEDICATIONS  (STANDING):  dexAMETHasone  Injectable 6 milliGRAM(s) IV Push daily  enoxaparin Injectable 50 milliGRAM(s) SubCutaneous every 12 hours  guaiFENesin ER 1200 milliGRAM(s) Oral every 12 hours  QUEtiapine 25 milliGRAM(s) Oral at bedtime    MEDICATIONS  (PRN):  acetaminophen     Tablet .. 650 milliGRAM(s) Oral every 6 hours PRN Temp greater or equal to 38C (100.4F), Mild Pain (1 - 3)  albuterol    90 MICROgram(s) HFA Inhaler 2 Puff(s) Inhalation every 6 hours PRN Shortness of Breath and/or Wheezing  aluminum hydroxide/magnesium hydroxide/simethicone Suspension 30 milliLiter(s) Oral every 4 hours PRN Dyspepsia  melatonin 3 milliGRAM(s) Oral at bedtime PRN Insomnia  ondansetron Injectable 4 milliGRAM(s) IV Push every 8 hours PRN Nausea and/or Vomiting      Vital Signs Last 24 Hrs  T(C): 36.6 (20 Nov 2022 20:50), Max: 36.6 (20 Nov 2022 20:50)  T(F): 97.9 (20 Nov 2022 20:50), Max: 97.9 (20 Nov 2022 20:50)  HR: 55 (20 Nov 2022 20:50) (55 - 59)  BP: 133/55 (20 Nov 2022 20:50) (133/55 - 154/65)  BP(mean): --  RR: 17 (20 Nov 2022 20:50) (17 - 18)  SpO2: 95% (20 Nov 2022 20:50) (94% - 95%)    Parameters below as of 20 Nov 2022 20:50  Patient On (Oxygen Delivery Method): nasal cannula  O2 Flow (L/min): 4          I&O's Summary    PHYSICAL EXAM  General Appearance: cooperative, no acute distress,   HEENT: PERRL, conjunctiva clear, EOM's intact, non injected pharynx, no exudate, TM   normal  Neck: Supple, , no adenopathy, thyroid: not enlarged, no carotid bruit or JVD  Back: Symmetric, no  tenderness,no soft tissue tenderness  Lungs: diminished mikala  Heart: Regular rate and rhythm, S1, S2 normal, no murmur, rub or gallop  Abdomen: Soft, non-tender, bowel sounds active , no hepatosplenomegaly  Extremities: no cyanosis or edema, no joint swelling  Skin: Skin color, texture normal, no rashes   Neurologic: Alert and oriented X3 , cranial nerves intact, sensory and motor normal,    ECG:    LABS:                          12.4   4.76  )-----------( 173      ( 14 Nov 2022 08:15 )             36.9     11-15    x   |  x   |  x   ----------------------------<  x   x    |  x   |  0.84    Ca    8.5      14 Nov 2022 08:15    TPro  6.8  /  Alb  2.9<L>  /  TBili  0.5  /  DBili  0.1  /  AST  26  /  ALT  22  /  AlkPhos  109  11-15          Pro BNP  -- 11-15 @ 22:06  D Dimer  504 11-15 @ 22:06  Pro BNP  -- 11-13 @ 08:56  D Dimer  336 11-13 @ 08:56    PT/INR - ( 15 Nov 2022 07:26 )   PT: 13.8 sec;   INR: 1.19 ratio                   RADIOLOGY & ADDITIONAL STUDIES:

## 2022-11-21 NOTE — PROGRESS NOTE ADULT - ASSESSMENT
1) Hypoxemia  2) COVID Pneumonia  3) Dyspnea  4) Elevated Ddimer    83 y/o female with no PMHx  presents to the ED for increased generalized weakness. Pt was diagnosed with COVID  ago at [facility] and came today after experiencing increased generalized weakness since leaving ED. Pt also reports that she is not eating or drinking well as well as a few episodes of NBNB vomiting.   No abdominal pain, fever, SOB, CP. Not vaccinated for COVID. Pt is Scottish speaking but translation provided by daughter.  Pt hypoxic in to 90%, abnormal CXR, started on Dexa and Remdesivir   Ddimer noted to be 504, age appropriate >800   Ambulatory desaturations to 78% were noted on 11/15  Vaccination status is unclear but patient states that she was not vaccinated   Currently on Remdesivir and Decadron  CTPE showed no PE but evidence of patchy opacities  Now receiving Lovenox 50mg q 12 hrs , ddimer trend noted but clinically, she is improving and requiring less O2  Reviewed Echocardiogram, no PH noted  Will discuss further with hospitalist

## 2022-11-21 NOTE — PROGRESS NOTE ADULT - ASSESSMENT
81 y/o female recently diagnosed with COVID-19 viral syndrome was admitted on 11/11 for increased generalized weakness, SOB and cough. Pt was diagnosed with COVID 1 week PTA at [facility] and came today after experiencing increased generalized weakness since leaving ED. Pt also reports poor appetite. Not vaccinated for COVID. In ER, the patient was noted hypoxic in to 90%, abnormal CXR, started on Dexa and Remdesivir. Her remdesivir therapy was extended on 11/16 due to persistent SOB and hypoxia.     1. COVID-19 viral syndrome. Acute respiratory failure. Multifocal pneumonia. UTI with E.coli. Encephalopathy.  -respiratory frail  -has extensive pulmonary infiltrates  -obtain inflammatory markers  -on remdesivir protocol # 10   -s/p ceftriaxone 1 gm IV qd # 5  -O2 therapy  -steroids  -AC  -droplet isolation  -respiratory care  -complete antiviral therapy  -monitor temps  -f/u CBC  -supportive care  2. Other issues:   -care per medicine

## 2022-11-22 LAB
ALBUMIN SERPL ELPH-MCNC: 2.8 G/DL — LOW (ref 3.3–5)
ALP SERPL-CCNC: 182 U/L — HIGH (ref 40–120)
ALT FLD-CCNC: 35 U/L — SIGNIFICANT CHANGE UP (ref 12–78)
AST SERPL-CCNC: 26 U/L — SIGNIFICANT CHANGE UP (ref 15–37)
BASE EXCESS BLDA CALC-SCNC: -5.3 MMOL/L — LOW (ref -2–3)
BILIRUB DIRECT SERPL-MCNC: 0.2 MG/DL — SIGNIFICANT CHANGE UP (ref 0–0.3)
BILIRUB INDIRECT FLD-MCNC: 0.3 MG/DL — SIGNIFICANT CHANGE UP (ref 0.2–1)
BILIRUB SERPL-MCNC: 0.5 MG/DL — SIGNIFICANT CHANGE UP (ref 0.2–1.2)
CO2 BLDA-SCNC: 16 MMOL/L — LOW (ref 19–24)
CREAT SERPL-MCNC: 0.76 MG/DL — SIGNIFICANT CHANGE UP (ref 0.5–1.3)
D DIMER BLD IA.RAPID-MCNC: 2584 NG/ML DDU — HIGH
EGFR: 78 ML/MIN/1.73M2 — SIGNIFICANT CHANGE UP
HCO3 BLDA-SCNC: 16 MMOL/L — LOW (ref 21–28)
INR BLD: 1.18 RATIO — HIGH (ref 0.88–1.16)
PCO2 BLDA: 20 MMHG — LOW (ref 32–35)
PH BLDA: 7.5 — HIGH (ref 7.35–7.45)
PO2 BLDA: 66 MMHG — LOW (ref 83–108)
PROT SERPL-MCNC: 6.9 GM/DL — SIGNIFICANT CHANGE UP (ref 6–8.3)
PROTHROM AB SERPL-ACNC: 13.7 SEC — HIGH (ref 10.5–13.4)
SAO2 % BLDA: 95 % — SIGNIFICANT CHANGE UP

## 2022-11-22 PROCEDURE — 71045 X-RAY EXAM CHEST 1 VIEW: CPT | Mod: 26

## 2022-11-22 PROCEDURE — 99232 SBSQ HOSP IP/OBS MODERATE 35: CPT

## 2022-11-22 RX ADMIN — Medication 1200 MILLIGRAM(S): at 23:07

## 2022-11-22 RX ADMIN — ENOXAPARIN SODIUM 50 MILLIGRAM(S): 100 INJECTION SUBCUTANEOUS at 11:58

## 2022-11-22 RX ADMIN — ENOXAPARIN SODIUM 50 MILLIGRAM(S): 100 INJECTION SUBCUTANEOUS at 23:04

## 2022-11-22 RX ADMIN — Medication 1200 MILLIGRAM(S): at 11:58

## 2022-11-22 RX ADMIN — QUETIAPINE FUMARATE 25 MILLIGRAM(S): 200 TABLET, FILM COATED ORAL at 23:08

## 2022-11-22 NOTE — PROGRESS NOTE ADULT - ASSESSMENT
*Acute hypoxic respiratory failure secondary to COVID19 multifocal PNA, overall improved   Monitor pulse ox, supplement via NC NC, still desats with ambulation on 4L to mid-high 80s   Completed 10 days course of   Dexamethasone and Remdesivir  C/w  Mucinex and Albuterol PRN   O2 checked on ambulation desatting to 88 % on 4L  D-dimers elevated, LE doppler and CTA neg for VTE  C/w Tx dose A/c for now   repeat CXR reviewed, pending reports   ABG reviewed   D/w Dr Mejia        * Agitation Confusion, likely due to metabolic encephalopathy and delirium  2/2 infection and dehydration, improved    supportive care   Seroquel QHS  Haldol  IM PRN   supportive care     * Nausea and Vomiting, resolved,   oral intake is better   possibly related to gastritis vs GERD  C/w Pantoprazole  Zofran PRN      * ECOLI UTI  UCX:  + >100K   Completed 5 days IV  ceftriaxone   ID recs appreciated       * Systolic ejection murmur  ECHO: Mild to mod AS, EF 60-65%  will need outPt f/u with cardio       * Sinus Bradycardia, resolved   TSH  elevated, mildly low Total  T3, but T4 wnl  will need to repeat TFts in few weeks when improves       *  DVT prophylaxis: C/w low molecular weight heparin     D/w Pts Daughter Aniya, updated on Pts condition and plan

## 2022-11-22 NOTE — PROGRESS NOTE ADULT - ASSESSMENT
1) Hypoxemia  2) COVID Pneumonia  3) Dyspnea  4) Elevated Ddimer    81 y/o female with no PMHx  presents to the ED for increased generalized weakness. Pt was diagnosed with COVID  ago at [facility] and came today after experiencing increased generalized weakness since leaving ED. Pt also reports that she is not eating or drinking well as well as a few episodes of NBNB vomiting.   No abdominal pain, fever, SOB, CP. Not vaccinated for COVID. Pt is [place]al[place] speaking but translation provided by daughter.  Pt hypoxic in to 90%, abnormal CXR, started on Dexa and Remdesivir   Ddimer noted to be 504, age appropriate >800   Ambulatory desaturations to 78% were noted on 11/15  Vaccination status is unclear but patient states that she was not vaccinated   Currently on Remdesivir and Decadron  CTPE showed no PE but evidence of patchy opacities  Now receiving Lovenox 50mg q 12 hrs , ddimer trend noted but clinically, she is improving and requiring 4-6L NC O2  Reviewed Echocardiogram, no PH noted  Will discuss further with hospitalist      1) Hypoxemia  2) COVID Pneumonia  3) Dyspnea  4) Elevated Ddimer    81 y/o female with no PMHx  presents to the ED for increased generalized weakness. Pt was diagnosed with COVID  ago at [facility] and came today after experiencing increased generalized weakness since leaving ED. Pt also reports that she is not eating or drinking well as well as a few episodes of NBNB vomiting.   No abdominal pain, fever, SOB, CP. Not vaccinated for COVID. Pt is [place]ian speaking but translation provided by daughter.  Pt hypoxic in to 90%, abnormal CXR, started on Dexa and Remdesivir   Ddimer noted to be 504, age appropriate >800   Ambulatory desaturations to 78% were noted on 11/15  Vaccination status is unclear but patient states that she was not vaccinated   Currently on Remdesivir and Decadron  CTPE showed no PE but evidence of patchy opacities  Now receiving Lovenox 50mg q 12 hrs , ddimer trend noted but clinically, she is improving and requiring 4-6L NC O2  Reviewed Echocardiogram, no PH noted  Repeat CXR  Discussed with Hospitalist  Dr Zepeda to cover 11/23-11/27

## 2022-11-22 NOTE — PROGRESS NOTE ADULT - SUBJECTIVE AND OBJECTIVE BOX
Patient is a 82y old  Female who presents with a chief complaint of covid (15 Nov 2022 11:12)      HPI:  81 y/o female with no PMHx  presents to the ED for increased generalized weakness. Pt was diagnosed with COVID  ago at [facility] and came today after experiencing increased generalized weakness since leaving ED. Pt also reports that she is not eating or drinking well as well as a few episodes of NBNB vomiting. No abdominal pain, fever, SOB, CP. Not vaccinated for COVID. Pt is O[place]i speaking but translation provided by daughter.  Pt hypoxic in to 90%, abnormal CXR, started on Dexa and Remdesivir adm for further Rx. (11 Nov 2022 16:14)  Ddimer noted to be 504  Ambulatory desaturations to 78% were noted on 11/15  Vaccination status is unclear     11/22  No acute pulmonary events occurred overnight  Ddimer has significantly increased from 500-->1157-->2300 despite being on Lovenox 50q12    PAST MEDICAL & SURGICAL HISTORY:  No pertinent past medical history      No significant past surgical history      MEDICATIONS  (STANDING):  dexAMETHasone  Injectable 6 milliGRAM(s) IV Push daily  enoxaparin Injectable 50 milliGRAM(s) SubCutaneous every 12 hours  guaiFENesin ER 1200 milliGRAM(s) Oral every 12 hours  QUEtiapine 25 milliGRAM(s) Oral at bedtime    MEDICATIONS  (PRN):  acetaminophen     Tablet .. 650 milliGRAM(s) Oral every 6 hours PRN Temp greater or equal to 38C (100.4F), Mild Pain (1 - 3)  albuterol    90 MICROgram(s) HFA Inhaler 2 Puff(s) Inhalation every 6 hours PRN Shortness of Breath and/or Wheezing  aluminum hydroxide/magnesium hydroxide/simethicone Suspension 30 milliLiter(s) Oral every 4 hours PRN Dyspepsia  melatonin 3 milliGRAM(s) Oral at bedtime PRN Insomnia  ondansetron Injectable 4 milliGRAM(s) IV Push every 8 hours PRN Nausea and/or Vomiting      Vital Signs Last 24 Hrs  T(C): 36.8 (22 Nov 2022 04:40), Max: 36.9 (21 Nov 2022 09:56)  T(F): 98.3 (22 Nov 2022 04:40), Max: 98.4 (21 Nov 2022 09:56)  HR: 76 (22 Nov 2022 04:40) (51 - 76)  BP: 169/90 (22 Nov 2022 04:40) (137/55 - 191/79)  BP(mean): --  RR: 18 (22 Nov 2022 04:40) (17 - 19)  SpO2: 95% (22 Nov 2022 04:40) (94% - 98%)    Parameters below as of 22 Nov 2022 04:40  Patient On (Oxygen Delivery Method): nasal cannula  O2 Flow (L/min): 6        I&O's Summary    PHYSICAL EXAM  General Appearance: cooperative, no acute distress,   HEENT: PERRL, conjunctiva clear, EOM's intact, non injected pharynx, no exudate, TM   normal  Neck: Supple, , no adenopathy, thyroid: not enlarged, no carotid bruit or JVD  Back: Symmetric, no  tenderness,no soft tissue tenderness  Lungs: diminished mikala  Heart: Regular rate and rhythm, S1, S2 normal, no murmur, rub or gallop  Abdomen: Soft, non-tender, bowel sounds active , no hepatosplenomegaly  Extremities: no cyanosis or edema, no joint swelling  Skin: Skin color, texture normal, no rashes   Neurologic: Alert and oriented X3 , cranial nerves intact, sensory and motor normal,    ECG:    LABS:                          12.4   4.76  )-----------( 173      ( 14 Nov 2022 08:15 )             36.9     11-15    x   |  x   |  x   ----------------------------<  x   x    |  x   |  0.84    Ca    8.5      14 Nov 2022 08:15    TPro  6.8  /  Alb  2.9<L>  /  TBili  0.5  /  DBili  0.1  /  AST  26  /  ALT  22  /  AlkPhos  109  11-15          Pro BNP  -- 11-15 @ 22:06  D Dimer  504 11-15 @ 22:06  Pro BNP  -- 11-13 @ 08:56  D Dimer  336 11-13 @ 08:56    PT/INR - ( 15 Nov 2022 07:26 )   PT: 13.8 sec;   INR: 1.19 ratio                   RADIOLOGY & ADDITIONAL STUDIES:     Patient is a 82y old  Female who presents with a chief complaint of covid (15 Nov 2022 11:12)      HPI:  83 y/o female with no PMHx  presents to the ED for increased generalized weakness. Pt was diagnosed with COVID  ago at [facility] and came today after experiencing increased generalized weakness since leaving ED. Pt also reports that she is not eating or drinking well as well as a few episodes of NBNB vomiting. No abdominal pain, fever, SOB, CP. Not vaccinated for COVID. Pt is Belgian speaking but translation provided by daughter.  Pt hypoxic in to 90%, abnormal CXR, started on Dexa and Remdesivir adm for further Rx. (11 Nov 2022 16:14)  Ddimer noted to be 504  Ambulatory desaturations to 78% were noted on 11/15  Vaccination status is unclear     11/22  No acute pulmonary events occurred overnight  Ddimer has significantly increased from 500-->1157-->2300-->2500 despite being on Lovenox 50q12    PAST MEDICAL & SURGICAL HISTORY:  No pertinent past medical history      No significant past surgical history      MEDICATIONS  (STANDING):  dexAMETHasone  Injectable 6 milliGRAM(s) IV Push daily  enoxaparin Injectable 50 milliGRAM(s) SubCutaneous every 12 hours  guaiFENesin ER 1200 milliGRAM(s) Oral every 12 hours  QUEtiapine 25 milliGRAM(s) Oral at bedtime    MEDICATIONS  (PRN):  acetaminophen     Tablet .. 650 milliGRAM(s) Oral every 6 hours PRN Temp greater or equal to 38C (100.4F), Mild Pain (1 - 3)  albuterol    90 MICROgram(s) HFA Inhaler 2 Puff(s) Inhalation every 6 hours PRN Shortness of Breath and/or Wheezing  aluminum hydroxide/magnesium hydroxide/simethicone Suspension 30 milliLiter(s) Oral every 4 hours PRN Dyspepsia  melatonin 3 milliGRAM(s) Oral at bedtime PRN Insomnia  ondansetron Injectable 4 milliGRAM(s) IV Push every 8 hours PRN Nausea and/or Vomiting      Vital Signs Last 24 Hrs  T(C): 36.8 (22 Nov 2022 04:40), Max: 36.9 (21 Nov 2022 09:56)  T(F): 98.3 (22 Nov 2022 04:40), Max: 98.4 (21 Nov 2022 09:56)  HR: 76 (22 Nov 2022 04:40) (51 - 76)  BP: 169/90 (22 Nov 2022 04:40) (137/55 - 191/79)  BP(mean): --  RR: 18 (22 Nov 2022 04:40) (17 - 19)  SpO2: 95% (22 Nov 2022 04:40) (94% - 98%)    Parameters below as of 22 Nov 2022 04:40  Patient On (Oxygen Delivery Method): nasal cannula  O2 Flow (L/min): 6        I&O's Summary    PHYSICAL EXAM  General Appearance: cooperative, no acute distress,   HEENT: PERRL, conjunctiva clear, EOM's intact, non injected pharynx, no exudate, TM   normal  Neck: Supple, , no adenopathy, thyroid: not enlarged, no carotid bruit or JVD  Back: Symmetric, no  tenderness,no soft tissue tenderness  Lungs: diminished mikala  Heart: Regular rate and rhythm, S1, S2 normal, no murmur, rub or gallop  Abdomen: Soft, non-tender, bowel sounds active , no hepatosplenomegaly  Extremities: no cyanosis or edema, no joint swelling  Skin: Skin color, texture normal, no rashes   Neurologic: Alert and oriented X3 , cranial nerves intact, sensory and motor normal,    ECG:    LABS:                          12.4   4.76  )-----------( 173      ( 14 Nov 2022 08:15 )             36.9     11-15    x   |  x   |  x   ----------------------------<  x   x    |  x   |  0.84    Ca    8.5      14 Nov 2022 08:15    TPro  6.8  /  Alb  2.9<L>  /  TBili  0.5  /  DBili  0.1  /  AST  26  /  ALT  22  /  AlkPhos  109  11-15          Pro BNP  -- 11-15 @ 22:06  D Dimer  504 11-15 @ 22:06  Pro BNP  -- 11-13 @ 08:56  D Dimer  336 11-13 @ 08:56    PT/INR - ( 15 Nov 2022 07:26 )   PT: 13.8 sec;   INR: 1.19 ratio                   RADIOLOGY & ADDITIONAL STUDIES:

## 2022-11-22 NOTE — PROGRESS NOTE ADULT - SUBJECTIVE AND OBJECTIVE BOX
CC: covid     HPI: 83 y/o female with no PMHx  presented  to the ED for increased generalized weakness. Pt was diagnosed with COVID 1 week ago at [facility] and came today after experiencing increased generalized weakness since leaving ED. Pt also reports that she is not eating or drinking well as well as a few episodes of NBNB vomiting. No abdominal pain, fever, SOB, CP. Not vaccinated for COVID. Pt is [place] speaking but translation provided by daughter.  Pt hypoxic in to 90%, abnormal CXR, started on Dexa and Remdesivir adm for further Rx.     INTERVAL HPI/ OVERNIGHT EVENTS:  Pt was seen and examined,  reports no new complains, states that was nervious when walked to the bathroom, weak. still desating on ambulation. Oral intake better     Vital Signs Last 24 Hrs  T(C): 36.9 (2022 17:07), Max: 36.9 (2022 08:32)  T(F): 98.4 (2022 17:07), Max: 98.5 (2022 08:32)  HR: 75 (2022 17:07) (55 - 76)  BP: 106/85 (2022 17:07) (106/85 - 191/79)  RR: 18 (2022 17:07) (18 - 19)  SpO2: 92% (2022 17:07) (92% - 98%)    Parameters below as of 2022 17:07  Patient On (Oxygen Delivery Method): nasal cannula  O2 Flow (L/min): 4        REVIEW OF SYSTEMS:  All other review of systems is negative unless indicated above.      PHYSICAL EXAM:  General: Well developed; frail elderly female, agitated   Eyes:  EOMI; conjunctiva and sclera clear  Head: Normocephalic; atraumatic  ENMT: No nasal discharge; airway clear  Neck: Supple; non tender; no masses  Respiratory: Decreased BS,  No rhonchi, No wheezes  Cardiovascular: Regular rate and rhythm. S1 and S2 Normal; + murmur  Gastrointestinal: Soft non-tender non-distended; Normal bowel sounds  Genitourinary: No  suprapubic  tenderness  Extremities: No edema  Vascular: Peripheral pulses palpable 2+ bilaterally  Neurological: Alert and oriented x 2, mildly confused,   speech is clear, No facial asymmetry, no pronator drift   Skin: Warm and dry. No acute rash  Musculoskeletal: Normal muscle tone, without deformities          LABS:                         12.8   7.20  )-----------( 272      ( 2022 07:46 )             37.7     11-    x   |  x   |  x   ----------------------------<  x   x    |  x   |  0.76    Ca    8.2<L>      2022 07:46    TPro  6.9  /  Alb  2.8<L>  /  TBili  0.5  /  DBili  0.2  /  AST  26  /  ALT  35  /  AlkPhos  182<H>      LIVER FUNCTIONS - ( 2022 06:35 )  Alb: 2.8 g/dL / Pro: 6.9 gm/dL / ALK PHOS: 182 U/L / ALT: 35 U/L / AST: 26 U/L / GGT: x           PT/INR - ( 2022 06:35 )   PT: 13.7 sec;   INR: 1.18 ratio        ABG - ( 2022 11:55 )  pH, Arterial: 7.50  pH, Blood: x     /  pCO2: 20    /  pO2: 66    / HCO3: 16    / Base Excess: -5.3  /  SaO2: 95                                12.4   4.76  )-----------( 173      ( 2022 08:15 )             36.9     11-15    x   |  x   |  x   ----------------------------<  x   x    |  x   |  0.84    Ca    8.5      2022 08:15    TPro  6.8  /  Alb  2.9<L>  /  TBili  0.5  /  DBili  0.1  /  AST  26  /  ALT  22  /  AlkPhos  109  11-15    LIVER FUNCTIONS - ( 15 Nov 2022 07:26 )  Alb: 2.9 g/dL / Pro: 6.8 gm/dL / ALK PHOS: 109 U/L / ALT: 22 U/L / AST: 26 U/L / GGT: x         PT/INR - ( 15 Nov 2022 07:26 )   PT: 13.8 sec;   INR: 1.19 ratio                              12.4   4.76  )-----------( 173      ( 2022 08:15 )             36.9     11-    138  |  109<H>  |  22  ----------------------------<  128<H>  3.8   |  22  |  1.04    Ca    8.5      2022 08:15    TPro  6.5  /  Alb  2.8<L>  /  TBili  0.4  /  DBili  0.1  /  AST  32  /  ALT  20  /  AlkPhos  100  11  LIVER FUNCTIONS - ( 2022 08:15 )  Alb: 2.8 g/dL / Pro: 6.5 gm/dL / ALK PHOS: 100 U/L / ALT: 20 U/L / AST: 32 U/L / GGT: x           PT/INR - ( 2022 08:15 )   PT: 13.9 sec;   INR: 1.20 ratio                                13.3   5.58  )-----------( 191      ( 2022 08:56 )             40.4     11-    139  |  108  |  25<H>  ----------------------------<  147<H>  3.3<L>   |  20<L>  |  0.92    Ca    9.0      2022 08:56    TPro  7.5  /  Alb  3.3  /  TBili  0.5  /  DBili  0.2  /  AST  33  /  ALT  22  /  AlkPhos  113  11        LIVER FUNCTIONS - ( 2022 08:56 )  Alb: 3.3 g/dL / Pro: 7.5 gm/dL / ALK PHOS: 113 U/L / ALT: 22 U/L / AST: 33 U/L / GGT: x           PT/INR - ( 2022 08:56 )   PT: 13.2 sec;   INR: 1.14 ratio                              12.4   3.68  )-----------( 141      ( 2022 08:49 )             36.3     2022 08:49    141    |  112    |  24     ----------------------------<  123    3.6     |  22     |  0.83     Ca    8.6        2022 08:49  Mg     2.0       2022 09:51    TPro  6.4    /  Alb  2.9    /  TBili  0.3    /  DBili  0.1    /  AST  31     /  ALT  22     /  AlkPhos  97     2022 08:49    PT/INR - ( 2022 08:49 )   PT: 11.9 sec;   INR: 1.03 ratio        LIVER FUNCTIONS - ( 2022 08:49 )  Alb: 2.9 g/dL / Pro: 6.4 gm/dL / ALK PHOS: 97 U/L / ALT: 22 U/L / AST: 31 U/L / GGT: x           Urinalysis Basic - ( 2022 12:00 )    Color: Yellow / Appearance: Clear / S.015 / pH: x  Gluc: x / Ketone: Small  / Bili: Negative / Urobili: 4   Blood: x / Protein: Trace mg/dL / Nitrite: Positive   Leuk Esterase: Trace / RBC: 0-2 /HPF / WBC 6-10   Sq Epi: x / Non Sq Epi: Occasional / Bacteria: Many    Culture - Urine in AM (22 @ 13:20)   Specimen Source: Clean Catch Clean Catch (Midstream)   Culture Results:   >100,000 CFU/ml Escherichia coli     MEDICATIONS  (STANDING):  cefTRIAXone Injectable.      cefTRIAXone Injectable. 1000 milliGRAM(s) IV Push once  dexAMETHasone     Tablet 6 milliGRAM(s) Oral daily  enoxaparin Injectable 40 milliGRAM(s) SubCutaneous every 24 hours  guaiFENesin ER 1200 milliGRAM(s) Oral every 12 hours  QUEtiapine 25 milliGRAM(s) Oral at bedtime  remdesivir  IVPB 100 milliGRAM(s) IV Intermittent every 24 hours  remdesivir  IVPB   IV Intermittent     MEDICATIONS  (PRN):  acetaminophen     Tablet .. 650 milliGRAM(s) Oral every 6 hours PRN Temp greater or equal to 38C (100.4F), Mild Pain (1 - 3)  albuterol    90 MICROgram(s) HFA Inhaler 2 Puff(s) Inhalation every 6 hours PRN Shortness of Breath and/or Wheezing  aluminum hydroxide/magnesium hydroxide/simethicone Suspension 30 milliLiter(s) Oral every 4 hours PRN Dyspepsia  haloperidol    Injectable 1 milliGRAM(s) IntraMuscular every 6 hours PRN agittion  melatonin 3 milliGRAM(s) Oral at bedtime PRN Insomnia  ondansetron Injectable 4 milliGRAM(s) IV Push every 8 hours PRN Nausea and/or Vomiting    RADIOLOGY & ADDITIONAL TESTS:    ACC: [id number] EXAM:  XR CHEST PORTABLE URGENT 1V                          PROCEDURE DATE:  2022          INTERPRETATION:  AP erect chest on 2022 at 9:36 AM. Patient   has Covid.    Heart magnified by technique.    Mild right lower thoracic curve again noted.    Present film shows a few small scattered infiltrates consistent with   Covid. Infiltrates are new since 2015.    IMPRESSION: Small scattered infiltrates.

## 2022-11-23 LAB
ALBUMIN SERPL ELPH-MCNC: 2.9 G/DL — LOW (ref 3.3–5)
ALP SERPL-CCNC: 228 U/L — HIGH (ref 40–120)
ALT FLD-CCNC: 47 U/L — SIGNIFICANT CHANGE UP (ref 12–78)
ANION GAP SERPL CALC-SCNC: 9 MMOL/L — SIGNIFICANT CHANGE UP (ref 5–17)
AST SERPL-CCNC: 36 U/L — SIGNIFICANT CHANGE UP (ref 15–37)
BASE EXCESS BLDA CALC-SCNC: -3.8 MMOL/L — LOW (ref -2–3)
BILIRUB DIRECT SERPL-MCNC: 0.1 MG/DL — SIGNIFICANT CHANGE UP (ref 0–0.3)
BILIRUB INDIRECT FLD-MCNC: 0.4 MG/DL — SIGNIFICANT CHANGE UP (ref 0.2–1)
BILIRUB SERPL-MCNC: 0.5 MG/DL — SIGNIFICANT CHANGE UP (ref 0.2–1.2)
BLOOD GAS COMMENTS ARTERIAL: SIGNIFICANT CHANGE UP
BUN SERPL-MCNC: 27 MG/DL — HIGH (ref 7–23)
CALCIUM SERPL-MCNC: 8.8 MG/DL — SIGNIFICANT CHANGE UP (ref 8.5–10.1)
CHLORIDE SERPL-SCNC: 109 MMOL/L — HIGH (ref 96–108)
CO2 BLDA-SCNC: 20 MMOL/L — SIGNIFICANT CHANGE UP (ref 19–24)
CO2 SERPL-SCNC: 22 MMOL/L — SIGNIFICANT CHANGE UP (ref 22–31)
CREAT SERPL-MCNC: 1.08 MG/DL — SIGNIFICANT CHANGE UP (ref 0.5–1.3)
D DIMER BLD IA.RAPID-MCNC: 2673 NG/ML DDU — HIGH
EGFR: 51 ML/MIN/1.73M2 — LOW
ERYTHROCYTE [SEDIMENTATION RATE] IN BLOOD: 20 MM/HR — SIGNIFICANT CHANGE UP (ref 0–20)
FERRITIN SERPL-MCNC: 226 NG/ML — HIGH (ref 15–150)
GLUCOSE SERPL-MCNC: 182 MG/DL — HIGH (ref 70–99)
HCO3 BLDA-SCNC: 19 MMOL/L — LOW (ref 21–28)
HCT VFR BLD CALC: 44.1 % — SIGNIFICANT CHANGE UP (ref 34.5–45)
HGB BLD-MCNC: 14.8 G/DL — SIGNIFICANT CHANGE UP (ref 11.5–15.5)
INR BLD: 1.09 RATIO — SIGNIFICANT CHANGE UP (ref 0.88–1.16)
LDH SERPL L TO P-CCNC: 252 U/L — HIGH (ref 84–241)
MCHC RBC-ENTMCNC: 28 PG — SIGNIFICANT CHANGE UP (ref 27–34)
MCHC RBC-ENTMCNC: 33.6 GM/DL — SIGNIFICANT CHANGE UP (ref 32–36)
MCV RBC AUTO: 83.4 FL — SIGNIFICANT CHANGE UP (ref 80–100)
PCO2 BLDA: 27 MMHG — LOW (ref 32–35)
PH BLDA: 7.45 — SIGNIFICANT CHANGE UP (ref 7.35–7.45)
PLATELET # BLD AUTO: 367 K/UL — SIGNIFICANT CHANGE UP (ref 150–400)
PO2 BLDA: 66 MMHG — LOW (ref 83–108)
POTASSIUM SERPL-MCNC: 3.3 MMOL/L — LOW (ref 3.5–5.3)
POTASSIUM SERPL-SCNC: 3.3 MMOL/L — LOW (ref 3.5–5.3)
PROT SERPL-MCNC: 7.4 GM/DL — SIGNIFICANT CHANGE UP (ref 6–8.3)
PROTHROM AB SERPL-ACNC: 12.6 SEC — SIGNIFICANT CHANGE UP (ref 10.5–13.4)
RBC # BLD: 5.29 M/UL — HIGH (ref 3.8–5.2)
RBC # FLD: 14.1 % — SIGNIFICANT CHANGE UP (ref 10.3–14.5)
SAO2 % BLDA: 95 % — SIGNIFICANT CHANGE UP
SODIUM SERPL-SCNC: 140 MMOL/L — SIGNIFICANT CHANGE UP (ref 135–145)
WBC # BLD: 10.3 K/UL — SIGNIFICANT CHANGE UP (ref 3.8–10.5)
WBC # FLD AUTO: 10.3 K/UL — SIGNIFICANT CHANGE UP (ref 3.8–10.5)

## 2022-11-23 PROCEDURE — 99232 SBSQ HOSP IP/OBS MODERATE 35: CPT

## 2022-11-23 RX ORDER — RIVAROXABAN 15 MG-20MG
1 KIT ORAL
Qty: 60 | Refills: 0
Start: 2022-11-23 | End: 2022-12-22

## 2022-11-23 RX ORDER — POTASSIUM CHLORIDE 20 MEQ
40 PACKET (EA) ORAL EVERY 4 HOURS
Refills: 0 | Status: COMPLETED | OUTPATIENT
Start: 2022-11-23 | End: 2022-11-23

## 2022-11-23 RX ADMIN — Medication 40 MILLIGRAM(S): at 12:17

## 2022-11-23 RX ADMIN — Medication 40 MILLIGRAM(S): at 21:03

## 2022-11-23 RX ADMIN — Medication 40 MILLIEQUIVALENT(S): at 17:05

## 2022-11-23 RX ADMIN — ENOXAPARIN SODIUM 50 MILLIGRAM(S): 100 INJECTION SUBCUTANEOUS at 12:17

## 2022-11-23 RX ADMIN — Medication 1200 MILLIGRAM(S): at 12:17

## 2022-11-23 RX ADMIN — Medication 3 MILLIGRAM(S): at 21:04

## 2022-11-23 RX ADMIN — ENOXAPARIN SODIUM 50 MILLIGRAM(S): 100 INJECTION SUBCUTANEOUS at 21:03

## 2022-11-23 RX ADMIN — Medication 40 MILLIEQUIVALENT(S): at 17:26

## 2022-11-23 RX ADMIN — QUETIAPINE FUMARATE 25 MILLIGRAM(S): 200 TABLET, FILM COATED ORAL at 21:03

## 2022-11-23 RX ADMIN — Medication 1200 MILLIGRAM(S): at 17:26

## 2022-11-23 NOTE — PROGRESS NOTE ADULT - ASSESSMENT
1) Hypoxemia  2) COVID Pneumonia  3) Dyspnea  4) Elevated Ddimer    81 y/o female with no PMHx  presents to the ED for increased generalized weakness. Pt was diagnosed with COVID  ago at [facility] and came today after experiencing increased generalized weakness since leaving ED. Pt also reports that she is not eating or drinking well as well as a few episodes of NBNB vomiting.   No abdominal pain, fever, SOB, CP. Not vaccinated for COVID. Pt is Citizen of [place] speaking but translation provided by daughter.  Pt hypoxic in to 90%, abnormal CXR, started on Dexa and Remdesivir   Ddimer noted to be 504, age appropriate >800   Ambulatory desaturations to 78% were noted on 11/15  Vaccination status is unclear but patient states that she was not vaccinated   Currently on Remdesivir and Decadron  CTPE showed no PE but evidence of patchy opacities  Now receiving Lovenox 50mg q 12 hrs , ddimer trend noted but clinically, she is improving and requiring 4-6L NC O2  Reviewed Echocardiogram, no PH noted    11/23  seen and examined  covering for Dr Childs  worried about her worsened infiltrates and resp status overall  appears weak while in bed on O2 nc  lethargic early am but arousable  if any worse may need transfer to ICU setting  overall prognosis remains guarded  cont with steroids /get ABG  will discuss with ID

## 2022-11-23 NOTE — PROGRESS NOTE ADULT - SUBJECTIVE AND OBJECTIVE BOX
Patient is a 82y old  Female who presents with a chief complaint of covid (15 Nov 2022 11:12)      HPI:  81 y/o female with no PMHx  presents to the ED for increased generalized weakness. Pt was diagnosed with COVID  ago at [facility] and came today after experiencing increased generalized weakness since leaving ED. Pt also reports that she is not eating or drinking well as well as a few episodes of NBNB vomiting. No abdominal pain, fever, SOB, CP. Not vaccinated for COVID. Pt is Syrian speaking but translation provided by daughter.  Pt hypoxic in to 90%, abnormal CXR, started on Dexa and Remdesivir adm for further Rx. (11 Nov 2022 16:14)  Ddimer noted to be 504  Ambulatory desaturations to 78% were noted on 11/15  Vaccination status is unclear     11/22  No acute pulmonary events occurred overnight  Ddimer has significantly increased from 500-->1157-->2300-->2500 despite being on Lovenox 50q12  11/23  seen and examined  covering for Dr Childs  worried about her worsened infiltrates and resp status overall  appears weak while in bed on O2 nc  lethargic early am but arousable    PAST MEDICAL & SURGICAL HISTORY:  No pertinent past medical history      No significant past surgical history      MEDICATIONS  (STANDING):  dexAMETHasone  Injectable 6 milliGRAM(s) IV Push daily  enoxaparin Injectable 50 milliGRAM(s) SubCutaneous every 12 hours  guaiFENesin ER 1200 milliGRAM(s) Oral every 12 hours  QUEtiapine 25 milliGRAM(s) Oral at bedtime    MEDICATIONS  (PRN):  acetaminophen     Tablet .. 650 milliGRAM(s) Oral every 6 hours PRN Temp greater or equal to 38C (100.4F), Mild Pain (1 - 3)  albuterol    90 MICROgram(s) HFA Inhaler 2 Puff(s) Inhalation every 6 hours PRN Shortness of Breath and/or Wheezing  aluminum hydroxide/magnesium hydroxide/simethicone Suspension 30 milliLiter(s) Oral every 4 hours PRN Dyspepsia  melatonin 3 milliGRAM(s) Oral at bedtime PRN Insomnia  ondansetron Injectable 4 milliGRAM(s) IV Push every 8 hours PRN Nausea and/or Vomiting      Vital Signs Last 24 Hrs  T(C): 36.6 (23 Nov 2022 06:16), Max: 36.9 (22 Nov 2022 08:32)  T(F): 97.9 (23 Nov 2022 06:16), Max: 98.5 (22 Nov 2022 08:32)  HR: 71 (23 Nov 2022 06:16) (61 - 75)  BP: 120/72 (23 Nov 2022 06:16) (106/85 - 137/81)  BP(mean): --  RR: 18 (23 Nov 2022 06:16) (18 - 18)  SpO2: 99% (23 Nov 2022 06:16) (92% - 99%)    Parameters below as of 23 Nov 2022 06:16  Patient On (Oxygen Delivery Method): nasal cannula  O2 Flow (L/min): 4        I&O's Summary    PHYSICAL EXAM  General Appearance: cooperative, no acute distress,   HEENT: PERRL, conjunctiva clear, EOM's intact, non injected pharynx, no exudate, TM   normal  Neck: Supple, , no adenopathy, thyroid: not enlarged, no carotid bruit or JVD  Back: Symmetric, no  tenderness,no soft tissue tenderness  Lungs: diminished mikala, bibasilar rhonchi  Heart: Regular rate and rhythm, S1, S2 normal, no murmur, rub or gallop  Abdomen: Soft, non-tender, bowel sounds active , no hepatosplenomegaly  Extremities: no cyanosis or edema, no joint swelling  Skin: Skin color, texture normal, no rashes   Neurologic: Alert and oriented X3 , cranial nerves intact, sensory and motor normal,    ECG:    LABS:                          12.4   4.76  )-----------( 173      ( 14 Nov 2022 08:15 )             36.9     11-15    x   |  x   |  x   ----------------------------<  x   x    |  x   |  0.84    Ca    8.5      14 Nov 2022 08:15    TPro  6.8  /  Alb  2.9<L>  /  TBili  0.5  /  DBili  0.1  /  AST  26  /  ALT  22  /  AlkPhos  109  11-15          Pro BNP  -- 11-15 @ 22:06  D Dimer  504 11-15 @ 22:06  Pro BNP  -- 11-13 @ 08:56  D Dimer  336 11-13 @ 08:56    PT/INR - ( 15 Nov 2022 07:26 )   PT: 13.8 sec;   INR: 1.19 ratio                   RADIOLOGY & ADDITIONAL STUDIES:    < from: CT Angio Chest PE Protocol w/ IV Cont (11.17.22 @ 11:12) >  No enlarged axillary, mediastinal or hilar lymph nodes.    No pericardial or pleural effusions. Vascular calcifications with   involvement of the aorta and the coronary arteries. Intimal calcified and   noncalcified plaques involving the aorta.    Evaluation of the upper abdomen demonstrate partially imaged   indeterminate left renal nodule, within the mid to lower pole, part of   which measures about 1.1 cm.    Evaluation of lungs demonstrate numerous bilateral patchy and nodular   opacities throughout the mid to lower lungs, right greater than left.   Many of the opacities have a peripheral distribution.    No central endobronchial lesions.    Degenerative changes of the spine and the shoulders. Spinal scoliosis.    IMPRESSION: Limited pulmonary embolism study for evaluation of some of   the subsegmental or smaller pulmonary arterial branches due to   respiratory motion artifact. No pulmonary arterial emboli within the well   visualized pulmonary arteries.    Widespread bilateral nodular and patchy opacities as described above   representing pneumonia. Considerations includeCovid 19 given the   distribution.    1-3 month follow-up noncontrast chest CT is recommended to ensure   resolution.    1.1 cm indeterminate partially imaged left renal lesion. Renal ultrasound   can be performed for further evaluation.    < end of copied text >

## 2022-11-23 NOTE — PROGRESS NOTE ADULT - ASSESSMENT
*Acute hypoxic respiratory failure secondary to COVID19 multifocal PNA, overall improved. Elevated D-Dimer   Monitor pulse ox, supplement via NC  as needed, tolerated RA today   Completed 10 days course of   Dexamethasone and Remdesivir  started on Solumedrol 40mg BID as per Pulm recs   C/w  Mucinex and Albuterol PRN   O2 checked on ambulation  lowest 90%, recovers with rest   D-dimers elevated, LE doppler and CTA neg for VTE  C/w Tx dose A/c, change to Xarelto   ABG reviewed   D/w Dr Zepeda         * Agitation Confusion, likely due to metabolic encephalopathy and delirium  2/2 infection and dehydration, improved    supportive care   Seroquel QHS  Haldol  IM PRN       * Nausea and Vomiting, resolved  oral intake is better   possibly related to gastritis vs GERD  C/w Pantoprazole  Zofran PRN      * ECOLI UTI  UCX:  + >100K   Completed 5 days IV  ceftriaxone         * Systolic ejection murmur  ECHO: Mild to mod AS, EF 60-65%  will need outPt f/u with cardio       * Sinus Bradycardia, resolved   TSH  elevated, mildly low Total  T3, but T4 wnl  will need to repeat TFts in few weeks when improves       *  DVT prophylaxis: start XArelto     D/w Pts Daughter Aniya, updated on Pts condition and  d/c  plan     Dispo; Monitor overnight, d/c planning in am if stable

## 2022-11-23 NOTE — PROGRESS NOTE ADULT - SUBJECTIVE AND OBJECTIVE BOX
CC: covid     HPI: 81 y/o female with no PMHx  presented  to the ED for increased generalized weakness. Pt was diagnosed with COVID 1 week ago at [facility] and came today after experiencing increased generalized weakness since leaving ED. Pt also reports that she is not eating or drinking well as well as a few episodes of NBNB vomiting. No abdominal pain, fever, SOB, CP. Not vaccinated for COVID. Pt is Kitt[place] speaking but translation provided by daughter.  Pt hypoxic in to 90%, abnormal CXR, started on Dexa and Remdesivir adm for further Rx.     INTERVAL HPI/ OVERNIGHT EVENTS:  Pt was seen and examined,   reports no complains, feels better. Good oral intake.  Ambulated Pt in the room, no Supplemental  O2, no noted desaturation. Plan discussed       Vital Signs Last 24 Hrs  T(C): 36.7 (2022 16:57), Max: 36.7 (2022 23:42)  T(F): 98 (2022 16:57), Max: 98 (2022 23:42)  HR: 70 (2022 16:57) (70 - 89)  BP: 121/66 (2022 16:57) (118/68 - 134/77)  RR: 18 (2022 16:57) (18 - 18)  SpO2: 98% (2022 16:57) (94% - 99%)    Parameters below as of 2022 16:57  Patient On (Oxygen Delivery Method): nasal cannula  O2 Flow (L/min): 4        REVIEW OF SYSTEMS:  All other review of systems is negative unless indicated above.      PHYSICAL EXAM:  General: Well developed; frail elderly female, agitated   Eyes:  EOMI; conjunctiva and sclera clear  Head: Normocephalic; atraumatic  ENMT: No nasal discharge; airway clear  Neck: Supple; non tender; no masses  Respiratory: Decreased BS,  No rhonchi, No wheezes  Cardiovascular: Regular rate and rhythm. S1 and S2 Normal; + murmur  Gastrointestinal: Soft non-tender non-distended; Normal bowel sounds  Genitourinary: No  suprapubic  tenderness  Extremities: No edema  Vascular: Peripheral pulses palpable 2+ bilaterally  Neurological: Alert and oriented x 2, mildly confused,   speech is clear, No facial asymmetry, no pronator drift   Skin: Warm and dry. No acute rash  Musculoskeletal: Normal muscle tone, without deformities          LABS:                         14.8   10.30 )-----------( 367      ( 2022 10:44 )             44.1         140  |  109<H>  |  27<H>  ----------------------------<  182<H>  3.3<L>   |  22  |  1.08    Ca    8.8      2022 10:44    TPro  7.4  /  Alb  2.9<L>  /  TBili  0.5  /  DBili  0.1  /  AST  36  /  ALT  47  /  AlkPhos  228<H>    LIVER FUNCTIONS - ( 2022 10:44 )  Alb: 2.9 g/dL / Pro: 7.4 gm/dL / ALK PHOS: 228 U/L / ALT: 47 U/L / AST: 36 U/L / GGT: x           PT/INR - ( 2022 10:44 )   PT: 12.6 sec;   INR: 1.09 ratio        ABG - ( 2022 13:09 )  pH, Arterial: 7.45  pH, Blood: x     /  pCO2: 27    /  pO2: 66    / HCO3: 19    / Base Excess: -3.8  /  SaO2: 95                                    12.8   7.20  )-----------( 272      ( 2022 07:46 )             37.7         x   |  x   |  x   ----------------------------<  x   x    |  x   |  0.76    Ca    8.2<L>      2022 07:46    TPro  6.9  /  Alb  2.8<L>  /  TBili  0.5  /  DBili  0.2  /  AST  26  /  ALT  35  /  AlkPhos  182<H>      LIVER FUNCTIONS - ( 2022 06:35 )  Alb: 2.8 g/dL / Pro: 6.9 gm/dL / ALK PHOS: 182 U/L / ALT: 35 U/L / AST: 26 U/L / GGT: x           PT/INR - ( 2022 06:35 )   PT: 13.7 sec;   INR: 1.18 ratio        ABG - ( 2022 11:55 )  pH, Arterial: 7.50  pH, Blood: x     /  pCO2: 20    /  pO2: 66    / HCO3: 16    / Base Excess: -5.3  /  SaO2: 95                                12.4   4.76  )-----------( 173      ( 2022 08:15 )             36.9     11-15    x   |  x   |  x   ----------------------------<  x   x    |  x   |  0.84    Ca    8.5      2022 08:15    TPro  6.8  /  Alb  2.9<L>  /  TBili  0.5  /  DBili  0.1  /  AST  26  /  ALT  22  /  AlkPhos  109  11-15    LIVER FUNCTIONS - ( 15 Nov 2022 07:26 )  Alb: 2.9 g/dL / Pro: 6.8 gm/dL / ALK PHOS: 109 U/L / ALT: 22 U/L / AST: 26 U/L / GGT: x         PT/INR - ( 15 Nov 2022 07:26 )   PT: 13.8 sec;   INR: 1.19 ratio                              12.4   4.76  )-----------( 173      ( 2022 08:15 )             36.9     11-14    138  |  109<H>  |  22  ----------------------------<  128<H>  3.8   |  22  |  1.04    Ca    8.5      2022 08:15    TPro  6.5  /  Alb  2.8<L>  /  TBili  0.4  /  DBili  0.1  /  AST  32  /  ALT  20  /  AlkPhos  100  1114  LIVER FUNCTIONS - ( 2022 08:15 )  Alb: 2.8 g/dL / Pro: 6.5 gm/dL / ALK PHOS: 100 U/L / ALT: 20 U/L / AST: 32 U/L / GGT: x           PT/INR - ( 2022 08:15 )   PT: 13.9 sec;   INR: 1.20 ratio                                13.3   5.58  )-----------( 191      ( 2022 08:56 )             40.4     11-13    139  |  108  |  25<H>  ----------------------------<  147<H>  3.3<L>   |  20<L>  |  0.92    Ca    9.0      2022 08:56    TPro  7.5  /  Alb  3.3  /  TBili  0.5  /  DBili  0.2  /  AST  33  /  ALT  22  /  AlkPhos  113          LIVER FUNCTIONS - ( 2022 08:56 )  Alb: 3.3 g/dL / Pro: 7.5 gm/dL / ALK PHOS: 113 U/L / ALT: 22 U/L / AST: 33 U/L / GGT: x           PT/INR - ( 2022 08:56 )   PT: 13.2 sec;   INR: 1.14 ratio                              12.4   3.68  )-----------( 141      ( 2022 08:49 )             36.3     2022 08:49    141    |  112    |  24     ----------------------------<  123    3.6     |  22     |  0.83     Ca    8.6        2022 08:49  Mg     2.0       2022 09:51    TPro  6.4    /  Alb  2.9    /  TBili  0.3    /  DBili  0.1    /  AST  31     /  ALT  22     /  AlkPhos  97     2022 08:49    PT/INR - ( 2022 08:49 )   PT: 11.9 sec;   INR: 1.03 ratio        LIVER FUNCTIONS - ( 2022 08:49 )  Alb: 2.9 g/dL / Pro: 6.4 gm/dL / ALK PHOS: 97 U/L / ALT: 22 U/L / AST: 31 U/L / GGT: x           Urinalysis Basic - ( 2022 12:00 )    Color: Yellow / Appearance: Clear / S.015 / pH: x  Gluc: x / Ketone: Small  / Bili: Negative / Urobili: 4   Blood: x / Protein: Trace mg/dL / Nitrite: Positive   Leuk Esterase: Trace / RBC: 0-2 /HPF / WBC 6-10   Sq Epi: x / Non Sq Epi: Occasional / Bacteria: Many    Culture - Urine in AM (22 @ 13:20)   Specimen Source: Clean Catch Clean Catch (Midstream)   Culture Results:   >100,000 CFU/ml Escherichia coli     MEDICATIONS  (STANDING):  cefTRIAXone Injectable.      cefTRIAXone Injectable. 1000 milliGRAM(s) IV Push once  dexAMETHasone     Tablet 6 milliGRAM(s) Oral daily  enoxaparin Injectable 40 milliGRAM(s) SubCutaneous every 24 hours  guaiFENesin ER 1200 milliGRAM(s) Oral every 12 hours  QUEtiapine 25 milliGRAM(s) Oral at bedtime  remdesivir  IVPB 100 milliGRAM(s) IV Intermittent every 24 hours  remdesivir  IVPB   IV Intermittent     MEDICATIONS  (PRN):  acetaminophen     Tablet .. 650 milliGRAM(s) Oral every 6 hours PRN Temp greater or equal to 38C (100.4F), Mild Pain (1 - 3)  albuterol    90 MICROgram(s) HFA Inhaler 2 Puff(s) Inhalation every 6 hours PRN Shortness of Breath and/or Wheezing  aluminum hydroxide/magnesium hydroxide/simethicone Suspension 30 milliLiter(s) Oral every 4 hours PRN Dyspepsia  haloperidol    Injectable 1 milliGRAM(s) IntraMuscular every 6 hours PRN agittion  melatonin 3 milliGRAM(s) Oral at bedtime PRN Insomnia  ondansetron Injectable 4 milliGRAM(s) IV Push every 8 hours PRN Nausea and/or Vomiting    RADIOLOGY & ADDITIONAL TESTS:    ACC: [id number] EXAM:  XR CHEST PORTABLE URGENT 1V                          PROCEDURE DATE:  2022          INTERPRETATION:  AP erect chest on 2022 at 9:36 AM. Patient   has Covid.    Heart magnified by technique.    Mild right lower thoracic curve again noted.    Present film shows a few small scattered infiltrates consistent with   Covid. Infiltrates are new since 2015.    IMPRESSION: Small scattered infiltrates.

## 2022-11-24 VITALS — HEART RATE: 85 BPM | TEMPERATURE: 97 F | SYSTOLIC BLOOD PRESSURE: 138 MMHG | DIASTOLIC BLOOD PRESSURE: 85 MMHG

## 2022-11-24 LAB
ANION GAP SERPL CALC-SCNC: 7 MMOL/L — SIGNIFICANT CHANGE UP (ref 5–17)
BUN SERPL-MCNC: 24 MG/DL — HIGH (ref 7–23)
CALCIUM SERPL-MCNC: 9 MG/DL — SIGNIFICANT CHANGE UP (ref 8.5–10.1)
CHLORIDE SERPL-SCNC: 110 MMOL/L — HIGH (ref 96–108)
CO2 SERPL-SCNC: 19 MMOL/L — LOW (ref 22–31)
CREAT SERPL-MCNC: 0.97 MG/DL — SIGNIFICANT CHANGE UP (ref 0.5–1.3)
CRP SERPL-MCNC: 22 MG/L — HIGH
D DIMER BLD IA.RAPID-MCNC: 2099 NG/ML DDU — HIGH
EGFR: 58 ML/MIN/1.73M2 — LOW
GLUCOSE SERPL-MCNC: 269 MG/DL — HIGH (ref 70–99)
HCT VFR BLD CALC: 41 % — SIGNIFICANT CHANGE UP (ref 34.5–45)
HGB BLD-MCNC: 14.2 G/DL — SIGNIFICANT CHANGE UP (ref 11.5–15.5)
INR BLD: 0.98 RATIO — SIGNIFICANT CHANGE UP (ref 0.88–1.16)
MCHC RBC-ENTMCNC: 28.1 PG — SIGNIFICANT CHANGE UP (ref 27–34)
MCHC RBC-ENTMCNC: 34.6 GM/DL — SIGNIFICANT CHANGE UP (ref 32–36)
MCV RBC AUTO: 81.2 FL — SIGNIFICANT CHANGE UP (ref 80–100)
PLATELET # BLD AUTO: 371 K/UL — SIGNIFICANT CHANGE UP (ref 150–400)
POTASSIUM SERPL-MCNC: 4.4 MMOL/L — SIGNIFICANT CHANGE UP (ref 3.5–5.3)
POTASSIUM SERPL-SCNC: 4.4 MMOL/L — SIGNIFICANT CHANGE UP (ref 3.5–5.3)
PROTHROM AB SERPL-ACNC: 11.4 SEC — SIGNIFICANT CHANGE UP (ref 10.5–13.4)
RBC # BLD: 5.05 M/UL — SIGNIFICANT CHANGE UP (ref 3.8–5.2)
RBC # FLD: 14 % — SIGNIFICANT CHANGE UP (ref 10.3–14.5)
SODIUM SERPL-SCNC: 136 MMOL/L — SIGNIFICANT CHANGE UP (ref 135–145)
WBC # BLD: 12.42 K/UL — HIGH (ref 3.8–10.5)
WBC # FLD AUTO: 12.42 K/UL — HIGH (ref 3.8–10.5)

## 2022-11-24 PROCEDURE — 99239 HOSP IP/OBS DSCHRG MGMT >30: CPT

## 2022-11-24 PROCEDURE — 71045 X-RAY EXAM CHEST 1 VIEW: CPT | Mod: 26

## 2022-11-24 RX ORDER — ALBUTEROL 90 UG/1
2 AEROSOL, METERED ORAL
Qty: 1 | Refills: 0
Start: 2022-11-24 | End: 2022-12-23

## 2022-11-24 RX ORDER — RIVAROXABAN 15 MG-20MG
15 KIT ORAL
Refills: 0 | Status: DISCONTINUED | OUTPATIENT
Start: 2022-11-24 | End: 2022-11-24

## 2022-11-24 RX ORDER — QUETIAPINE FUMARATE 200 MG/1
1 TABLET, FILM COATED ORAL
Qty: 10 | Refills: 0
Start: 2022-11-24 | End: 2022-12-03

## 2022-11-24 RX ADMIN — Medication 1200 MILLIGRAM(S): at 09:01

## 2022-11-24 RX ADMIN — Medication 40 MILLIGRAM(S): at 09:01

## 2022-11-24 RX ADMIN — ENOXAPARIN SODIUM 50 MILLIGRAM(S): 100 INJECTION SUBCUTANEOUS at 09:01

## 2022-11-24 NOTE — PROGRESS NOTE ADULT - ASSESSMENT
1) Hypoxemia  2) COVID Pneumonia  3) Dyspnea  4) Elevated Ddimer    81 y/o female with no PMHx  presents to the ED for increased generalized weakness. Pt was diagnosed with COVID  ago at [facility] and came today after experiencing increased generalized weakness since leaving ED. Pt also reports that she is not eating or drinking well as well as a few episodes of NBNB vomiting.   No abdominal pain, fever, SOB, CP. Not vaccinated for COVID. Pt is [place] speaking but translation provided by daughter.  Pt hypoxic in to 90%, abnormal CXR, started on Dexa and Remdesivir   Ddimer noted to be 504, age appropriate >800   Ambulatory desaturations to 78% were noted on 11/15  Vaccination status is unclear but patient states that she was not vaccinated   Currently on Remdesivir and Decadron  CTPE showed no PE but evidence of patchy opacities  Now receiving Lovenox 50mg q 12 hrs , ddimer trend noted but clinically, she is improving and requiring 4-6L NC O2  Reviewed Echocardiogram, no PH noted    11/23  seen and examined  covering for Dr Childs  worried about her worsened infiltrates and resp status overall  appears weak while in bed on O2 nc  lethargic early am but arousable  if any worse may need transfer to ICU setting  overall prognosis remains guarded  cont with steroids /get ABG  will discuss with ID  11/24  seen and examined in her room  awake / alert / smiling and happy about going home  no distress  data reviewed and discussed with Dr RISHI Valentine  will need fu with DR Childs as outpt  walk test for eval hypoxemia on exertion  ABG noted  ABG - ( 23 Nov 2022 13:09 )  pH, Arterial: 7.45  pH, Blood: x     /  pCO2: 27    /  pO2: 66    / HCO3: 19    / Base Excess: -3.8  /  SaO2: 95

## 2022-11-24 NOTE — PROGRESS NOTE ADULT - SUBJECTIVE AND OBJECTIVE BOX
Patient is a 82y old  Female who presents with a chief complaint of covid (15 Nov 2022 11:12)      HPI:  81 y/o female with no PMHx  presents to the ED for increased generalized weakness. Pt was diagnosed with COVID  ago at [facility] and came today after experiencing increased generalized weakness since leaving ED. Pt also reports that she is not eating or drinking well as well as a few episodes of NBNB vomiting. No abdominal pain, fever, SOB, CP. Not vaccinated for COVID. Pt is Irish speaking but translation provided by daughter.  Pt hypoxic in to 90%, abnormal CXR, started on Dexa and Remdesivir adm for further Rx. (11 Nov 2022 16:14)  Ddimer noted to be 504  Ambulatory desaturations to 78% were noted on 11/15  Vaccination status is unclear     11/22  No acute pulmonary events occurred overnight  Ddimer has significantly increased from 500-->1157-->2300-->2500 despite being on Lovenox 50q12  11/23  seen and examined  covering for Dr Childs  worried about her worsened infiltrates and resp status overall  appears weak while in bed on O2 nc  lethargic early am but arousable  11/24  seen and examined in her room  awake / alert / smiling and happy about going home  no distress  data reviewed and discussed with Dr RISHI Valentine  will need fu with DR Childs as outpt  walk test for eval hypoxemia on exertion  ABG noted  ABG - ( 23 Nov 2022 13:09 )  pH, Arterial: 7.45  pH, Blood: x     /  pCO2: 27    /  pO2: 66    / HCO3: 19    / Base Excess: -3.8  /  SaO2: 95                PAST MEDICAL & SURGICAL HISTORY:  No pertinent past medical history      No significant past surgical history    MEDICATIONS  (STANDING):  enoxaparin Injectable 50 milliGRAM(s) SubCutaneous every 12 hours  guaiFENesin ER 1200 milliGRAM(s) Oral every 12 hours  methylPREDNISolone sodium succinate Injectable 40 milliGRAM(s) IV Push every 12 hours  QUEtiapine 25 milliGRAM(s) Oral at bedtime    acetaminophen     Tablet .. 650 milliGRAM(s) Oral every 6 hours PRN Temp greater or equal to 38C (100.4F), Mild Pain (1 - 3)  albuterol    90 MICROgram(s) HFA Inhaler 2 Puff(s) Inhalation every 6 hours PRN Shortness of Breath and/or Wheezing  aluminum hydroxide/magnesium hydroxide/simethicone Suspension 30 milliLiter(s) Oral every 4 hours PRN Dyspepsia  melatonin 3 milliGRAM(s) Oral at bedtime PRN Insomnia  ondansetron Injectable 4 milliGRAM(s) IV Push every 8 hours PRN Nausea and/or Vomiting      Vital Signs Last 24 Hrs  T(C): 36.2 (24 Nov 2022 09:10), Max: 36.8 (23 Nov 2022 23:42)  T(F): 97.2 (24 Nov 2022 09:10), Max: 98.3 (23 Nov 2022 23:42)  HR: 85 (24 Nov 2022 09:10) (70 - 90)  BP: 138/85 (24 Nov 2022 09:10) (120/83 - 138/85)  BP(mean): 95 (23 Nov 2022 23:42) (95 - 95)  RR: 18 (23 Nov 2022 23:42) (18 - 18)  SpO2: 94% (23 Nov 2022 23:42) (94% - 98%)    Parameters below as of 23 Nov 2022 23:42  Patient On (Oxygen Delivery Method): nasal cannula  O2 Flow (L/min): 4    I&O's Summary    PHYSICAL EXAM  General Appearance: cooperative, no acute distress,   HEENT: PERRL, conjunctiva clear, EOM's intact, non injected pharynx, no exudate, TM   normal  Neck: Supple, , no adenopathy, thyroid: not enlarged, no carotid bruit or JVD  Back: Symmetric, no  tenderness,no soft tissue tenderness  Lungs: diminished mikala, bibasilar rhonchi, appears improved on exam  Heart: Regular rate and rhythm, S1, S2 normal, no murmur, rub or gallop  Abdomen: Soft, non-tender, bowel sounds active , no hepatosplenomegaly  Extremities: no cyanosis or edema, no joint swelling  Skin: Skin color, texture normal, no rashes   Neurologic: Alert and oriented X3 , cranial nerves intact, sensory and motor normal,    ECG:    LABS:                          12.4   4.76  )-----------( 173      ( 14 Nov 2022 08:15 )             36.9     11-15    x   |  x   |  x   ----------------------------<  x   x    |  x   |  0.84    Ca    8.5      14 Nov 2022 08:15    TPro  6.8  /  Alb  2.9<L>  /  TBili  0.5  /  DBili  0.1  /  AST  26  /  ALT  22  /  AlkPhos  109  11-15          Pro BNP  -- 11-15 @ 22:06  D Dimer  504 11-15 @ 22:06  Pro BNP  -- 11-13 @ 08:56  D Dimer  336 11-13 @ 08:56    PT/INR - ( 15 Nov 2022 07:26 )   PT: 13.8 sec;   INR: 1.19 ratio                   RADIOLOGY & ADDITIONAL STUDIES:    < from: CT Angio Chest PE Protocol w/ IV Cont (11.17.22 @ 11:12) >  No enlarged axillary, mediastinal or hilar lymph nodes.    No pericardial or pleural effusions. Vascular calcifications with   involvement of the aorta and the coronary arteries. Intimal calcified and   noncalcified plaques involving the aorta.    Evaluation of the upper abdomen demonstrate partially imaged   indeterminate left renal nodule, within the mid to lower pole, part of   which measures about 1.1 cm.    Evaluation of lungs demonstrate numerous bilateral patchy and nodular   opacities throughout the mid to lower lungs, right greater than left.   Many of the opacities have a peripheral distribution.    No central endobronchial lesions.    Degenerative changes of the spine and the shoulders. Spinal scoliosis.    IMPRESSION: Limited pulmonary embolism study for evaluation of some of   the subsegmental or smaller pulmonary arterial branches due to   respiratory motion artifact. No pulmonary arterial emboli within the well   visualized pulmonary arteries.    Widespread bilateral nodular and patchy opacities as described above   representing pneumonia. Considerations includeCovid 19 given the   distribution.    1-3 month follow-up noncontrast chest CT is recommended to ensure   resolution.    1.1 cm indeterminate partially imaged left renal lesion. Renal ultrasound   can be performed for further evaluation.    < end of copied text >

## 2022-11-24 NOTE — DISCHARGE NOTE PROVIDER - NSDCCPCAREPLAN_GEN_ALL_CORE_FT
PRINCIPAL DISCHARGE DIAGNOSIS  Diagnosis: Pneumonia due to COVID-19 virus  Assessment and Plan of Treatment: C/w prednisone   C/w Xarelto   Albuterol as needed   F/u with Dr Childs in 2 weeks      SECONDARY DISCHARGE DIAGNOSES  Diagnosis: Abnormal thyroid stimulating hormone (TSH) level  Assessment and Plan of Treatment: F/u with PCP to repeat labs in 4 weeks    Diagnosis: Aortic stenosis  Assessment and Plan of Treatment: mild to moderate  F/u with PCP or  Cardio for monitoring    Diagnosis: Cognitive impairment  Assessment and Plan of Treatment: possibly due to acute illness, but consider to f/u with neurologist for evaluation when fully recovered from COVID

## 2022-11-24 NOTE — DISCHARGE NOTE PROVIDER - NSDCMRMEDTOKEN_GEN_ALL_CORE_FT
albuterol 90 mcg/inh inhalation aerosol: 2 puff(s) inhaled every 6 hours, As needed, Shortness of Breath and/or Wheezing  predniSONE 10 mg oral tablet: 5 tab(s) PO QD x 3 days, 4 tab(s) PO QD x 3 days, 3 tab(s) PO  QD x 3 days, 2 tab(s) PO  QD x 3 days, 1 tab(s) PO  QD x 3  days  QUEtiapine 25 mg oral tablet: 1 tab(s) orally once a day (at bedtime), As Needed for confusion or agitation   Xarelto Starter Pack 15 mg-20 mg oral kit: 1 tab(s) orally 2 times a day

## 2022-11-24 NOTE — DISCHARGE NOTE PROVIDER - HOSPITAL COURSE
83 y/o female with no PMHx  presented  to the ED for increased generalized weakness. Pt was diagnosed with COVID 1 week Prior to admission  at [facility] and came back  after experiencing increased generalized weakness since leaving ED. Pt also reported  that she is not eating or drinking well as well as a few episodes of NBNB vomiting. No abdominal pain, fever, SOB, CP. Not vaccinated for COVID. .  In ED pt was noted to be hypoxic in to 90%, abnormal CXR, was started on Dexa and Remdesivir. Pt had complicated hospital course with worsening hypoxia and CXR, now improved. Pt completed 10 days  or remdesivir and Dexa. Pts  D-dimers trended up, CTA and LE doppler neg for DVT. Pt was managed with Full dose A/c with lovenox, now switched to Xarelro, plan to f/u with Pulm for further  management of A/c. Pt also was on  Solumedrol 40 mg BID due to persistent hypoxia and worsening repeat XR. Now respiratory status improved, Tolerates RA. Pt ambulated in the room few times, pulse ox above 90s. Hospital course also significant for encephalopathy in settings of acute COVID, hypoxia and also found to have UTI, TX with Ceftriaxone x 5 days. Now Mental status improved and close to baseline, suspect  baseline mild cognitive impairment. D/w daughter will follow outPt with neuro for eval when Pt recovers. Now Pts oral intake improved   Today Pt reports that feels well, no SOB, no much cough, ambulated in the room, wants to go home, outPt f/u discussed       Vital Signs Last 24 Hrs  T(C): 36.2 (24 Nov 2022 09:10), Max: 36.8 (23 Nov 2022 23:42)  T(F): 97.2 (24 Nov 2022 09:10), Max: 98.3 (23 Nov 2022 23:42)  HR: 85 (24 Nov 2022 09:10) (70 - 90)  BP: 138/85 (24 Nov 2022 09:10) (120/83 - 138/85)  BP(mean): 95 (23 Nov 2022 23:42) (95 - 95)  RR: 18 (23 Nov 2022 23:42) (18 - 18)  SpO2: 94% (23 Nov 2022 23:42) (94% - 98%)    Parameters below as of 23 Nov 2022 23:42  Patient On (Oxygen Delivery Method): nasal cannula  O2 Flow (L/min): 4      PHYSICAL EXAM:  General: Well developed; frail elderly female, agitated   Eyes:  EOMI; conjunctiva and sclera clear  Head: Normocephalic; atraumatic  ENMT: No nasal discharge; airway clear  Neck: Supple; non tender; no masses  Respiratory: Decreased BS,  No rhonchi, No wheezes  Cardiovascular: Regular rate and rhythm. S1 and S2 Normal; + murmur  Gastrointestinal: Soft non-tender non-distended; Normal bowel sounds  Genitourinary: No  suprapubic  tenderness  Extremities: No edema  Vascular: Peripheral pulses palpable 2+ bilaterally  Neurological: Alert and oriented x 2, mildly confused,   speech is clear, No facial asymmetry, no pronator drift   Skin: Warm and dry. No acute rash  Musculoskeletal: Normal muscle tone, without deformities    A/P:   *Acute hypoxic respiratory failure secondary to COVID19 multifocal PNA, overall improved. Elevated D-Dimer   tolerates RA at rest and exertion   Completed 10 days course of   Dexamethasone and Remdesivir  On  Solumedrol 40mg BID, will switch to PO prednsione taper    On Mucinex and Albuterol PRN   D-dimers elevated, LE doppler and CTA neg for VTE  C/w Tx dose A/c, changed to Xarelto   ABG reviewed   D/w Dr Zepeda, Pt to f.u with Dr Mejia outPt     * Agitation Confusion, likely due to metabolic encephalopathy and delirium  2/2 infection and dehydration, improved    supportive care   Seroquel QHS as needed       * Nausea and Vomiting, resolved  oral intake is better   possibly related to gastritis vs GERD  C/w Pantoprazole  Zofran PRN      * ECOLI UTI  UCX:  + >100K   Completed 5 days IV  ceftriaxone     * Systolic ejection murmur  ECHO: Mild to mod AS, EF 60-65%  will need outPt f/u with cardio       * Sinus Bradycardia, resolved   TSH  elevated, mildly low Total  T3, but T4 wnl  will need to repeat TFts in few weeks when improves         Dispo; stable for d/c home today with HC  Fax d/c summary to PCP  Total time 46 min

## 2022-11-24 NOTE — DISCHARGE NOTE PROVIDER - PROVIDER TOKENS
PROVIDER:[TOKEN:[[id number]],FOLLOWUP:[2 weeks]],PROVIDER:[TOKEN:[[id number]],FOLLOWUP:[1 week]]

## 2022-11-24 NOTE — DISCHARGE NOTE PROVIDER - CARE PROVIDER_API CALL
Carlos Childs  INTERNAL MEDICINE  [address] [place] [address]  Phone: [phone]  Fax: [phone]  Follow Up Time: 2 weeks    Shane Vila)  Family Medicine  [address] [place] [address]  Phone: [phone]  Fax: [phone]  Follow Up Time: 1 week

## 2022-11-24 NOTE — PROGRESS NOTE ADULT - REASON FOR ADMISSION
covid

## 2022-11-24 NOTE — PROGRESS NOTE ADULT - PROVIDER SPECIALTY LIST ADULT
Hospitalist
Pulmonology
Pulmonology
Hospitalist
Infectious Disease
Hospitalist
Infectious Disease
Pulmonology
Critical Care
Hospitalist
Hospitalist
Pulmonology

## 2022-11-24 NOTE — CHART NOTE - NSCHARTNOTEFT_GEN_A_CORE
requested repeat Home O2 eval. Found patient sitting in chair on RA SpO2 92%. Pt walks without assistance. Encouraged pt to walk throughout the room to double check O2 sat on ambulation. Pt walked back and forth 5 times in the room and SpO2 ranged from 92-95%. Pt does not qualify for oxygen on ambulation. RN encouraged pt to continue walking about the room, pt SpO2 ranged from 90-97% after 10 minute continuous ambulation.
Pt was combative and forcefully trying to escape the hospital. Code grey called and then code OSMANI called. Pt was given 5 mg of Haldol IM and 2mg of Ativan IV push. Daughter came by and urged the daughter to talk to the pt to calm her down. Per daughter, pt "wants to leave the hospital b/c she doesn't believe in modern medicine"
HOME O2 EVALUATION    Pulse Ox Room Air Rest:92%    Pulse Ox Room Air Ambulatin%    Pulse Ox on O2      4    L Ambulatin    Pulse Ox Post Ambulation: 93% 4lpm rest
RAPID RESPONSE HOUSE TEAM NOTIFICATION    Brief HPI: Patient is an 83 yo female with no known pmh who presented to [facility] ED on 11/11/22 for generalized weakness. Patient had been diagnosed with covid-19 x1 week prior. Patient had stated she had not been eating or drinking and experiencing n/v at home. Patient was admitted to medicine service where she was treated with remdisivir and decadron for hypoxia. Yesterday patient was noted to have elevated d-dimer, medical team was recommending CTA chest, though patient refusing at that time; patient was also noted to be with increased agitation and confusion. Patient was started on full dose AC with lovenox.  This morning patient was more somnolent, though able to answer questions and agreed to CTA.    Approx 1031 am, RRT called as patient was with increased somnolence no longer answering questions. Upon arrival, bedside nursing staff, RT and hospitalist in room. Patient with improved mental status during RRT, answering questions appropriately and moving all ext. ICU not needed at this time, hospitalist in agreement. See recommendations below.     ROS:  Limited ROS d/t AMS     x      |  x      |  x      ----------------------------<  x          ( 17 Nov 2022 07:28 )  x       |  x      |  0.80       TPro  6.9    /  Alb  2.8    /  TBili  0.5    /  DBili  0.1    /  AST  19     /  ALT  24     /  AlkPhos  117    ( 17 Nov 2022 07:28 )    LIVER FUNCTIONS - ( 17 Nov 2022 07:28 )  Alb: 2.8 g/dL / Pro: 6.9 gm/dL / ALK PHOS: 117 U/L / ALT: 24 U/L / AST: 19 U/L / GGT: x           PT/INR -  14.4 sec / 1.24 ratio   ( 17 Nov 2022 07:28 )         CAPILLARY BLOOD GLUCOSE    ICU Vital Signs Last 24 Hrs  T(C): 36.7 (17 Nov 2022 07:42), Max: 36.7 (16 Nov 2022 16:30)  T(F): 98.1 (17 Nov 2022 07:42), Max: 98.1 (17 Nov 2022 07:42)  HR: 59 (17 Nov 2022 07:42) (50 - 59)  BP: 144/64 (17 Nov 2022 07:42) (124/62 - 166/71)  BP(mean): --  ABP: --  ABP(mean): --  RR: 17 (17 Nov 2022 07:42) (17 - 18)  SpO2: 88% (17 Nov 2022 07:42) (83% - 97%)    O2 Parameters below as of 17 Nov 2022 07:42  Patient On (Oxygen Delivery Method): nasal cannula  O2 Flow (L/min): 4      PE:  NEURO: Somnolent on arrival, though became alert, following commands, and speaking. Moving all ext  CV: + S1, S2. RRR, no murmur  RESP: Diminished breath sounds in b/l lower bases. Chest expansion symmetrical. No cough  GI: Soft, non tender, non distended.  Ext: no edema, palpable pulses    VSS, 's, HR 60's, Spo2 93%    Problem:  - New AMS   - Acute hypoxic resp failure  - Covid-19    Recommendation:   - Obtain head CT given change in mental status; was recently started on full AC   - Obtain ABG to assess for hypercapnia  - Assess BMP / Na   - Consider neurology consult  - Started on Seroquel and haldol in hospital; would hold at this time  - Assess blood glucose     Please call ICU back if need arises or patient decompensates    PLEASE NOTE TREATMENT IN REGARD TO RRT EVENTS ONLY, FURTHER MEDICAL DECISIONS AND TREATMENT PER PRIMARY TEAM.

## 2022-11-24 NOTE — DISCHARGE NOTE NURSING/CASE MANAGEMENT/SOCIAL WORK - PATIENT PORTAL LINK FT
You can access the FollowMyHealth Patient Portal offered by [facility] by registering at the following website: [email or web address]. By joining [employer]’s FollowMyHealth portal, you will also be able to view your health information using other applications (apps) compatible with our system. 81

## 2022-11-26 ENCOUNTER — INPATIENT (INPATIENT)
Facility: HOSPITAL | Age: 82
LOS: 15 days | Discharge: HOME | DRG: 871 | End: 2022-12-12
Attending: HOSPITALIST | Admitting: FAMILY MEDICINE
Payer: MEDICARE

## 2022-11-26 VITALS
RESPIRATION RATE: 18 BRPM | HEART RATE: 83 BPM | SYSTOLIC BLOOD PRESSURE: 143 MMHG | DIASTOLIC BLOOD PRESSURE: 62 MMHG | WEIGHT: 139.99 LBS | OXYGEN SATURATION: 94 % | HEIGHT: 65 IN | TEMPERATURE: 98 F

## 2022-11-26 DIAGNOSIS — R41.89 OTHER SYMPTOMS AND SIGNS INVOLVING COGNITIVE FUNCTIONS AND AWARENESS: ICD-10-CM

## 2022-11-26 LAB
A1C WITH ESTIMATED AVERAGE GLUCOSE RESULT: 7.9 % — HIGH (ref 4–5.6)
ADD ON TEST-SPECIMEN IN LAB: SIGNIFICANT CHANGE UP
ADD ON TEST-SPECIMEN IN LAB: SIGNIFICANT CHANGE UP
ALBUMIN SERPL ELPH-MCNC: 2.9 G/DL — LOW (ref 3.3–5)
ALP SERPL-CCNC: 209 U/L — HIGH (ref 40–120)
ALT FLD-CCNC: 49 U/L — SIGNIFICANT CHANGE UP (ref 12–78)
ANION GAP SERPL CALC-SCNC: 9 MMOL/L — SIGNIFICANT CHANGE UP (ref 5–17)
APPEARANCE UR: CLEAR — SIGNIFICANT CHANGE UP
AST SERPL-CCNC: 31 U/L — SIGNIFICANT CHANGE UP (ref 15–37)
B-OH-BUTYR SERPL-SCNC: 0.2 MMOL/L — SIGNIFICANT CHANGE UP
BASE EXCESS BLDV CALC-SCNC: -5.3 MMOL/L — SIGNIFICANT CHANGE UP
BASOPHILS # BLD AUTO: 0.01 K/UL — SIGNIFICANT CHANGE UP (ref 0–0.2)
BASOPHILS NFR BLD AUTO: 0.1 % — SIGNIFICANT CHANGE UP (ref 0–2)
BILIRUB SERPL-MCNC: 0.5 MG/DL — SIGNIFICANT CHANGE UP (ref 0.2–1.2)
BILIRUB UR-MCNC: NEGATIVE — SIGNIFICANT CHANGE UP
BUN SERPL-MCNC: 24 MG/DL — HIGH (ref 7–23)
CALCIUM SERPL-MCNC: 8.6 MG/DL — SIGNIFICANT CHANGE UP (ref 8.5–10.1)
CHLORIDE SERPL-SCNC: 104 MMOL/L — SIGNIFICANT CHANGE UP (ref 96–108)
CO2 BLDV-SCNC: 20 MMOL/L — LOW (ref 22–26)
CO2 SERPL-SCNC: 21 MMOL/L — LOW (ref 22–31)
COLOR SPEC: YELLOW — SIGNIFICANT CHANGE UP
CREAT SERPL-MCNC: 1.23 MG/DL — SIGNIFICANT CHANGE UP (ref 0.5–1.3)
DIFF PNL FLD: ABNORMAL
EGFR: 44 ML/MIN/1.73M2 — LOW
EOSINOPHIL # BLD AUTO: 0 K/UL — SIGNIFICANT CHANGE UP (ref 0–0.5)
EOSINOPHIL NFR BLD AUTO: 0 % — SIGNIFICANT CHANGE UP (ref 0–6)
ESTIMATED AVERAGE GLUCOSE: 180 MG/DL — HIGH (ref 68–114)
ETHANOL SERPL-MCNC: <10 MG/DL — SIGNIFICANT CHANGE UP (ref 0–10)
GAS PNL BLDV: SIGNIFICANT CHANGE UP
GLUCOSE BLDC GLUCOMTR-MCNC: 250 MG/DL — HIGH (ref 70–99)
GLUCOSE SERPL-MCNC: 329 MG/DL — HIGH (ref 70–99)
GLUCOSE UR QL: 1000 MG/DL
HCO3 BLDV-SCNC: 19 MMOL/L — LOW (ref 22–29)
HCT VFR BLD CALC: 41.1 % — SIGNIFICANT CHANGE UP (ref 34.5–45)
HGB BLD-MCNC: 14.2 G/DL — SIGNIFICANT CHANGE UP (ref 11.5–15.5)
IMM GRANULOCYTES NFR BLD AUTO: 1 % — HIGH (ref 0–0.9)
KETONES UR-MCNC: NEGATIVE — SIGNIFICANT CHANGE UP
LACTATE SERPL-SCNC: 5 MMOL/L — CRITICAL HIGH (ref 0.7–2)
LEUKOCYTE ESTERASE UR-ACNC: ABNORMAL
LYMPHOCYTES # BLD AUTO: 0.88 K/UL — LOW (ref 1–3.3)
LYMPHOCYTES # BLD AUTO: 7.4 % — LOW (ref 13–44)
MCHC RBC-ENTMCNC: 28.7 PG — SIGNIFICANT CHANGE UP (ref 27–34)
MCHC RBC-ENTMCNC: 34.5 GM/DL — SIGNIFICANT CHANGE UP (ref 32–36)
MCV RBC AUTO: 83.2 FL — SIGNIFICANT CHANGE UP (ref 80–100)
MONOCYTES # BLD AUTO: 0.23 K/UL — SIGNIFICANT CHANGE UP (ref 0–0.9)
MONOCYTES NFR BLD AUTO: 1.9 % — LOW (ref 2–14)
NEUTROPHILS # BLD AUTO: 10.66 K/UL — HIGH (ref 1.8–7.4)
NEUTROPHILS NFR BLD AUTO: 89.6 % — HIGH (ref 43–77)
NITRITE UR-MCNC: NEGATIVE — SIGNIFICANT CHANGE UP
PCO2 BLDV: 33 MMHG — LOW (ref 39–42)
PCP SPEC-MCNC: SIGNIFICANT CHANGE UP
PH BLDV: 7.37 — SIGNIFICANT CHANGE UP (ref 7.32–7.43)
PH UR: 6 — SIGNIFICANT CHANGE UP (ref 5–8)
PLATELET # BLD AUTO: 269 K/UL — SIGNIFICANT CHANGE UP (ref 150–400)
PO2 BLDV: 49 MMHG — SIGNIFICANT CHANGE UP
POTASSIUM SERPL-MCNC: 3.9 MMOL/L — SIGNIFICANT CHANGE UP (ref 3.5–5.3)
POTASSIUM SERPL-SCNC: 3.9 MMOL/L — SIGNIFICANT CHANGE UP (ref 3.5–5.3)
PROT SERPL-MCNC: 6.6 GM/DL — SIGNIFICANT CHANGE UP (ref 6–8.3)
PROT UR-MCNC: 15
RBC # BLD: 4.94 M/UL — SIGNIFICANT CHANGE UP (ref 3.8–5.2)
RBC # FLD: 14.2 % — SIGNIFICANT CHANGE UP (ref 10.3–14.5)
SAO2 % BLDV: 81.7 % — SIGNIFICANT CHANGE UP
SODIUM SERPL-SCNC: 134 MMOL/L — LOW (ref 135–145)
SP GR SPEC: 1.02 — SIGNIFICANT CHANGE UP (ref 1.01–1.02)
TSH SERPL-MCNC: 3.35 UU/ML — SIGNIFICANT CHANGE UP (ref 0.34–4.82)
UROBILINOGEN FLD QL: NEGATIVE — SIGNIFICANT CHANGE UP
WBC # BLD: 11.9 K/UL — HIGH (ref 3.8–10.5)
WBC # FLD AUTO: 11.9 K/UL — HIGH (ref 3.8–10.5)

## 2022-11-26 PROCEDURE — G1004: CPT

## 2022-11-26 PROCEDURE — 99285 EMERGENCY DEPT VISIT HI MDM: CPT

## 2022-11-26 PROCEDURE — 70450 CT HEAD/BRAIN W/O DYE: CPT | Mod: 26,MG

## 2022-11-26 RX ORDER — INSULIN LISPRO 100/ML
VIAL (ML) SUBCUTANEOUS
Refills: 0 | Status: DISCONTINUED | OUTPATIENT
Start: 2022-11-26 | End: 2022-12-12

## 2022-11-26 RX ORDER — DEXTROSE 50 % IN WATER 50 %
15 SYRINGE (ML) INTRAVENOUS ONCE
Refills: 0 | Status: DISCONTINUED | OUTPATIENT
Start: 2022-11-26 | End: 2022-12-12

## 2022-11-26 RX ORDER — CEPHALEXIN 500 MG
500 CAPSULE ORAL ONCE
Refills: 0 | Status: COMPLETED | OUTPATIENT
Start: 2022-11-26 | End: 2022-11-26

## 2022-11-26 RX ORDER — CEFTRIAXONE 500 MG/1
1000 INJECTION, POWDER, FOR SOLUTION INTRAMUSCULAR; INTRAVENOUS ONCE
Refills: 0 | Status: COMPLETED | OUTPATIENT
Start: 2022-11-26 | End: 2022-11-26

## 2022-11-26 RX ORDER — SODIUM CHLORIDE 9 MG/ML
1000 INJECTION, SOLUTION INTRAVENOUS
Refills: 0 | Status: COMPLETED | OUTPATIENT
Start: 2022-11-26 | End: 2022-11-26

## 2022-11-26 RX ORDER — DEXTROSE 50 % IN WATER 50 %
25 SYRINGE (ML) INTRAVENOUS ONCE
Refills: 0 | Status: DISCONTINUED | OUTPATIENT
Start: 2022-11-26 | End: 2022-12-12

## 2022-11-26 RX ORDER — CEFTRIAXONE 500 MG/1
1000 INJECTION, POWDER, FOR SOLUTION INTRAMUSCULAR; INTRAVENOUS ONCE
Refills: 0 | Status: DISCONTINUED | OUTPATIENT
Start: 2022-11-26 | End: 2022-11-26

## 2022-11-26 RX ORDER — SODIUM CHLORIDE 9 MG/ML
1000 INJECTION INTRAMUSCULAR; INTRAVENOUS; SUBCUTANEOUS ONCE
Refills: 0 | Status: DISCONTINUED | OUTPATIENT
Start: 2022-11-26 | End: 2022-11-26

## 2022-11-26 RX ORDER — SODIUM CHLORIDE 9 MG/ML
2000 INJECTION INTRAMUSCULAR; INTRAVENOUS; SUBCUTANEOUS ONCE
Refills: 0 | Status: COMPLETED | OUTPATIENT
Start: 2022-11-26 | End: 2022-11-26

## 2022-11-26 RX ORDER — SODIUM CHLORIDE 9 MG/ML
1000 INJECTION, SOLUTION INTRAVENOUS
Refills: 0 | Status: DISCONTINUED | OUTPATIENT
Start: 2022-11-26 | End: 2022-12-12

## 2022-11-26 RX ORDER — QUETIAPINE FUMARATE 200 MG/1
25 TABLET, FILM COATED ORAL
Refills: 0 | Status: DISCONTINUED | OUTPATIENT
Start: 2022-11-26 | End: 2022-12-12

## 2022-11-26 RX ORDER — INSULIN LISPRO 100/ML
VIAL (ML) SUBCUTANEOUS AT BEDTIME
Refills: 0 | Status: DISCONTINUED | OUTPATIENT
Start: 2022-11-26 | End: 2022-12-12

## 2022-11-26 RX ORDER — DEXTROSE 50 % IN WATER 50 %
12.5 SYRINGE (ML) INTRAVENOUS ONCE
Refills: 0 | Status: DISCONTINUED | OUTPATIENT
Start: 2022-11-26 | End: 2022-12-12

## 2022-11-26 RX ORDER — GLUCAGON INJECTION, SOLUTION 0.5 MG/.1ML
1 INJECTION, SOLUTION SUBCUTANEOUS ONCE
Refills: 0 | Status: DISCONTINUED | OUTPATIENT
Start: 2022-11-26 | End: 2022-12-12

## 2022-11-26 RX ORDER — CEFTRIAXONE 500 MG/1
1000 INJECTION, POWDER, FOR SOLUTION INTRAMUSCULAR; INTRAVENOUS EVERY 24 HOURS
Refills: 0 | Status: DISCONTINUED | OUTPATIENT
Start: 2022-11-26 | End: 2022-11-27

## 2022-11-26 RX ORDER — HALOPERIDOL DECANOATE 100 MG/ML
1 INJECTION INTRAMUSCULAR EVERY 8 HOURS
Refills: 0 | Status: DISCONTINUED | OUTPATIENT
Start: 2022-11-26 | End: 2022-11-29

## 2022-11-26 RX ADMIN — SODIUM CHLORIDE 65 MILLILITER(S): 9 INJECTION, SOLUTION INTRAVENOUS at 23:33

## 2022-11-26 RX ADMIN — Medication 500 MILLIGRAM(S): at 15:46

## 2022-11-26 RX ADMIN — QUETIAPINE FUMARATE 25 MILLIGRAM(S): 200 TABLET, FILM COATED ORAL at 21:06

## 2022-11-26 RX ADMIN — SODIUM CHLORIDE 1000 MILLILITER(S): 9 INJECTION INTRAMUSCULAR; INTRAVENOUS; SUBCUTANEOUS at 17:59

## 2022-11-26 RX ADMIN — HALOPERIDOL DECANOATE 1 MILLIGRAM(S): 100 INJECTION INTRAMUSCULAR at 23:11

## 2022-11-26 RX ADMIN — CEFTRIAXONE 1000 MILLIGRAM(S): 500 INJECTION, POWDER, FOR SOLUTION INTRAMUSCULAR; INTRAVENOUS at 21:06

## 2022-11-26 NOTE — BH CONSULTATION LIAISON ASSESSMENT NOTE - RISK ASSESSMENT
Risk factors: h/o recent SIB, acute/chronic medical issues, noncompliant with treatment, not receiving treatment, cognitive impairment    Protective factors: no current SIIP/HIIP, no h/o SA no h/o psych admissions, no active substance abuse, no psychosis, engaged in work or school, with adult children and grandchildren, domiciled, social supports    Overall, pt is an acute moderate risk of harm to self/others, ongoing risk to be determined as patient's medically acute which can be contributing to pscyhaitric presentation.  Pscyh CL to determine ongoing risk.

## 2022-11-26 NOTE — ED PROVIDER NOTE - NS ED ROS FT
GENERAL: no fever, no chills, no weight loss  EYES: no change in vision, no irritation, no discharge, no redness, no pain  HEENT: no trouble swallowing or speaking, no throat pain, no ear pain  CARDIAC: no chest pain, no palpitations   PULMONARY: no cough, no shortness of breath, no wheezing  GI: no abdominal pain, no nausea, no vomiting, no diarrhea, no constipation, no melena, no hematochezia, no hematemesis  : no changes in urination, no dysuria, no frequency, no hematuria, no discharge  SKIN: no rashes, +lacerations left forearm  NEURO: no headache, no numbness, no weakness  MSK: no joint pain, no muscle pain, no back pain, no calf pain

## 2022-11-26 NOTE — ED ADULT TRIAGE NOTE - CHIEF COMPLAINT QUOTE
pt presents to ed via ems from home for evaluation of lacerations to left forearm with a clipper- per daughter, she was trying to medicate pt and pt became upset and frustrated and started cutting her left arm from wrist to elbow. pt was recently admitted to - per daughter pt "acts out". pt admitting to cutting arms , not answering the intention. pt on seroquel  per daughter pt presents to ed via ems from home for evaluation of lacerations to left forearm with a clipper- per daughter, she was trying to medicate pt and pt became upset and frustrated and started cutting her left arm from wrist to elbow. pt was recently admitted to - per daughter pt "acts out". pt admitting to cutting arms , not answering the intention. pt on seroquel  per daughter. 1-1 initiated. bleeding controlled PTA

## 2022-11-26 NOTE — PATIENT PROFILE ADULT - FALL HARM RISK - HARM RISK INTERVENTIONS
Assistance with ambulation/Assistance OOB with selected safe patient handling equipment/Communicate Risk of Fall with Harm to all staff/Reinforce activity limits and safety measures with patient and family/Tailored Fall Risk Interventions/Visual Cue: Yellow wristband and red socks/Bed in lowest position, wheels locked, appropriate side rails in place/Call bell, personal items and telephone in reach/Instruct patient to call for assistance before getting out of bed or chair/Non-slip footwear when patient is out of bed/[place] to call system/Physically safe environment - no spills, clutter or unnecessary equipment/Purposeful Proactive Rounding/Room/bathroom lighting operational, light cord in reach Assistance OOB with selected safe patient handling equipment/Communicate Risk of Fall with Harm to all staff/Discuss with provider need for PT consult/Monitor gait and stability/Provide patient with walking aids - walker, cane, crutches/Reinforce activity limits and safety measures with patient and family/Tailored Fall Risk Interventions/Visual Cue: Yellow wristband and red socks/Bed in lowest position, wheels locked, appropriate side rails in place/Call bell, personal items and telephone in reach/Instruct patient to call for assistance before getting out of bed or chair/Non-slip footwear when patient is out of bed/[place] to call system/Physically safe environment - no spills, clutter or unnecessary equipment/Purposeful Proactive Rounding/Room/bathroom lighting operational, light cord in reach

## 2022-11-26 NOTE — H&P ADULT - ASSESSMENT
81 y/o F recent admission to  11/11 - 11/24/2022 for management of acute infectious encephalopathy with hypoxic respiratory failure secondary to COVID19 complicated by multifocal PNA, E. coli UTI was BIBA from home for further evaluation and management of multiple lacerations to left forearm with a nail clipper. As per patient's daughter she was attempting to administer the patient's home medications as prescribed when the patient became upset, frustrated, and started cutting her left arm from her left wrist to her left elbow. In the ED the patient was evaluated by Psychiatry. As noted patient has no formal PPHx, and no history of SA/SIB. Additionally on the patient's prior admission she was noted to have episodes of agitation (requiring prns of Haloperidol and Quetiapine). In the ED the patient was confused awake and alert oriented to person, and place. The patient's daughter reports that the patient has ongoing decline in memory for the last few years and states at times patient leaves the house to go on walks but hasn't gotten lost. The patient's daughter states that since last night and into this morning the patient has been increasingly more agitated, and verbally abusive to her family. 81 y/o F recent admission to  11/11 - 11/24/2022 for management of acute infectious encephalopathy with hypoxic respiratory failure secondary to COVID19 complicated by multifocal PNA, E. coli UTI was BIBA from home for further evaluation and management of multiple lacerations to left forearm with a nail clipper. As per patient's daughter she was attempting to administer the patient's home medications as prescribed when the patient became upset, frustrated, and started cutting her left arm from her left wrist to her left elbow. In the ED the patient was evaluated by Psychiatry. As noted patient has no formal PPHx, and no history of SA/SIB. Additionally on the patient's prior admission she was noted to have episodes of agitation (requiring prns of Haloperidol and Quetiapine). In the ED the patient was confused awake and alert oriented to person, and place. The patient's daughter reports that the patient has ongoing decline in memory for the last few years and states at times patient leaves the house to go on walks but hasn't gotten lost. The patient's daughter states that since last night and into this morning the patient has been increasingly more agitated, and verbally abusive to her family.  Vital signs in triage => /76, HR 71/min, RR 16/min, TMax 98.4'F, and SpO2 96% on room air. Labs => Na 134, BUN//Cr 24/1.23, Glu 329, Alb 2.9, HgbA1C 7.9, (+)UA. CT Head => No evidence of acute intracranial hemorrhage or hydrocephalus. Atrophy with white matter ischemic changes. In the ED the patient was given Cephalexin 500mg PO x 1, Ceftriaxone 1g IVPB x 1, Quetiapine 25mg PO x 1, Haloperidol 1mg PO x 1, and NS x 2L.    #s/p Self Injurious Behaviors/SA resulting in multiple Linear Lacerations to the Left Arm  ~admit to Medicine  ~cont. constant observation  ~Psychiatry consultation greatly appreciated  ~DDx includes Delirium vs Dementia vs MDD  ~cont. Quetiapine 25mg po BID (monitor qtc <500ms on ekg)   ~cont. Haloperidol 1mg PO/IM q8h PRN agitation/severe agitation.  Monitor for QTc<500    ~cont. Melatonin 3mg PO qHS PRN insomnia  ~f/u am labs including TSH, B12, folate, RPR  ~per Psychiatry minimize use of Benzodiazepines opioids, anticholinergics, or other deliriogenic agents when possible.    #UTI (Infectious Encephalopathy?)  ~f/u PAN C+S  ~cont. IV hydration  ~strict I/Os  ~cont. IV abx     #Recent COVID-19  ~cont. Albuterol 90 mcg/inh 2 puffs inhaled every 6 hours, prn  ~cont. Prednisone taper (may be contributing to this patient's presenting symptoms) 10 mg oral tablet: 5 tab(s) PO QD x 1 days, 4 tab(s) PO QD x 3 days, 3 tab(s) PO  QD x 3 days, 2 tab(s) PO  QD x 3 days, 1 tab(s) PO  QD x 3  days  ~cont. Xarelto Starter Pack 15 mg-20 mg oral kit: 1 tab(s) orally 2 times a day     #Vte ppx  ~cont. Xarelto Starter Pack 15 mg-20 mg oral kit: 1 tab(s) orally 2 times a day  83 y/o F recent admission to  11/11 - 11/24/2022 for management of acute infectious encephalopathy with hypoxic respiratory failure secondary to COVID19 complicated by multifocal PNA, E. coli UTI was BIBA from home for further evaluation and management of multiple lacerations to left forearm with a nail clipper. As per patient's daughter she was attempting to administer the patient's home medications as prescribed when the patient became upset, frustrated, and started cutting her left arm from her left wrist to her left elbow. In the ED the patient was evaluated by Psychiatry. As noted patient has no formal PPHx, and no history of SA/SIB. Additionally on the patient's prior admission she was noted to have episodes of agitation (requiring prns of Haloperidol and Quetiapine). In the ED the patient was confused awake and alert oriented to person, and place. The patient's daughter reports that the patient has ongoing decline in memory for the last few years and states at times patient leaves the house to go on walks but hasn't gotten lost. The patient's daughter states that since last night and into this morning the patient has been increasingly more agitated, and verbally abusive to her family.  Vital signs in triage => /76, HR 71/min, RR 16/min, TMax 98.4'F, and SpO2 96% on room air. Labs => Na 134, BUN//Cr 24/1.23, Glu 329, Alb 2.9, HgbA1C 7.9, (+)UA. CT Head => No evidence of acute intracranial hemorrhage or hydrocephalus. Atrophy with white matter ischemic changes. In the ED the patient was given Cephalexin 500mg PO x 1, Ceftriaxone 1g IVPB x 1, Quetiapine 25mg PO x 1, Haloperidol 1mg PO x 1, and NS x 2L.    #s/p Self Injurious Behaviors/SA resulting in multiple Linear Lacerations to the Left Arm  ~admit to Medicine  ~cont. constant observation  ~Psychiatry consultation greatly appreciated  ~DDx includes Delirium vs Dementia vs MDD  ~cont. Quetiapine 25mg po BID (monitor qtc <500ms on ekg)   ~cont. Haloperidol 1mg PO/IM q8h PRN agitation/severe agitation.  Monitor for QTc<500    ~cont. Melatonin 3mg PO qHS PRN insomnia  ~f/u am labs including TSH, B12, folate, RPR  ~per Psychiatry minimize use of Benzodiazepines opioids, anticholinergics, or other deliriogenic agents when possible.    #UTI (Infectious Encephalopathy?)  ~f/u PAN C+S  ~cont. IV hydration  ~strict I/Os  ~cont. IV abx     #Recent COVID-19  ~cont. Albuterol 90 mcg/inh 2 puffs inhaled every 6 hours, prn  ~cont. Prednisone taper (may be contributing to this patient's presenting symptoms) 10 mg oral tablet: 5 tab(s) PO QD x 1 days, 4 tab(s) PO QD x 3 days, 3 tab(s) PO  QD x 3 days, 2 tab(s) PO  QD x 3 days, 1 tab(s) PO  QD x 3  days  ~cont. Xarelto Starter Pack 15 mg-20 mg oral kit: 1 tab(s) orally 2 times a day     #Vte ppx  ~cont. Xarelto Starter Pack 15 mg-20 mg oral kit: 1 tab(s) orally 2 times a day     ****Please note per Dr. Bradley the patient was on Metformin  po daily, Vascepa 1g po daily, Levothyroxine 112mcg po qam, Losartan 50mg po daily, Nebivolol 5mg po daily, and Fenofibrate 160mg po daily. Patient's HgbA1C today was 7.9. As noted on prior admission the patient had no PMHx? although likely has Diabetes mellitus type 2, Hypertension, Hyperlipidemia, and Hypothyroidism. Please confirm further in the am.*** 83 y/o F recent admission to  11/11 - 11/24/2022 for management of acute infectious encephalopathy with hypoxic respiratory failure secondary to COVID19 complicated by multifocal PNA, E. coli UTI was BIBA from home for further evaluation and management of multiple lacerations to left forearm with a nail clipper. As per patient's daughter she was attempting to administer the patient's home medications as prescribed when the patient became upset, frustrated, and started cutting her left arm from her left wrist to her left elbow. In the ED the patient was evaluated by Psychiatry. As noted patient has no formal PPHx, and no history of SA/SIB. Additionally on the patient's prior admission she was noted to have episodes of agitation (requiring prns of Haloperidol and Quetiapine). In the ED the patient was confused awake and alert oriented to person, and place. The patient's daughter reports that the patient has ongoing decline in memory for the last few years and states at times patient leaves the house to go on walks but hasn't gotten lost. The patient's daughter states that since last night and into this morning the patient has been increasingly more agitated, and verbally abusive to her family.  Vital signs in triage => /76, HR 71/min, RR 16/min, TMax 98.4'F, and SpO2 96% on room air. Labs => Na 134, BUN//Cr 24/1.23, Glu 329, Alb 2.9, HgbA1C 7.9, (+)UA. CT Head => No evidence of acute intracranial hemorrhage or hydrocephalus. Atrophy with white matter ischemic changes. In the ED the patient was given Cephalexin 500mg PO x 1, Ceftriaxone 1g IVPB x 1, Quetiapine 25mg PO x 1, Haloperidol 1mg PO x 1, and NS x 2L.    #s/p Self Injurious Behaviors/SA resulting in multiple Linear Lacerations to the Left Arm  ~observe on Medicine  ~cont. constant observation  ~Psychiatry consultation greatly appreciated  ~DDx includes Delirium vs Dementia vs MDD  ~cont. Quetiapine 25mg po BID (monitor qtc <500ms on ekg)   ~cont. Haloperidol 1mg PO/IM q8h PRN agitation/severe agitation.  Monitor for QTc<500    ~cont. Melatonin 3mg PO qHS PRN insomnia  ~f/u am labs including TSH, B12, folate, RPR  ~per Psychiatry minimize use of Benzodiazepines opioids, anticholinergics, or other deliriogenic agents when possible.    #UTI (Infectious Encephalopathy?)  ~f/u PAN C+S  ~cont. IV hydration  ~strict I/Os  ~cont. IV abx     #Recent COVID-19  ~cont. Albuterol 90 mcg/inh 2 puffs inhaled every 6 hours, prn  ~cont. Prednisone taper (may be contributing to this patient's presenting symptoms) 10 mg oral tablet: 5 tab(s) PO QD x 1 days, 4 tab(s) PO QD x 3 days, 3 tab(s) PO  QD x 3 days, 2 tab(s) PO  QD x 3 days, 1 tab(s) PO  QD x 3  days  ~cont. Xarelto Starter Pack 15 mg-20 mg oral kit: 1 tab(s) orally 2 times a day     #Vte ppx  ~cont. Xarelto Starter Pack 15 mg-20 mg oral kit: 1 tab(s) orally 2 times a day     ****Please note per Dr. Bradley the patient was on Metformin  po daily, Vascepa 1g po daily, Levothyroxine 112mcg po qam, Losartan 50mg po daily, Nebivolol 5mg po daily, and Fenofibrate 160mg po daily. Patient's HgbA1C today was 7.9. As noted on prior admission the patient had no PMHx? although likely has Diabetes mellitus type 2, Hypertension, Hyperlipidemia, and Hypothyroidism. Please confirm further in the am.***

## 2022-11-26 NOTE — ED ADULT NURSE NOTE - NSIMPLEMENTINTERV_GEN_ALL_ED
Implemented All Universal Safety Interventions:  [place] to call system. Call bell, personal items and telephone within reach. Instruct patient to call for assistance. Room bathroom lighting operational. Non-slip footwear when patient is off stretcher. Physically safe environment: no spills, clutter or unnecessary equipment. Stretcher in lowest position, wheels locked, appropriate side rails in place.

## 2022-11-26 NOTE — H&P ADULT - NSHPPHYSICALEXAM_GEN_ALL_CORE
Vital Signs Last 24 Hrs  T(C): 36.9 (26 Nov 2022 18:49), Max: 36.9 (26 Nov 2022 18:49)  T(F): 98.4 (26 Nov 2022 18:49), Max: 98.4 (26 Nov 2022 18:49)  HR: 71 (26 Nov 2022 18:49) (71 - 84)  BP: 148/76 (26 Nov 2022 18:49) (137/78 - 148/76)  RR: 16 (26 Nov 2022 18:49) (16 - 18)  SpO2: 96% (26 Nov 2022 18:49) (94% - 98%)    Parameters below as of 26 Nov 2022 18:49  Patient On (Oxygen Delivery Method): room air

## 2022-11-26 NOTE — BH CONSULTATION LIAISON ASSESSMENT NOTE - DETAILS
see hpi; agitation towards family at home and during recent hospital stay lacerations to left forearm

## 2022-11-26 NOTE — ED PROVIDER NOTE - PROGRESS NOTE DETAILS
Medical work-up significant for UTI, labs otherwise nonactionable.  Case discussed with psychiatry who will assess the patient. Janee CABALLERO: Received sign out from Dr. Dias. Spoke with Dr. Vega. Patient with UTI, will admit for UTI, AMS, self injury, psychiatry involved. Hospitalist admission appreciated. Medical work-up significant for UTI, labs otherwise non-actionable.  Case discussed with psychiatry who will assess the patient.

## 2022-11-26 NOTE — ED ADULT NURSE NOTE - OBJECTIVE STATEMENT
Pt is an 82yr old female, A&OX4, brought from home for bizarre behavior including self-inflicted cutting to the L forearm using a nail clipper from this morning. As per daughter, daughter was attempting to give her normal home medications and pt began cutting herself. Multiple noted cuts from L wrist to L elbow. No active bleeding. Pt states "I don't know I just did it. I am 82yrs old I don't want to kill myself". Denies SI/HI, drug/alcohol use, and hallucinations. 1:1 initiated for safety. Pt calm and cooperative at this time.

## 2022-11-26 NOTE — PATIENT PROFILE ADULT - ARRIVAL FROM
Lives in [facility] alone however visiting daughter who lives in [place]

## 2022-11-26 NOTE — H&P ADULT - NSHPSOCIALHISTORY_GEN_ALL_CORE
female   1 adult daughter  Lives alone in [place] in an apartment (staying with daughter for the holidays)  Retired

## 2022-11-26 NOTE — BH CONSULTATION LIAISON ASSESSMENT NOTE - NSBHCHARTREVIEWVS_PSY_A_CORE FT
Vital Signs Last 24 Hrs  T(C): 36.7 (26 Nov 2022 12:05), Max: 36.7 (26 Nov 2022 12:05)  T(F): 98 (26 Nov 2022 12:05), Max: 98 (26 Nov 2022 12:05)  HR: 83 (26 Nov 2022 12:05) (83 - 83)  BP: 143/62 (26 Nov 2022 12:05) (143/62 - 143/62)  BP(mean): --  RR: 18 (26 Nov 2022 12:05) (18 - 18)  SpO2: 94% (26 Nov 2022 12:05) (94% - 94%)    Parameters below as of 26 Nov 2022 12:05  Patient On (Oxygen Delivery Method): room air

## 2022-11-26 NOTE — BH CONSULTATION LIAISON ASSESSMENT NOTE - CURRENT MEDICATION
MEDICATIONS  (STANDING):  cefTRIAXone Injectable. 1000 milliGRAM(s) IV Push once    MEDICATIONS  (PRN):

## 2022-11-26 NOTE — BH CONSULTATION LIAISON ASSESSMENT NOTE - NSBHCHARTREVIEWINVESTIGATE_PSY_A_CORE FT
< from: 12 Lead ECG (11.26.22 @ 12:18) >      Ventricular Rate 88 BPM    Atrial Rate 88 BPM    P-R Interval 152 ms    QRS Duration 64 ms    Q-T Interval 386 ms    QTC Calculation(Bazett) 467 ms    P Axis 1 degrees    R Axis -5 degrees    T Axis 44 degrees    Diagnosis Line Normal sinus rhythm  Poor R wave progression V1-V3  Abnormal ECG  Confirmed by ANGIE CABALLERO, NANI ([id number]) on 11/26/2022 4:35:23 PM    < end of copied text >

## 2022-11-26 NOTE — BH CONSULTATION LIAISON ASSESSMENT NOTE - HPI (INCLUDE ILLNESS QUALITY, SEVERITY, DURATION, TIMING, CONTEXT, MODIFYING FACTORS, ASSOCIATED SIGNS AND SYMPTOMS)
81 y/o  female, with 1 adult daughter, lives alone in [place] in an apartment, with no formal PPHx, no h/o SA/SIB, recent admission to  on 11/11 for COVID, UTI, discharged on 11/23, noted to have episodes of agitation during that admission, with no pertinent PMHx presents to the ED c/o multiple lacerations to her left forearm. Pt not sure how she got the lacerations.  Psychiatry consulted to assess for management of depression s/p self injurious behaviors.    Seen and evaluated, awake and alert orineted to person, confused states it is currently Wed and the month is October but reports that Thanksgiving recently passed.  States she is currently in [facility].  Patient shows writer lacerations to her left forearm, reports she self inflicted these cuts with a knife in her purse, reports can not recall why she did this, states "I was just scratching", denies that she did this with intent to kill herself.  Patient reports she normally lives in [place] by herself, but reports she has been staying with her daughter in [place] for the holidays.  Patient denies previous PPHx, denies that she has been depressed, feeling sad or anxious.  She denies use of alcohol or drugs.  She reports she has been eating and sleeping well.  Denies having any acute complaints.  Patient pleasantly confused.    Spoke with iris's daughterAniya with patient's permission, who reports patient memory impairments for the last few years, states she is with short term memory impairments, can't remember certain conversations or events that have recently happened, states can't rememeber that her grand daughter has been applying for colleges.  She reports at times patient leaves the house to go on walks but hasn't gotten lost.  She reports patient recently in hospital for COVID and was just d/c'ed on 11/23, however during that hospital stay, patient had episodes of agitation, having security called on her, requiring prns of Haldol and Seroquel, reports worsened during medication administration times, states patient "doesn't believe in medications."  She reports last night and into this morning, patient increasingly more agitated, screaming and yelling at home after family tried to give her medications (currently still finishing steroid taper).  She reports patient took nail scissors from her cosmetic bag and began to cut her forearm in front of her grand daughter and once family intervened took out tweezers to try to cut herself to which family called 911.  Daughter reports patient this morning reports to her grand daughter about "wanting to die", but denies patient had ever previously made these statements or had any previous h/o SA or SIB.  She reports once sashanet arrived to the hospital patient did not recall the events of what occurred this morning.  She reports patient prior to her medical admission this month for COVID was living by herself in an apartment in [place], but reports was part of a CDPEP program and was assigned "a helping person" to come assist her in her apartment M-F for a few hours each day.  She reports also seeing patient regularly in her apartment in [place].  Patient does her own cooking, cleaning and shopping at baseline.  She denies patient has ever had any previous PPHx, denies patient uses any illicit substances or alcohol.

## 2022-11-26 NOTE — BH CONSULTATION LIAISON ASSESSMENT NOTE - NSBHCONSULTRECOMMENDOTHER_PSY_A_CORE FT
-resume home dose Seroquel 25mg BID (monitor qtc <500ms on ekg)   -PRN: Haldol 1mg PO/IM q8h PRN agitation/severe agitation.  Monitor for QTc<500.  Melatonin 3mg PO qHS PRN insomnia.  -Consider medical admission to r/o any other organic causes to agitation at home, behavior changes.  Check: TSH, B12, folate, RPR, consider MRI head  -Minimize use of benzos, opioids, anticholinergics, or other deliriogenic agents when possible.  Maintain sleep wake cycle.  Provide frequent reorientation and redirection.  Family member at bedside if possible. Assess for need for glasses and hearing aid (if applicable).  -CL Psych will follow.

## 2022-11-26 NOTE — BH CONSULTATION LIAISON ASSESSMENT NOTE - NSBHMSEREMMEM_PSY_A_CORE
LOC: The patient is awake and alert; oriented x 3 and speaking appropriately.  APPEARANCE: Patient moaning and writhing in pain; unkempt.  SKIN: warm and dry, normal skin turgor & moist mucus membranes, skin intact, no breakdown noted.  MUSCULOSKELETAL: Patient moving all extremities well, no obvious swelling or deformities noted. Pt reports pain radiating from left heel to hip and through left arm.  RESPIRATORY: Airway is open and patent; respirations are spontaneous, normal effort and rate.  CARDIAC: Patient has a normal rate, no peripheral edema noted, capillary refill < 3 seconds; No complaints of chest pain.   ABDOMEN: Soft and non tender to palpation, no distention noted. Denies n/v/d.   Normal

## 2022-11-26 NOTE — ED PROVIDER NOTE - OBJECTIVE STATEMENT
83 y/o female with no pertinent PMHx presents to the ED c/o multiple lacerations to her left forearm. 83 y/o female with no pertinent PMHx presents to the ED c/o multiple lacerations to her left forearm. Pt not sure how she got the lacerations. Denies headache, denies dizziness, denies pain in her chest. Pt states she feels normal. 81 y/o female with no pertinent PMHx presents to the ED c/o multiple lacerations to her left forearm. Pt not sure how she got the lacerations. Per EMS, patient inflicted these lacerations on herself with a nail clipper/file. Patient states "I take a lot of medicines". Denies headache, denies dizziness, denies pain in her chest. Pt states she feels normal.

## 2022-11-26 NOTE — ED PROVIDER NOTE - CLINICAL SUMMARY MEDICAL DECISION MAKING FREE TEXT BOX
81 y/o female here with self-inflicted arm wounds, pt unsure why she did it. Will eval with labs, CT head, UA and wound care.

## 2022-11-26 NOTE — BH CONSULTATION LIAISON ASSESSMENT NOTE - NSBHCHARTREVIEWLAB_PSY_A_CORE FT
14.2    )-----------( 269      ( 2022 12:40 )             41.1         134<L>  |  104  |  24<H>  ----------------------------<  329<H>  3.9   |  21<L>  |  1.23    Ca    8.6      2022 12:40    TPro  6.6  /  Alb  2.9<L>  /  TBili  0.5  /  DBili  x   /  AST  31  /  ALT  49  /  AlkPhos  209<H>      Urinalysis Basic - ( 2022 12:40 )    Color: Yellow / Appearance: Clear / S.020 / pH: x  Gluc: x / Ketone: Negative  / Bili: Negative / Urobili: Negative   Blood: x / Protein: 15 / Nitrite: Negative   Leuk Esterase: Moderate / RBC: 0-2 /HPF / WBC 26-50   Sq Epi: x / Non Sq Epi: x / Bacteria: Few

## 2022-11-26 NOTE — ED PROVIDER NOTE - NS_ ATTENDINGSCRIBEDETAILS _ED_A_ED_FT
I, Trevor Dias DO,  performed the initial face to face bedside interview with this patient regarding history of present illness, review of symptoms and relevant past medical, social and family history.  I completed an independent physical examination.  I was the initial provider who evaluated this patient.   I personally saw the patient and performed a substantive portion of the visit including all aspects of the medical decision making.  The history, relevant review of systems, past medical and surgical history, medical decision making, and physical examination was documented by the scribe in my presence and I attest to the accuracy of the documentation.

## 2022-11-26 NOTE — H&P ADULT - NSICDXPASTMEDICALHX_GEN_ALL_CORE_FT
PAST MEDICAL HISTORY:  2019 novel coronavirus disease (COVID-19)     E. coli UTI (urinary tract infection)     Moderate aortic stenosis

## 2022-11-26 NOTE — BH CONSULTATION LIAISON ASSESSMENT NOTE - SUMMARY
83 y/o  female, with 1 adult daughter, lives alone in [place] in an apartment, with no formal PPHx, no h/o SA/SIB, recent admission to  on 11/11 for COVID, UTI, discharged on 11/23, noted to have episodes of agitation during that admission, with no pertinent PMHx presents to the ED c/o multiple lacerations to her left forearm. Pt not sure how she got the lacerations.  Psychiatry consulted to assess for management of depression s/p self injurious behaviors.  Seen and evaluated, awake and alert, confused about date and event leading up to hospitalization, poor historian.  Denies active SI/HI, AVH, or thoughts of paranoia.  Per daughter, patient with memory impairments in the last few years (short term), states however recently hospitalized for COVID 2 weeks ago and was discharged home on 11/23.  She reports patient agitated at home particularly around the time of medication administration as patient "does not believe in medicines", states patient began screaming this morning that she wanted to die and cut herself with nail scissors and tweezers.  States patient had recent episodes of agitation while in the hospital for which she was discharged home with Seroquel which family has been trying to give her.  She denies patient with any previous PPHx or h/o SA or SIB, they deny patient has ever seen a neurologist.  Would consider medical admission for treatment of UTI, per ED team, as well as completion of her steroid taper which can be contributing to her mood sx.  Pscy CL to follow patient, can use Seroquel 25mg PO BID.

## 2022-11-26 NOTE — ED PROVIDER NOTE - PHYSICAL EXAMINATION
GENERAL: A&Ox3, non-toxic appearing, no acute distress  HEENT: NCAT, EOMI, oral mucosa moist, normal conjunctiva  RESP: CTAB, no respiratory distress, no wheezes/rhonchi/rales, speaking in full sentences  CV: RRR, no murmurs/rubs/gallops  ABDOMEN: soft, non-tender, non-distended, no guarding, no CVA tenderness  MSK: no visible deformities  NEURO: no focal sensory or motor deficits, CN 2-12 grossly intact  SKIN: +multiple linear lacerations to the left forearm  PSYCH: normal affect GENERAL: A&Ox3, non-toxic appearing, no acute distress  HEENT: NCAT, EOMI, oral mucosa moist, normal conjunctiva  RESP: CTAB, no respiratory distress, no wheezes/rhonchi/rales, speaking in full sentences  CV: RRR, +systolic murmur  ABDOMEN: soft, non-tender, non-distended, no guarding, no CVA tenderness  MSK: no visible deformities  NEURO: no focal sensory or motor deficits, CN 2-12 grossly intact  SKIN: +multiple linear lacerations to the left forearm, +bruising to left anterior right elbow/wrist and left elbow/wrist  PSYCH: normal affect GENERAL: A&Ox3, non-toxic appearing, no acute distress  HEENT: NCAT, EOMI, oral mucosa moist, normal conjunctiva  RESP: CTAB, no respiratory distress, no wheezes/rhonchi/rales, speaking in full sentences  CV: RRR, +systolic murmur  ABDOMEN: soft, non-tender, non-distended, no guarding  MSK: no visible deformities  NEURO: no focal sensory or motor deficits, CN 2-12 grossly intact  SKIN: +multiple linear lacerations to the left forearm, +bruising to left anterior right elbow/wrist and left elbow/wrist  PSYCH: normal affect, pleasantly conversant

## 2022-11-26 NOTE — PATIENT PROFILE ADULT - COMPLETE THE FOLLOWING IF THE PATIENT REFUSES THE INFLUENZA VACCINE:
I have a new patient appointment on hold for this patient with  Dr Skylar Phillips from 11-12 4/1  Patient said he will give us a call back because he is finding out about his car right now and may have transportation issues  Risks/benefits discussed with patient/surrogate

## 2022-11-26 NOTE — H&P ADULT - HISTORY OF PRESENT ILLNESS
81 y/o F recent admission to  11/11 - 11/24/2022 for management of acute infectious encephalopathy with hypoxic respiratory failure secondary to COVID19 complicated by multifocal PNA, E. coli UTI was BIBA from home for further evaluation and management of multiple lacerations to left forearm with a nail clipper. As per patient's daughter she was attempting to administer the patient's home medications as prescribed when the patient became upset, frustrated, and started cutting her left arm from her left wrist to her left elbow. In the ED the patient was evaluated by Psychiatry. As noted patient has no formal PPHx, and no history of SA/SIB. Additionally on the patient's prior admission she was noted to have episodes of agitation (requiring prns of Haloperidol and Quetiapine). In the ED the patient was confused awake and alert oriented to person, and place. The patient's daughter reports that the patient has ongoing decline in memory for the last few years and states at times patient leaves the house to go on walks but hasn't gotten lost. The patient's daughter states that since last night and into this morning the patient has been increasingly more agitated, and verbally abusive to her family.   81 y/o F recent admission to  11/11 - 11/24/2022 for management of acute infectious encephalopathy with hypoxic respiratory failure secondary to COVID19 complicated by multifocal PNA, E. coli UTI was BIBA from home for further evaluation and management of multiple lacerations to left forearm with a nail clipper. As per patient's daughter she was attempting to administer the patient's home medications as prescribed when the patient became upset, frustrated, and started cutting her left arm from her left wrist to her left elbow. In the ED the patient was evaluated by Psychiatry. As noted patient has no formal PPHx, and no history of SA/SIB. Additionally on the patient's prior admission she was noted to have episodes of agitation (requiring prns of Haloperidol and Quetiapine). In the ED the patient was confused awake and alert oriented to person, and place. The patient's daughter reports that the patient has ongoing decline in memory for the last few years and states at times patient leaves the house to go on walks but hasn't gotten lost. The patient's daughter states that since last night and into this morning the patient has been increasingly more agitated, and verbally abusive to her family.  Vital signs in triage => /76, HR 71/min, RR 16/min, TMax 98.4'F, and SpO2 96% on room air. Labs => Na 134, BUN//Cr 24/1.23, Glu 329, Alb 2.9, HgbA1C 7.9, (+)UA. CT Head => No evidence of acute intracranial hemorrhage or hydrocephalus. Atrophy with white matter ischemic changes. In the ED the patient was given Cephalexin 500mg PO x 1, Ceftriaxone 1g IVPB x 1, Quetiapine 25mg PO x 1, Haloperidol 1mg PO x 1, and NS x 2L.

## 2022-11-26 NOTE — BH CONSULTATION LIAISON ASSESSMENT NOTE - NSBHATTESTAPPBILLTIME_PSY_A_CORE
I attest my time as JOEY is greater than 50% of the total combined time spent on qualifying patient care activities. I have reviewed and verified the documentation.

## 2022-11-26 NOTE — ED ADULT NURSE NOTE - CHIEF COMPLAINT QUOTE
pt presents to ed via ems from home for evaluation of lacerations to left forearm with a clipper- per daughter, she was trying to medicate pt and pt became upset and frustrated and started cutting her left arm from wrist to elbow. pt was recently admitted to - per daughter pt "acts out". pt admitting to cutting arms , not answering the intention. pt on seroquel  per daughter. 1-1 initiated. bleeding controlled PTA

## 2022-11-27 LAB
ACETONE SERPL-MCNC: NEGATIVE — SIGNIFICANT CHANGE UP
ANION GAP SERPL CALC-SCNC: 9 MMOL/L — SIGNIFICANT CHANGE UP (ref 5–17)
BASOPHILS # BLD AUTO: 0.01 K/UL — SIGNIFICANT CHANGE UP (ref 0–0.2)
BASOPHILS NFR BLD AUTO: 0.1 % — SIGNIFICANT CHANGE UP (ref 0–2)
BUN SERPL-MCNC: 19 MG/DL — SIGNIFICANT CHANGE UP (ref 7–23)
CALCIUM SERPL-MCNC: 8.4 MG/DL — LOW (ref 8.5–10.1)
CHLORIDE SERPL-SCNC: 108 MMOL/L — SIGNIFICANT CHANGE UP (ref 96–108)
CO2 SERPL-SCNC: 22 MMOL/L — SIGNIFICANT CHANGE UP (ref 22–31)
CREAT SERPL-MCNC: 0.83 MG/DL — SIGNIFICANT CHANGE UP (ref 0.5–1.3)
EGFR: 70 ML/MIN/1.73M2 — SIGNIFICANT CHANGE UP
EOSINOPHIL # BLD AUTO: 0.03 K/UL — SIGNIFICANT CHANGE UP (ref 0–0.5)
EOSINOPHIL NFR BLD AUTO: 0.3 % — SIGNIFICANT CHANGE UP (ref 0–6)
FOLATE SERPL-MCNC: 6.9 NG/ML — SIGNIFICANT CHANGE UP
GLUCOSE BLDC GLUCOMTR-MCNC: 161 MG/DL — HIGH (ref 70–99)
GLUCOSE SERPL-MCNC: 191 MG/DL — HIGH (ref 70–99)
HCT VFR BLD CALC: 34.8 % — SIGNIFICANT CHANGE UP (ref 34.5–45)
HGB BLD-MCNC: 12.2 G/DL — SIGNIFICANT CHANGE UP (ref 11.5–15.5)
IMM GRANULOCYTES NFR BLD AUTO: 1 % — HIGH (ref 0–0.9)
LACTATE SERPL-SCNC: 2.3 MMOL/L — HIGH (ref 0.7–2)
LACTATE SERPL-SCNC: 2.4 MMOL/L — HIGH (ref 0.7–2)
LACTATE SERPL-SCNC: 2.9 MMOL/L — HIGH (ref 0.7–2)
LYMPHOCYTES # BLD AUTO: 1.26 K/UL — SIGNIFICANT CHANGE UP (ref 1–3.3)
LYMPHOCYTES # BLD AUTO: 11.2 % — LOW (ref 13–44)
MCHC RBC-ENTMCNC: 28.6 PG — SIGNIFICANT CHANGE UP (ref 27–34)
MCHC RBC-ENTMCNC: 35.1 GM/DL — SIGNIFICANT CHANGE UP (ref 32–36)
MCV RBC AUTO: 81.5 FL — SIGNIFICANT CHANGE UP (ref 80–100)
MONOCYTES # BLD AUTO: 0.44 K/UL — SIGNIFICANT CHANGE UP (ref 0–0.9)
MONOCYTES NFR BLD AUTO: 3.9 % — SIGNIFICANT CHANGE UP (ref 2–14)
NEUTROPHILS # BLD AUTO: 9.45 K/UL — HIGH (ref 1.8–7.4)
NEUTROPHILS NFR BLD AUTO: 83.5 % — HIGH (ref 43–77)
PLATELET # BLD AUTO: 226 K/UL — SIGNIFICANT CHANGE UP (ref 150–400)
POTASSIUM SERPL-MCNC: 3.8 MMOL/L — SIGNIFICANT CHANGE UP (ref 3.5–5.3)
POTASSIUM SERPL-SCNC: 3.8 MMOL/L — SIGNIFICANT CHANGE UP (ref 3.5–5.3)
RBC # BLD: 4.27 M/UL — SIGNIFICANT CHANGE UP (ref 3.8–5.2)
RBC # FLD: 14.2 % — SIGNIFICANT CHANGE UP (ref 10.3–14.5)
SODIUM SERPL-SCNC: 139 MMOL/L — SIGNIFICANT CHANGE UP (ref 135–145)
TSH SERPL-MCNC: 4.74 UU/ML — SIGNIFICANT CHANGE UP (ref 0.34–4.82)
VIT B12 SERPL-MCNC: 608 PG/ML — SIGNIFICANT CHANGE UP (ref 232–1245)
WBC # BLD: 11.3 K/UL — HIGH (ref 3.8–10.5)
WBC # FLD AUTO: 11.3 K/UL — HIGH (ref 3.8–10.5)

## 2022-11-27 PROCEDURE — 71045 X-RAY EXAM CHEST 1 VIEW: CPT

## 2022-11-27 PROCEDURE — 82962 GLUCOSE BLOOD TEST: CPT

## 2022-11-27 PROCEDURE — 97530 THERAPEUTIC ACTIVITIES: CPT | Mod: GP

## 2022-11-27 PROCEDURE — 93005 ELECTROCARDIOGRAM TRACING: CPT

## 2022-11-27 PROCEDURE — 87635 SARS-COV-2 COVID-19 AMP PRB: CPT

## 2022-11-27 PROCEDURE — 80202 ASSAY OF VANCOMYCIN: CPT

## 2022-11-27 PROCEDURE — 85025 COMPLETE CBC W/AUTO DIFF WBC: CPT

## 2022-11-27 PROCEDURE — 81001 URINALYSIS AUTO W/SCOPE: CPT

## 2022-11-27 PROCEDURE — 87040 BLOOD CULTURE FOR BACTERIA: CPT

## 2022-11-27 PROCEDURE — 84436 ASSAY OF TOTAL THYROXINE: CPT

## 2022-11-27 PROCEDURE — 84100 ASSAY OF PHOSPHORUS: CPT

## 2022-11-27 PROCEDURE — 83605 ASSAY OF LACTIC ACID: CPT

## 2022-11-27 PROCEDURE — 99232 SBSQ HOSP IP/OBS MODERATE 35: CPT

## 2022-11-27 PROCEDURE — 97161 PT EVAL LOW COMPLEX 20 MIN: CPT | Mod: GP

## 2022-11-27 PROCEDURE — 80048 BASIC METABOLIC PNL TOTAL CA: CPT

## 2022-11-27 PROCEDURE — 84484 ASSAY OF TROPONIN QUANT: CPT

## 2022-11-27 PROCEDURE — 85027 COMPLETE CBC AUTOMATED: CPT

## 2022-11-27 PROCEDURE — 97116 GAIT TRAINING THERAPY: CPT | Mod: GP

## 2022-11-27 PROCEDURE — U0003: CPT

## 2022-11-27 PROCEDURE — 83735 ASSAY OF MAGNESIUM: CPT

## 2022-11-27 PROCEDURE — 80053 COMPREHEN METABOLIC PANEL: CPT

## 2022-11-27 PROCEDURE — U0005: CPT

## 2022-11-27 PROCEDURE — 36415 COLL VENOUS BLD VENIPUNCTURE: CPT

## 2022-11-27 RX ORDER — RIVAROXABAN 15 MG-20MG
15 KIT ORAL
Refills: 0 | Status: DISCONTINUED | OUTPATIENT
Start: 2022-11-27 | End: 2022-12-12

## 2022-11-27 RX ORDER — RIVAROXABAN 15 MG-20MG
15 KIT ORAL ONCE
Refills: 0 | Status: COMPLETED | OUTPATIENT
Start: 2022-11-27 | End: 2022-11-27

## 2022-11-27 RX ORDER — LOSARTAN POTASSIUM 100 MG/1
25 TABLET, FILM COATED ORAL DAILY
Refills: 0 | Status: DISCONTINUED | OUTPATIENT
Start: 2022-11-27 | End: 2022-11-28

## 2022-11-27 RX ORDER — CEFTRIAXONE 500 MG/1
1000 INJECTION, POWDER, FOR SOLUTION INTRAMUSCULAR; INTRAVENOUS EVERY 24 HOURS
Refills: 0 | Status: DISCONTINUED | OUTPATIENT
Start: 2022-11-27 | End: 2022-11-28

## 2022-11-27 RX ORDER — ALBUTEROL 90 UG/1
2 AEROSOL, METERED ORAL EVERY 6 HOURS
Refills: 0 | Status: DISCONTINUED | OUTPATIENT
Start: 2022-11-26 | End: 2022-12-12

## 2022-11-27 RX ORDER — SODIUM CHLORIDE 9 MG/ML
1000 INJECTION, SOLUTION INTRAVENOUS
Refills: 0 | Status: DISCONTINUED | OUTPATIENT
Start: 2022-11-27 | End: 2022-11-28

## 2022-11-27 RX ADMIN — CEFTRIAXONE 1000 MILLIGRAM(S): 500 INJECTION, POWDER, FOR SOLUTION INTRAMUSCULAR; INTRAVENOUS at 21:40

## 2022-11-27 RX ADMIN — QUETIAPINE FUMARATE 25 MILLIGRAM(S): 200 TABLET, FILM COATED ORAL at 21:40

## 2022-11-27 RX ADMIN — Medication 3: at 16:25

## 2022-11-27 RX ADMIN — QUETIAPINE FUMARATE 25 MILLIGRAM(S): 200 TABLET, FILM COATED ORAL at 09:03

## 2022-11-27 RX ADMIN — Medication 1: at 07:45

## 2022-11-27 RX ADMIN — SODIUM CHLORIDE 60 MILLILITER(S): 9 INJECTION, SOLUTION INTRAVENOUS at 16:37

## 2022-11-27 RX ADMIN — Medication 1: at 11:43

## 2022-11-27 RX ADMIN — RIVAROXABAN 15 MILLIGRAM(S): KIT at 17:12

## 2022-11-27 RX ADMIN — Medication 40 MILLIGRAM(S): at 09:03

## 2022-11-27 RX ADMIN — RIVAROXABAN 15 MILLIGRAM(S): KIT at 05:10

## 2022-11-27 NOTE — PROGRESS NOTE ADULT - ASSESSMENT
81 y/o F recent admission to  11/11 - 11/24/2022 for management of acute infectious encephalopathy with hypoxic respiratory failure secondary to COVID19 complicated by multifocal PNA, E. coli UTI was BIBA from home for further evaluation and management of multiple lacerations to left forearm with a nail clipper. As per patient's daughter she was attempting to administer the patient's home medications as prescribed when the patient became upset, frustrated, and started cutting her left arm from her left wrist to her left elbow. In the ED the patient was evaluated by Psychiatry. As noted patient has no formal PPHx, and no history of SA/SIB. Additionally on the patient's prior admission she was noted to have episodes of agitation (requiring prns of Haloperidol and Quetiapine). In the ED the patient was confused awake and alert oriented to person, and place. The patient's daughter reports that the patient has ongoing decline in memory for the last few years and states at times patient leaves the house to go on walks but hasn't gotten lost. The patient's daughter states that since last night and into this morning the patient has been increasingly more agitated, and verbally abusive to her family.  Vital signs in triage => /76, HR 71/min, RR 16/min, TMax 98.4'F, and SpO2 96% on room air. Labs => Na 134, BUN//Cr 24/1.23, Glu 329, Alb 2.9, HgbA1C 7.9, (+)UA. CT Head => No evidence of acute intracranial hemorrhage or hydrocephalus. Atrophy with white matter ischemic changes. In the ED the patient was given Cephalexin 500mg PO x 1, Ceftriaxone 1g IVPB x 1, Quetiapine 25mg PO x 1, Haloperidol 1mg PO x 1, and NS x 2L.    #s/p Self Injurious Behaviors/SA resulting in multiple Linear Lacerations to the Left Arm  ~observe on Medicine  ~cont. constant observation  ~Psychiatry consultation greatly appreciated  ~DDx includes Delirium vs Dementia vs MDD  ~cont. Quetiapine 25mg po BID (monitor qtc <500ms on ekg)   ~cont. Haloperidol 1mg PO/IM q8h PRN agitation/severe agitation.  Monitor for QTc<500    ~cont. Melatonin 3mg PO qHS PRN insomnia~f/u am labs including TSH, B12, folate, RPR  ~per Psychiatry minimize use of Benzodiazepines opioids, anticholinergics, or other deliriogenic agents when possible.    #UTI (Infectious Encephalopathy?)  ~f/u PAN C+S  ~cont. IV hydration  ~strict I/Os  ~cont. IV abx     #Recent COVID-19  ~cont. Albuterol 90 mcg/inh 2 puffs inhaled every 6 hours, prn  ~cont. Prednisone taper (may be contributing to this patient's presenting symptoms) 10 mg oral tablet: 5 tab(s) PO QD x 1 days, 4 tab(s) PO QD x 3 days, 3 tab(s) PO  QD x 3 days, 2 tab(s) PO  QD x 3 days, 1 tab(s) PO  QD x 3  days  ~cont. Xarelto Starter Pack 15 mg-20 mg oral kit: 1 tab(s) orally 2 times a day     #Vte ppx  ~cont. Xarelto Starter Pack 15 mg-20 mg oral kit: 1 tab(s) orally 2 times a day      per Dr. Bradley the patient was on Metformin  po daily, Vascepa 1g po daily, Levothyroxine 112mcg po qam, Losartan 50mg po daily, Nebivolol 5mg po daily, and Fenofibrate 160mg po daily. Patient's HgbA1C today was 7.9. As noted on prior admission the patient had no PMHx? although likely has Diabetes mellitus type 2, Hypertension, Hyperlipidemia, and Hypothyroidism.    83 y/o F recent admission to  11/11 - 11/24/2022 for management of acute infectious encephalopathy with hypoxic respiratory failure secondary to COVID19 complicated by multifocal PNA, E. coli UTI was BIBA from home for further evaluation and management of multiple lacerations to left forearm with a nail clipper. As per patient's daughter she was attempting to administer the patient's home medications as prescribed when the patient became upset, frustrated, and started cutting her left arm from her left wrist to her left elbow. In the ED the patient was evaluated by Psychiatry. As noted patient has no formal PPHx, and no history of SA/SIB. Additionally on the patient's prior admission she was noted to have episodes of agitation (requiring prns of Haloperidol and Quetiapine). In the ED the patient was confused awake and alert oriented to person, and place. The patient's daughter reports that the patient has ongoing decline in memory for the last few years and states at times patient leaves the house to go on walks but hasn't gotten lost. The patient's daughter states that since last night and into this morning the patient has been increasingly more agitated, and verbally abusive to her family.  Vital signs in triage => /76, HR 71/min, RR 16/min, TMax 98.4'F, and SpO2 96% on room air. Labs => Na 134, BUN//Cr 24/1.23, Glu 329, Alb 2.9, HgbA1C 7.9, (+)UA. CT Head => No evidence of acute intracranial hemorrhage or hydrocephalus. Atrophy with white matter ischemic changes. In the ED the patient was given Cephalexin 500mg PO x 1, Ceftriaxone 1g IVPB x 1, Quetiapine 25mg PO x 1, Haloperidol 1mg PO x 1, and NS x 2L.    #s/p Self Injurious Behaviors/SA resulting in multiple Linear Lacerations to the Left Arm  ~observe on Medicine  ~cont. constant observation  ~Psychiatry consultation greatly appreciated  ~DDx includes Delirium vs Dementia vs MDD  ~cont. Quetiapine 25mg po BID (monitor qtc <500ms on ekg)   ~cont. Haloperidol 1mg PO/IM q8h PRN agitation/severe agitation.  Monitor for QTc<500    ~cont. Melatonin 3mg PO qHS PRN insomnia~f/u am labs including TSH, B12, folate, RPR  ~per Psychiatry minimize use of Benzodiazepines opioids, anticholinergics, or other deliriogenic agents when possible.    #UTI (Infectious Encephalopathy?)  ~f/u PAN C+S  ~cont. IV hydration  ~strict I/Os  ~cont. IV abx     #Recent COVID-19  ~cont. Albuterol 90 mcg/inh 2 puffs inhaled every 6 hours, prn  ~cont. Prednisone taper (may be contributing to this patient's presenting symptoms) 10 mg oral tablet: 5 tab(s) PO QD x 1 days, 4 tab(s) PO QD x 3 days, 3 tab(s) PO  QD x 3 days, 2 tab(s) PO  QD x 3 days, 1 tab(s) PO  QD x 3  days  ~cont. Xarelto Starter Pack 15 mg-20 mg oral kit: 1 tab(s) orally 2 times a day     # HTN   started losartan        #Vte ppx  ~cont. Xarelto Starter Pack 15 mg-20 mg oral kit: 1 tab(s) orally 2 times a day      per Dr. Bradley the patient was on Metformin  po daily, Vascepa 1g po daily, Levothyroxine 112mcg po qam, Losartan 50mg po daily, Nebivolol 5mg po daily, and Fenofibrate 160mg po daily. Patient's HgbA1C today was 7.9. As noted on prior admission the patient had no PMHx? although likely has Diabetes mellitus type 2, Hypertension, Hyperlipidemia, and Hypothyroidism.

## 2022-11-27 NOTE — CONSULT NOTE ADULT - SUBJECTIVE AND OBJECTIVE BOX
Patient is a 82y old  Female who presents with a chief complaint of     HPI:  83 y/o F recent admission to   - 2022 for management of acute infectious encephalopathy with hypoxic respiratory failure secondary to COVID19 complicated by multifocal PNA, E. coli UTI was BIBA from home for further evaluation and management of multiple lacerations to left forearm with a nail clipper. As per patient's daughter she was attempting to administer the patient's home medications as prescribed when the patient became upset, frustrated, and started cutting her left arm from her left wrist to her left elbow. In the ED the patient was evaluated by Psychiatry. As noted patient has no formal PPHx, and no history of SA/SIB. Additionally on the patient's prior admission she was noted to have episodes of agitation (requiring prns of Haloperidol and Quetiapine). In the ED the patient was confused awake and alert oriented to person, and place. The patient's daughter reports that the patient has ongoing decline in memory for the last few years and states at times patient leaves the house to go on walks but hasn't gotten lost. The patient's daughter states that since last night and into this morning the patient has been increasingly more agitated, and verbally abusive to her family.  Vital signs in triage => /76, HR 71/min, RR 16/min, TMax 98.4'F, and SpO2 96% on room air. Labs => Na 134, BUN//Cr 24/1.23, Glu 329, Alb 2.9, HgbA1C 7.9, (+)UA. CT Head => No evidence of acute intracranial hemorrhage or hydrocephalus. Atrophy with white matter ischemic changes. In the ED the patient was given Cephalexin 500mg PO x 1, Ceftriaxone 1g IVPB x 1, Quetiapine 25mg PO x 1, Haloperidol 1mg PO x 1, and NS x 2L. (2022 22:37)    pt is seen and data discussed with her RN at bedside  she is on 1:1 observation  last seen during COVID pneumonia at  [address] [place] and looked excited to be discharged home  as above now readmitted  not c/o pulm issues presently  on tapering steroids  PAST MEDICAL & SURGICAL HISTORY:  2019 novel coronavirus disease (COVID-19)      E. coli UTI (urinary tract infection)      Moderate aortic stenosis      No significant past surgical history          PREVIOUS DIAGNOSTIC TESTING:      MEDICATIONS  (STANDING):  cefTRIAXone Injectable. 1000 milliGRAM(s) IV Push every 24 hours  dextrose 5%. 1000 milliLiter(s) (50 mL/Hr) IV Continuous <Continuous>  dextrose 5%. 1000 milliLiter(s) (100 mL/Hr) IV Continuous <Continuous>  dextrose 50% Injectable 25 Gram(s) IV Push once  dextrose 50% Injectable 12.5 Gram(s) IV Push once  dextrose 50% Injectable 25 Gram(s) IV Push once  glucagon  Injectable 1 milliGRAM(s) IntraMuscular once  insulin lispro (ADMELOG) corrective regimen sliding scale   SubCutaneous three times a day before meals  insulin lispro (ADMELOG) corrective regimen sliding scale   SubCutaneous at bedtime  predniSONE   Tablet 40 milliGRAM(s) Oral daily  QUEtiapine 25 milliGRAM(s) Oral two times a day  rivaroxaban 15 milliGRAM(s) Oral once  rivaroxaban 15 milliGRAM(s) Oral two times a day    MEDICATIONS  (PRN):  albuterol    90 MICROgram(s) HFA Inhaler 2 Puff(s) Inhalation every 6 hours PRN Shortness of Breath and/or Wheezing  dextrose Oral Gel 15 Gram(s) Oral once PRN Blood Glucose LESS THAN 70 milliGRAM(s)/deciliter  haloperidol     Tablet 1 milliGRAM(s) Oral every 8 hours PRN agitation      FAMILY HISTORY:  No pertinent family history in first degree relatives        SOCIAL HISTORY:  ***    REVIEW OF SYSTEM:  Pertinent items are noted in HPI.    Vital Signs Last 24 Hrs  T(C): 36.6 (2022 08:55), Max: 36.9 (2022 18:49)  T(F): 97.9 (2022 08:55), Max: 98.4 (2022 18:49)  HR: 74 (2022 08:55) (71 - 85)  BP: 159/89 (2022 08:55) (137/78 - 159/89)  BP(mean): --  RR: 19 (2022 08:55) (16 - 19)  SpO2: 94% (2022 08:55) (94% - 98%)    Parameters below as of 2022 08:55  Patient On (Oxygen Delivery Method): room air        I&O's Summary    2022 07:01  -  2022 07:00  --------------------------------------------------------  IN: 520 mL / OUT: 0 mL / NET: 520 mL      PHYSICAL EXAM  General Appearance: cooperative, no acute distress,   HEENT: PERRL, conjunctiva clear, EOM's intact, non injected pharynx, no exudate, TM   normal  Neck: Supple, , no adenopathy, thyroid: not enlarged, no carotid bruit or JVD  Back: Symmetric, no  tenderness,no soft tissue tenderness  Lungs: bibasilar crackles  Heart: Regular rate and rhythm, S1, S2 normal, no murmur, rub or gallop  Abdomen: Soft, non-tender, bowel sounds active , no hepatosplenomegaly  Extremities: no cyanosis or edema, no joint swelling  Skin: multiple cut marks left upper exts  Neurologic: Alert and oriented X3 , cranial nerves intact, sensory and motor normal,    ECG:    LABS:                          14.2   11.90 )-----------( 269      ( 2022 12:40 )             41.1         134<L>  |  104  |  24<H>  ----------------------------<  329<H>  3.9   |  21<L>  |  1.23    Ca    8.6      2022 12:40    TPro  6.6  /  Alb  2.9<L>  /  TBili  0.5  /  DBili  x   /  AST  31  /  ALT  49  /  AlkPhos  209<H>            Pro BNP  --  @ 08:36  D Dimer   @ 08:36  Pro BNP  --  @ 10:44  D Dimer   @ 10:44  Pro BNP  --  @ 06:35  D Dimer   @ 06:35  Pro BNP  -- 20 @ 17:21  D Dimer  239 1120 @ 17:21      Urinalysis Basic - ( 2022 12:40 )    Color: Yellow / Appearance: Clear / S.020 / pH: x  Gluc: x / Ketone: Negative  / Bili: Negative / Urobili: Negative   Blood: x / Protein: 15 / Nitrite: Negative   Leuk Esterase: Moderate / RBC: 0-2 /HPF / WBC 26-50   Sq Epi: x / Non Sq Epi: x / Bacteria: Few            RADIOLOGY & ADDITIONAL STUDIES:  < from: CT Head No Cont (22 @ 14:00) >    CLINICAL HISTORY: AMS    TECHNIQUE:  Noncontrast CT.  Axial Acquisition.  Sagittal and coronal reformations.    COMPARISON:  Compared to study dated 2022    FINDINGS:  *  HEMORRHAGE:  No evidence of intracranial hemorrhage.  *  BRAIN PARENCHYMA:  Mild to moderate atrophy. Mild periventricular   white matter ischemic changes  No mass or mass effect.  *  VENTRICLES / SHIFT:  No hydrocephalus. No midline shift.  *  EXTRA-AXIAL / BASAL CISTERNS:  No extra-axial mass. Basal cisterns   preserved.  Atherosclerotic calcifications of the cavernous internal   carotid arteries.  *  CALVARIUM AND EXTRACRANIAL SOFT TISSUES:  No depressed calvarial   fracture.  *  SINUSES, ORBITS, MASTOIDS:  The visualized paranasal sinuses and   mastoid air cells are well aerated.    IMPRESSION:  NO EVIDENCE OF ACUTE INTRACRANIAL HEMORRHAGE OR HYDROCEPHALUS.  ATROPHY   WITH WHITE MATTER ISCHEMIC CHANGES.    < end of copied text >  < from: Xray Chest 1 View- PORTABLE-Routine (Xray Chest 1 View- PORTABLE-Routine in AM.) (22 @ 08:46) >  PROCEDURE DATE:  2022          INTERPRETATION:  Chest one view 2022 11:11 AM    HISTORY:Covid-19 pneumonia    COMPARISON STUDY: 2022    Frontal expiratory view of the chest shows the heart to be normal in   size. The lungs show progression of patchy infiltrates, right more than   left and there is no evidence of pneumothorax nor pleural effusion.    Chest one view 2022 8:22 AM  Compared to the prior study, there is slight clearing of the lungs.    IMPRESSION:  Progression then improvement of patchy infiltrates.    < end of copied text >

## 2022-11-27 NOTE — CONSULT NOTE ADULT - ASSESSMENT
83 y/o F recent admission to  11/11 - 11/24/2022 for management of acute infectious encephalopathy with hypoxic respiratory failure secondary to COVID19 complicated by multifocal PNA, E. coli UTI was BIBA from home for further evaluation and management of multiple lacerations to left forearm with a nail clipper. As per patient's daughter she was attempting to administer the patient's home medications as prescribed when the patient became upset, frustrated, and started cutting her left arm from her left wrist to her left elbow. In the ED the patient was evaluated by Psychiatry. As noted patient has no formal PPHx, and no history of SA/SIB. Additionally on the patient's prior admission she was noted to have episodes of agitation (requiring prns of Haloperidol and Quetiapine). In the ED the patient was confused awake and alert oriented to person, and place. The patient's daughter reports that the patient has ongoing decline in memory for the last few years and states at times patient leaves the house to go on walks but hasn't gotten lost. The patient's daughter states that since last night and into this morning the patient has been increasingly more agitated, and verbally abusive to her family.  Vital signs in triage => /76, HR 71/min, RR 16/min, TMax 98.4'F, and SpO2 96% on room air. Labs => Na 134, BUN//Cr 24/1.23, Glu 329, Alb 2.9, HgbA1C 7.9, (+)UA. CT Head => No evidence of acute intracranial hemorrhage or hydrocephalus. Atrophy with white matter ischemic changes. In the ED the patient was given Cephalexin 500mg PO x 1, Ceftriaxone 1g IVPB x 1, Quetiapine 25mg PO x 1, Haloperidol 1mg PO x 1, and NS x 2L. (26 Nov 2022 22:37)    pt is seen and data discussed with her RN at bedside  she is on 1:1 observation  last seen during COVID pneumonia at  3 [place] and looked excited to be discharged home  as above now readmitted  not c/o pulm issues presently  on tapering steroids    Assessment / plan:  Metabolic encephalopathy  s/p bilateral severe COVID pneumonia  Resp status clinically and radiographically improved  underlying psych condition suspected  adequate oxygenation now on room air    agree with tapering steroids quickly due to overall presentation  monitor resp status  Psych eval needed and 1:1 observation for protection  mobilize OOB as tolerated  DR Childs will resume in am

## 2022-11-28 DIAGNOSIS — N39.0 URINARY TRACT INFECTION, SITE NOT SPECIFIED: ICD-10-CM

## 2022-11-28 LAB
-  AMIKACIN: SIGNIFICANT CHANGE UP
-  AMPICILLIN: SIGNIFICANT CHANGE UP
-  AZTREONAM: SIGNIFICANT CHANGE UP
-  CEFEPIME: SIGNIFICANT CHANGE UP
-  CEFTAZIDIME: SIGNIFICANT CHANGE UP
-  CIPROFLOXACIN: SIGNIFICANT CHANGE UP
-  CIPROFLOXACIN: SIGNIFICANT CHANGE UP
-  GENTAMICIN: SIGNIFICANT CHANGE UP
-  IMIPENEM: SIGNIFICANT CHANGE UP
-  LEVOFLOXACIN: SIGNIFICANT CHANGE UP
-  LEVOFLOXACIN: SIGNIFICANT CHANGE UP
-  MEROPENEM: SIGNIFICANT CHANGE UP
-  NITROFURANTOIN: SIGNIFICANT CHANGE UP
-  PIPERACILLIN/TAZOBACTAM: SIGNIFICANT CHANGE UP
-  TETRACYCLINE: SIGNIFICANT CHANGE UP
-  TOBRAMYCIN: SIGNIFICANT CHANGE UP
-  VANCOMYCIN: SIGNIFICANT CHANGE UP
ALBUMIN SERPL ELPH-MCNC: 2.4 G/DL — LOW (ref 3.3–5)
ALP SERPL-CCNC: 146 U/L — HIGH (ref 40–120)
ALT FLD-CCNC: 34 U/L — SIGNIFICANT CHANGE UP (ref 12–78)
ANION GAP SERPL CALC-SCNC: 10 MMOL/L — SIGNIFICANT CHANGE UP (ref 5–17)
ANION GAP SERPL CALC-SCNC: 7 MMOL/L — SIGNIFICANT CHANGE UP (ref 5–17)
AST SERPL-CCNC: 22 U/L — SIGNIFICANT CHANGE UP (ref 15–37)
BASOPHILS # BLD AUTO: 0.01 K/UL — SIGNIFICANT CHANGE UP (ref 0–0.2)
BASOPHILS # BLD AUTO: 0.02 K/UL — SIGNIFICANT CHANGE UP (ref 0–0.2)
BASOPHILS NFR BLD AUTO: 0.1 % — SIGNIFICANT CHANGE UP (ref 0–2)
BASOPHILS NFR BLD AUTO: 0.2 % — SIGNIFICANT CHANGE UP (ref 0–2)
BILIRUB SERPL-MCNC: 0.5 MG/DL — SIGNIFICANT CHANGE UP (ref 0.2–1.2)
BUN SERPL-MCNC: 16 MG/DL — SIGNIFICANT CHANGE UP (ref 7–23)
BUN SERPL-MCNC: 17 MG/DL — SIGNIFICANT CHANGE UP (ref 7–23)
CALCIUM SERPL-MCNC: 8.2 MG/DL — LOW (ref 8.5–10.1)
CALCIUM SERPL-MCNC: 8.7 MG/DL — SIGNIFICANT CHANGE UP (ref 8.5–10.1)
CHLORIDE SERPL-SCNC: 104 MMOL/L — SIGNIFICANT CHANGE UP (ref 96–108)
CHLORIDE SERPL-SCNC: 110 MMOL/L — HIGH (ref 96–108)
CO2 SERPL-SCNC: 20 MMOL/L — LOW (ref 22–31)
CO2 SERPL-SCNC: 21 MMOL/L — LOW (ref 22–31)
CREAT SERPL-MCNC: 0.86 MG/DL — SIGNIFICANT CHANGE UP (ref 0.5–1.3)
CREAT SERPL-MCNC: 0.91 MG/DL — SIGNIFICANT CHANGE UP (ref 0.5–1.3)
CULTURE RESULTS: SIGNIFICANT CHANGE UP
EGFR: 63 ML/MIN/1.73M2 — SIGNIFICANT CHANGE UP
EGFR: 67 ML/MIN/1.73M2 — SIGNIFICANT CHANGE UP
EOSINOPHIL # BLD AUTO: 0.04 K/UL — SIGNIFICANT CHANGE UP (ref 0–0.5)
EOSINOPHIL # BLD AUTO: 0.12 K/UL — SIGNIFICANT CHANGE UP (ref 0–0.5)
EOSINOPHIL NFR BLD AUTO: 0.4 % — SIGNIFICANT CHANGE UP (ref 0–6)
EOSINOPHIL NFR BLD AUTO: 0.9 % — SIGNIFICANT CHANGE UP (ref 0–6)
GLUCOSE SERPL-MCNC: 142 MG/DL — HIGH (ref 70–99)
GLUCOSE SERPL-MCNC: 197 MG/DL — HIGH (ref 70–99)
HCT VFR BLD CALC: 33.8 % — LOW (ref 34.5–45)
HCT VFR BLD CALC: 44.3 % — SIGNIFICANT CHANGE UP (ref 34.5–45)
HGB BLD-MCNC: 11.8 G/DL — SIGNIFICANT CHANGE UP (ref 11.5–15.5)
HGB BLD-MCNC: 15.4 G/DL — SIGNIFICANT CHANGE UP (ref 11.5–15.5)
IMM GRANULOCYTES NFR BLD AUTO: 0.9 % — SIGNIFICANT CHANGE UP (ref 0–0.9)
IMM GRANULOCYTES NFR BLD AUTO: 1.3 % — HIGH (ref 0–0.9)
LACTATE SERPL-SCNC: 2.5 MMOL/L — HIGH (ref 0.7–2)
LYMPHOCYTES # BLD AUTO: 1.11 K/UL — SIGNIFICANT CHANGE UP (ref 1–3.3)
LYMPHOCYTES # BLD AUTO: 11.3 % — LOW (ref 13–44)
LYMPHOCYTES # BLD AUTO: 24.5 % — SIGNIFICANT CHANGE UP (ref 13–44)
LYMPHOCYTES # BLD AUTO: 3.16 K/UL — SIGNIFICANT CHANGE UP (ref 1–3.3)
MAGNESIUM SERPL-MCNC: 2 MG/DL — SIGNIFICANT CHANGE UP (ref 1.6–2.6)
MCHC RBC-ENTMCNC: 28.3 PG — SIGNIFICANT CHANGE UP (ref 27–34)
MCHC RBC-ENTMCNC: 28.5 PG — SIGNIFICANT CHANGE UP (ref 27–34)
MCHC RBC-ENTMCNC: 34.8 GM/DL — SIGNIFICANT CHANGE UP (ref 32–36)
MCHC RBC-ENTMCNC: 34.9 GM/DL — SIGNIFICANT CHANGE UP (ref 32–36)
MCV RBC AUTO: 81.1 FL — SIGNIFICANT CHANGE UP (ref 80–100)
MCV RBC AUTO: 81.9 FL — SIGNIFICANT CHANGE UP (ref 80–100)
METHOD TYPE: SIGNIFICANT CHANGE UP
METHOD TYPE: SIGNIFICANT CHANGE UP
MONOCYTES # BLD AUTO: 0.4 K/UL — SIGNIFICANT CHANGE UP (ref 0–0.9)
MONOCYTES # BLD AUTO: 0.75 K/UL — SIGNIFICANT CHANGE UP (ref 0–0.9)
MONOCYTES NFR BLD AUTO: 4.1 % — SIGNIFICANT CHANGE UP (ref 2–14)
MONOCYTES NFR BLD AUTO: 5.8 % — SIGNIFICANT CHANGE UP (ref 2–14)
NEUTROPHILS # BLD AUTO: 8.13 K/UL — HIGH (ref 1.8–7.4)
NEUTROPHILS # BLD AUTO: 8.7 K/UL — HIGH (ref 1.8–7.4)
NEUTROPHILS NFR BLD AUTO: 67.3 % — SIGNIFICANT CHANGE UP (ref 43–77)
NEUTROPHILS NFR BLD AUTO: 83.2 % — HIGH (ref 43–77)
ORGANISM # SPEC MICROSCOPIC CNT: SIGNIFICANT CHANGE UP
PHOSPHATE SERPL-MCNC: 2.4 MG/DL — LOW (ref 2.5–4.5)
PLATELET # BLD AUTO: 219 K/UL — SIGNIFICANT CHANGE UP (ref 150–400)
PLATELET # BLD AUTO: 225 K/UL — SIGNIFICANT CHANGE UP (ref 150–400)
POTASSIUM SERPL-MCNC: 3.6 MMOL/L — SIGNIFICANT CHANGE UP (ref 3.5–5.3)
POTASSIUM SERPL-MCNC: 4.3 MMOL/L — SIGNIFICANT CHANGE UP (ref 3.5–5.3)
POTASSIUM SERPL-SCNC: 3.6 MMOL/L — SIGNIFICANT CHANGE UP (ref 3.5–5.3)
POTASSIUM SERPL-SCNC: 4.3 MMOL/L — SIGNIFICANT CHANGE UP (ref 3.5–5.3)
PROT SERPL-MCNC: 6.1 GM/DL — SIGNIFICANT CHANGE UP (ref 6–8.3)
RBC # BLD: 4.17 M/UL — SIGNIFICANT CHANGE UP (ref 3.8–5.2)
RBC # BLD: 5.41 M/UL — HIGH (ref 3.8–5.2)
RBC # FLD: 14.3 % — SIGNIFICANT CHANGE UP (ref 10.3–14.5)
RBC # FLD: 14.4 % — SIGNIFICANT CHANGE UP (ref 10.3–14.5)
SODIUM SERPL-SCNC: 134 MMOL/L — LOW (ref 135–145)
SODIUM SERPL-SCNC: 138 MMOL/L — SIGNIFICANT CHANGE UP (ref 135–145)
SPECIMEN SOURCE: SIGNIFICANT CHANGE UP
T PALLIDUM AB TITR SER: NEGATIVE — SIGNIFICANT CHANGE UP
T4 AB SER-ACNC: 5.1 UG/DL — SIGNIFICANT CHANGE UP (ref 4.6–12)
WBC # BLD: 12.92 K/UL — HIGH (ref 3.8–10.5)
WBC # BLD: 9.78 K/UL — SIGNIFICANT CHANGE UP (ref 3.8–10.5)
WBC # FLD AUTO: 12.92 K/UL — HIGH (ref 3.8–10.5)
WBC # FLD AUTO: 9.78 K/UL — SIGNIFICANT CHANGE UP (ref 3.8–10.5)

## 2022-11-28 PROCEDURE — 99231 SBSQ HOSP IP/OBS SF/LOW 25: CPT

## 2022-11-28 PROCEDURE — 71045 X-RAY EXAM CHEST 1 VIEW: CPT | Mod: 26

## 2022-11-28 PROCEDURE — 99291 CRITICAL CARE FIRST HOUR: CPT

## 2022-11-28 PROCEDURE — 99233 SBSQ HOSP IP/OBS HIGH 50: CPT

## 2022-11-28 PROCEDURE — 93010 ELECTROCARDIOGRAM REPORT: CPT

## 2022-11-28 RX ORDER — HALOPERIDOL DECANOATE 100 MG/ML
1 INJECTION INTRAMUSCULAR EVERY 6 HOURS
Refills: 0 | Status: DISCONTINUED | OUTPATIENT
Start: 2022-11-28 | End: 2022-11-29

## 2022-11-28 RX ORDER — SODIUM CHLORIDE 9 MG/ML
1000 INJECTION INTRAMUSCULAR; INTRAVENOUS; SUBCUTANEOUS ONCE
Refills: 0 | Status: COMPLETED | OUTPATIENT
Start: 2022-11-28 | End: 2022-11-28

## 2022-11-28 RX ORDER — CEFEPIME 1 G/1
2000 INJECTION, POWDER, FOR SOLUTION INTRAMUSCULAR; INTRAVENOUS EVERY 12 HOURS
Refills: 0 | Status: COMPLETED | OUTPATIENT
Start: 2022-11-28 | End: 2022-12-01

## 2022-11-28 RX ORDER — VANCOMYCIN HCL 1 G
1000 VIAL (EA) INTRAVENOUS EVERY 24 HOURS
Refills: 0 | Status: COMPLETED | OUTPATIENT
Start: 2022-11-28 | End: 2022-12-01

## 2022-11-28 RX ORDER — CHLORHEXIDINE GLUCONATE 213 G/1000ML
1 SOLUTION TOPICAL
Refills: 0 | Status: DISCONTINUED | OUTPATIENT
Start: 2022-11-28 | End: 2022-12-07

## 2022-11-28 RX ORDER — SODIUM CHLORIDE 9 MG/ML
1000 INJECTION, SOLUTION INTRAVENOUS ONCE
Refills: 0 | Status: COMPLETED | OUTPATIENT
Start: 2022-11-28 | End: 2022-11-28

## 2022-11-28 RX ORDER — OLANZAPINE 15 MG/1
2.5 TABLET, FILM COATED ORAL ONCE
Refills: 0 | Status: COMPLETED | OUTPATIENT
Start: 2022-11-28 | End: 2022-12-01

## 2022-11-28 RX ADMIN — SODIUM CHLORIDE 1000 MILLILITER(S): 9 INJECTION, SOLUTION INTRAVENOUS at 15:25

## 2022-11-28 RX ADMIN — RIVAROXABAN 15 MILLIGRAM(S): KIT at 05:59

## 2022-11-28 RX ADMIN — CEFEPIME 100 MILLIGRAM(S): 1 INJECTION, POWDER, FOR SOLUTION INTRAMUSCULAR; INTRAVENOUS at 14:51

## 2022-11-28 RX ADMIN — QUETIAPINE FUMARATE 25 MILLIGRAM(S): 200 TABLET, FILM COATED ORAL at 11:18

## 2022-11-28 RX ADMIN — QUETIAPINE FUMARATE 25 MILLIGRAM(S): 200 TABLET, FILM COATED ORAL at 21:06

## 2022-11-28 RX ADMIN — RIVAROXABAN 15 MILLIGRAM(S): KIT at 21:13

## 2022-11-28 RX ADMIN — Medication 250 MILLIGRAM(S): at 14:50

## 2022-11-28 RX ADMIN — Medication 1 MILLIGRAM(S): at 22:10

## 2022-11-28 RX ADMIN — CHLORHEXIDINE GLUCONATE 1 APPLICATION(S): 213 SOLUTION TOPICAL at 17:27

## 2022-11-28 RX ADMIN — Medication 40 MILLIGRAM(S): at 11:18

## 2022-11-28 RX ADMIN — SODIUM CHLORIDE 1000 MILLILITER(S): 9 INJECTION INTRAMUSCULAR; INTRAVENOUS; SUBCUTANEOUS at 13:20

## 2022-11-28 RX ADMIN — Medication 1: at 11:37

## 2022-11-28 RX ADMIN — LOSARTAN POTASSIUM 25 MILLIGRAM(S): 100 TABLET, FILM COATED ORAL at 11:18

## 2022-11-28 RX ADMIN — Medication 3: at 19:28

## 2022-11-28 RX ADMIN — HALOPERIDOL DECANOATE 1 MILLIGRAM(S): 100 INJECTION INTRAMUSCULAR at 21:07

## 2022-11-28 NOTE — CONSULT NOTE ADULT - SUBJECTIVE AND OBJECTIVE BOX
Patient is a 82y old  Female who presents with a chief complaint of     BRIEF HOSPITAL COURSE: 81 y/o F recent admission to  11/11 - 11/24/2022 for management of acute infectious encephalopathy with hypoxic respiratory failure secondary to COVID19 complicated by multifocal PNA, E. coli UTI was BIBA from home for further evaluation and management of multiple lacerations to left forearm with a nail clipper. As per patient's daughter she was attempting to administer the patient's home medications as prescribed when the patient became upset, frustrated, and started cutting her left arm from her left wrist to her left elbow. In the ED the patient was evaluated by Psychiatry. As noted patient has no formal PPHx, and no history of SA/SIB. Vital signs in triage => /76, HR 71/min, RR 16/min, TMax 98.4'F, and SpO2 96% on room air. Labs => Na 134, BUN//Cr 24/1.23, Glu 329, Alb 2.9, HgbA1C 7.9, (+)UA. CT Head => No evidence of acute intracranial hemorrhage or hydrocephalus. Atrophy with white matter ischemic changes. In the ED the patient was given Cephalexin 500mg PO x 1, Ceftriaxone 1g IVPB x 1, Quetiapine 25mg PO x 1, Haloperidol 1mg PO x 1, and NS x 2L. Patient found to have positive UTI with pseudomonas and enterococcus, being treated with CTX.    Events last 24 hours: Rapid response called at approximately 12PM when patient was found unresponsive by RN. Last known well 10 mins prior. On arrival to bedside, BP 60/40s otherwise afebrile and HDS. Started rapid infusion of NS bolus with improvement to SBPs of 80s and mental status improving to mild, intermittent confusion, A&Ox3. STAT repeat labs and cultures sent. Patient broadened from CTX to Vanc and Cefepime for Pseudomonas and Enterococcus UTI. Patient to be upgraded to CCU iso acute decompensation into septic shock.    PAST MEDICAL & SURGICAL HISTORY:  2019 novel coronavirus disease (COVID-19)      E. coli UTI (urinary tract infection)      Moderate aortic stenosis      No significant past surgical history          Review of Systems: Unresponsive at time of examination       Medications:  cefepime   IVPB 2000 milliGRAM(s) IV Intermittent every 12 hours  vancomycin  IVPB 1000 milliGRAM(s) IV Intermittent every 24 hours      albuterol    90 MICROgram(s) HFA Inhaler 2 Puff(s) Inhalation every 6 hours PRN    haloperidol     Tablet 1 milliGRAM(s) Oral every 8 hours PRN  QUEtiapine 25 milliGRAM(s) Oral two times a day      rivaroxaban 15 milliGRAM(s) Oral two times a day        dextrose 50% Injectable 25 Gram(s) IV Push once  dextrose 50% Injectable 12.5 Gram(s) IV Push once  dextrose 50% Injectable 25 Gram(s) IV Push once  dextrose Oral Gel 15 Gram(s) Oral once PRN  glucagon  Injectable 1 milliGRAM(s) IntraMuscular once  insulin lispro (ADMELOG) corrective regimen sliding scale   SubCutaneous three times a day before meals  insulin lispro (ADMELOG) corrective regimen sliding scale   SubCutaneous at bedtime  predniSONE   Tablet 40 milliGRAM(s) Oral daily    dextrose 5%. 1000 milliLiter(s) IV Continuous <Continuous>  dextrose 5%. 1000 milliLiter(s) IV Continuous <Continuous>  lactated ringers Bolus 1000 milliLiter(s) IV Bolus once  sodium chloride 0.9% Bolus 1000 milliLiter(s) IV Bolus once      chlorhexidine 4% Liquid 1 Application(s) Topical <User Schedule>            ICU Vital Signs Last 24 Hrs  T(C): 37 (28 Nov 2022 08:13), Max: 37.3 (28 Nov 2022 06:06)  T(F): 98.6 (28 Nov 2022 08:13), Max: 99.1 (28 Nov 2022 06:06)  HR: 74 (28 Nov 2022 08:13) (70 - 84)  BP: 142/88 (28 Nov 2022 08:13) (141/74 - 161/79)  BP(mean): --  ABP: --  ABP(mean): --  RR: 19 (28 Nov 2022 08:13) (16 - 20)  SpO2: 93% (28 Nov 2022 08:13) (93% - 96%)    O2 Parameters below as of 28 Nov 2022 08:13  Patient On (Oxygen Delivery Method): room air                I&O's Detail        LABS:                        15.4   12.92 )-----------( 225      ( 28 Nov 2022 06:13 )             44.3     11-28    134<L>  |  104  |  16  ----------------------------<  142<H>  4.3   |  20<L>  |  0.86    Ca    8.7      28 Nov 2022 06:13            CAPILLARY BLOOD GLUCOSE      POCT Blood Glucose.: 180 mg/dL (28 Nov 2022 12:06)        CULTURES:  Culture Results:   No growth to date. (11-26-22 @ 17:01)  Culture Results:   No growth to date. (11-26-22 @ 17:01)  Culture Results:   10,000 - 49,000 CFU/mL Enterococcus species  10,000 - 49,000 CFU/mL Pseudomonas aeruginosa (11-26-22 @ 12:40)      Physical Examination:    General: No acute distress.  Unresponsive to verbal commands and questioning, moving all extremities     HEENT: Pupils equal, reactive to light.  Symmetric.    PULM: Breathing comfortably on 2L NC, good BS B/L with no Rales or Rhonchi, no significant sputum production    CVS: Regular rate and rhythm, no murmurs, rubs, or gallops    ABD: BS+ Soft, nondistended, nontender, no masses    EXT: No edema, nontender    SKIN: Warm and well perfused, no rashes noted. Multiple linear lacerations noted to L forearm    RADIOLOGY:     CRITICAL CARE TIME SPENT: 40 mins of time have been spent evaluating, reviewing all available lab and radiologic material, reviewing the EMR and treating this critically ill patient, who has complex medical problems and life threatening organ dysfunction. This also included speaking with the staff, consultants, and family.     Patient is a 82y old  Female who presents with a chief complaint of     BRIEF HOSPITAL COURSE: "83 y/o F recent admission to  11/11 - 11/24/2022 for management of acute infectious encephalopathy with hypoxic respiratory failure secondary to COVID19 complicated by multifocal PNA, E. coli UTI was BIBA from home for further evaluation and management of multiple lacerations to left forearm with a nail clipper. As per patient's daughter she was attempting to administer the patient's home medications as prescribed when the patient became upset, frustrated, and started cutting her left arm from her left wrist to her left elbow. In the ED the patient was evaluated by Psychiatry. As noted patient has no formal PPHx, and no history of SA/SIB. Vital signs in triage => /76, HR 71/min, RR 16/min, TMax 98.4'F, and SpO2 96% on room air. Labs => Na 134, BUN//Cr 24/1.23, Glu 329, Alb 2.9, HgbA1C 7.9, (+)UA. CT Head => No evidence of ICH or hydrocephalus. Atrophy with white matter ischemic changes. In the ED the patient was given Cephalexin 500mg PO x 1, Ceftriaxone 1g IVPB x 1, Quetiapine 25mg PO x 1, Haloperidol 1mg PO x 1, and NS x 2L." Patient found to have positive UTI with pseudomonas and enterococcus, being treated with CTX.    Events last 24 hours: Rapid response called at approximately 12PM when patient was found unresponsive by RN. Last known well 10 mins prior. On arrival to bedside, BP 60/40s otherwise afebrile and HDS. Started rapid infusion of NS bolus with improvement to SBPs of 80s and mental status improving to mild, intermittent confusion, A&Ox3. STAT repeat labs and cultures sent. Patient broadened from CTX to Vanc and Cefepime for Pseudomonas and Enterococcus UTI. Patient to be upgraded to CCU iso acute decompensation into septic shock.    PAST MEDICAL & SURGICAL HISTORY:  2019 novel coronavirus disease (COVID-19)      E. coli UTI (urinary tract infection)      Moderate aortic stenosis      No significant past surgical history          Review of Systems: Unresponsive at time of examination       Medications:  cefepime   IVPB 2000 milliGRAM(s) IV Intermittent every 12 hours  vancomycin  IVPB 1000 milliGRAM(s) IV Intermittent every 24 hours      albuterol    90 MICROgram(s) HFA Inhaler 2 Puff(s) Inhalation every 6 hours PRN    haloperidol     Tablet 1 milliGRAM(s) Oral every 8 hours PRN  QUEtiapine 25 milliGRAM(s) Oral two times a day      rivaroxaban 15 milliGRAM(s) Oral two times a day        dextrose 50% Injectable 25 Gram(s) IV Push once  dextrose 50% Injectable 12.5 Gram(s) IV Push once  dextrose 50% Injectable 25 Gram(s) IV Push once  dextrose Oral Gel 15 Gram(s) Oral once PRN  glucagon  Injectable 1 milliGRAM(s) IntraMuscular once  insulin lispro (ADMELOG) corrective regimen sliding scale   SubCutaneous three times a day before meals  insulin lispro (ADMELOG) corrective regimen sliding scale   SubCutaneous at bedtime  predniSONE   Tablet 40 milliGRAM(s) Oral daily    dextrose 5%. 1000 milliLiter(s) IV Continuous <Continuous>  dextrose 5%. 1000 milliLiter(s) IV Continuous <Continuous>  lactated ringers Bolus 1000 milliLiter(s) IV Bolus once  sodium chloride 0.9% Bolus 1000 milliLiter(s) IV Bolus once      chlorhexidine 4% Liquid 1 Application(s) Topical <User Schedule>            ICU Vital Signs Last 24 Hrs  T(C): 37 (28 Nov 2022 08:13), Max: 37.3 (28 Nov 2022 06:06)  T(F): 98.6 (28 Nov 2022 08:13), Max: 99.1 (28 Nov 2022 06:06)  HR: 74 (28 Nov 2022 08:13) (70 - 84)  BP: 142/88 (28 Nov 2022 08:13) (141/74 - 161/79)  BP(mean): --  ABP: --  ABP(mean): --  RR: 19 (28 Nov 2022 08:13) (16 - 20)  SpO2: 93% (28 Nov 2022 08:13) (93% - 96%)    O2 Parameters below as of 28 Nov 2022 08:13  Patient On (Oxygen Delivery Method): room air                I&O's Detail        LABS:                        15.4   12.92 )-----------( 225      ( 28 Nov 2022 06:13 )             44.3     11-28    134<L>  |  104  |  16  ----------------------------<  142<H>  4.3   |  20<L>  |  0.86    Ca    8.7      28 Nov 2022 06:13            CAPILLARY BLOOD GLUCOSE      POCT Blood Glucose.: 180 mg/dL (28 Nov 2022 12:06)        CULTURES:  Culture Results:   No growth to date. (11-26-22 @ 17:01)  Culture Results:   No growth to date. (11-26-22 @ 17:01)  Culture Results:   10,000 - 49,000 CFU/mL Enterococcus species  10,000 - 49,000 CFU/mL Pseudomonas aeruginosa (11-26-22 @ 12:40)      Physical Examination:    General: No acute distress.  Unresponsive to verbal commands and questioning, moving all extremities     HEENT: Pupils equal, reactive to light.  Symmetric.    PULM: Breathing comfortably on 2L NC, good BS B/L with no Rales or Rhonchi, no significant sputum production    CVS: Regular rate and rhythm, no murmurs, rubs, or gallops    ABD: BS+ Soft, nondistended, nontender, no masses    EXT: No edema, nontender    SKIN: Warm and well perfused, no rashes noted. Multiple linear lacerations noted to L forearm    RADIOLOGY:     CRITICAL CARE TIME SPENT: 40 mins of time have been spent evaluating, reviewing all available lab and radiologic material, reviewing the EMR and treating this critically ill patient, who has complex medical problems and life threatening organ dysfunction. This also included speaking with the staff, consultants, and family.

## 2022-11-28 NOTE — BH CONSULTATION LIAISON PROGRESS NOTE - ORIENTATION
PT does not know the date (December 22 2022) she knows the town, but could not figure out that it is hospital.

## 2022-11-28 NOTE — CONSULT NOTE ADULT - SUBJECTIVE AND OBJECTIVE BOX
Patient is a 82y old  Female who presents with a chief complaint of UTI    HPI:  83 y/o Female with h/o recent admission to  11/11 - 11/24/2022 for severe COVID-19 multifocal pneumonia, encephalopathy with hypoxic respiratory failure treated with remdesivir x 10 days, UTI with E. coli s/p ceftriaxone was admitted on 11/27 for multiple lacerations to left forearm with a nail clipper. As per patient's daughter she was attempting to administer the patient's home medications as prescribed when the patient became upset, frustrated, and started cutting her left arm from her left wrist to her left elbow. In the ED the patient was evaluated by Psychiatry. As noted patient has no formal PPHx, and no history of SA/SIB. Additionally on the patient's prior admission she was noted to have episodes of agitation (requiring prns of Haloperidol and Quetiapine). In the ED the patient was confused awake and alert oriented to person, and place. The patient's daughter reports that the patient has ongoing decline in memory for the last few years and states at times patient leaves the house to go on walks but hasn't gotten lost. The patient's daughter states that since last night and into this morning the patient has been increasingly more agitated, and verbally abusive to her family. On 11/28 she was noted hypotensive and with pyuria. She initially received ceftriaxone, then cefepime and vancomycin IV.     PMH: as above  PSH: as above  Meds: per reconciliation sheet, noted below  MEDICATIONS  (STANDING):  cefepime   IVPB 2000 milliGRAM(s) IV Intermittent every 12 hours  chlorhexidine 4% Liquid 1 Application(s) Topical <User Schedule>  dextrose 5%. 1000 milliLiter(s) (50 mL/Hr) IV Continuous <Continuous>  dextrose 5%. 1000 milliLiter(s) (100 mL/Hr) IV Continuous <Continuous>  dextrose 50% Injectable 25 Gram(s) IV Push once  dextrose 50% Injectable 12.5 Gram(s) IV Push once  dextrose 50% Injectable 25 Gram(s) IV Push once  glucagon  Injectable 1 milliGRAM(s) IntraMuscular once  insulin lispro (ADMELOG) corrective regimen sliding scale   SubCutaneous three times a day before meals  insulin lispro (ADMELOG) corrective regimen sliding scale   SubCutaneous at bedtime  predniSONE   Tablet 40 milliGRAM(s) Oral daily  QUEtiapine 25 milliGRAM(s) Oral two times a day  rivaroxaban 15 milliGRAM(s) Oral two times a day  vancomycin  IVPB 1000 milliGRAM(s) IV Intermittent every 24 hours    MEDICATIONS  (PRN):  albuterol    90 MICROgram(s) HFA Inhaler 2 Puff(s) Inhalation every 6 hours PRN Shortness of Breath and/or Wheezing  dextrose Oral Gel 15 Gram(s) Oral once PRN Blood Glucose LESS THAN 70 milliGRAM(s)/deciliter  haloperidol     Tablet 1 milliGRAM(s) Oral every 8 hours PRN agitation    Allergies    No Known Allergies    Intolerances      Social: no smoking, no alcohol, no illegal drugs; no recent travel, no exposure to TB  FAMILY HISTORY:  No pertinent family history in first degree relatives      no history of premature cardiovascular disease in first degree relatives    ROS: the patient is confused; unobtainable  All other systems reviewed and are negative    Vital Signs Last 24 Hrs  T(C): 35.9 (28 Nov 2022 13:00), Max: 37.3 (28 Nov 2022 06:06)  T(F): 96.6 (28 Nov 2022 13:00), Max: 99.1 (28 Nov 2022 06:06)  HR: 89 (28 Nov 2022 15:03) (66 - 89)  BP: 117/62 (28 Nov 2022 15:03) (112/55 - 161/79)  BP(mean): 73 (28 Nov 2022 15:03) (69 - 73)  RR: 16 (28 Nov 2022 15:03) (16 - 21)  SpO2: 96% (28 Nov 2022 14:00) (93% - 100%)    Parameters below as of 28 Nov 2022 13:00  Patient On (Oxygen Delivery Method): nasal cannula  O2 Flow (L/min): 2    PE:    Constitutional:  No acute distress  HEENT: NC/AT, EOMI, PERRLA, conjunctivae clear; ears and nose atraumatic; pharynx benign  Neck: supple; thyroid not palpable  Back: no tenderness  Respiratory: respiratory effort normal; few crackles at bases  Cardiovascular: S1S2 regular, no murmurs  Abdomen: soft, not tender, not distended, positive BS; no liver or spleen organomegaly  Genitourinary: no suprapubic tenderness  Lymphatic: no LN palpable  Musculoskeletal: no muscle tenderness, no joint swelling or tenderness  Extremities: no pedal edema  Neurological/ Psychiatric: alert, judgement and insight impaired; moving all extremities  Skin: no rashes; no palpable lesions    Labs: all available labs reviewed                        11.8   9.78  )-----------( 219      ( 28 Nov 2022 13:34 )             33.8     11-28    138  |  110<H>  |  17  ----------------------------<  197<H>  3.6   |  21<L>  |  0.91    Ca    8.2<L>      28 Nov 2022 13:34  Phos  2.4     11-28  Mg     2.0     11-28    TPro  6.1  /  Alb  2.4<L>  /  TBili  0.5  /  DBili  x   /  AST  22  /  ALT  34  /  AlkPhos  146<H>  11-28     LIVER FUNCTIONS - ( 28 Nov 2022 13:34 )  Alb: 2.4 g/dL / Pro: 6.1 gm/dL / ALK PHOS: 146 U/L / ALT: 34 U/L / AST: 22 U/L / GGT: x             Culture - Blood (collected 26 Nov 2022 17:01)  Source: .Blood None  Preliminary Report (28 Nov 2022 02:03):    No growth to date.    Culture - Blood (collected 26 Nov 2022 17:01)  Source: .Blood None  Preliminary Report (28 Nov 2022 02:03):    No growth to date.    Culture - Urine (collected 26 Nov 2022 12:40)  Source: Clean Catch None  Preliminary Report (27 Nov 2022 17:04):    10,000 - 49,000 CFU/mL Enterococcus species    10,000 - 49,000 CFU/mL Pseudomonas aeruginosa    Radiology: all available radiological tests reviewed        Advanced directives addressed: full resuscitation

## 2022-11-28 NOTE — PROGRESS NOTE ADULT - ASSESSMENT
A/P: 82 female with hypotension likely due to sepsis and septic shock.  From a posssiblkee UTI      Plan:  CCU  S/P 30 cc/kg  Change antibiotics to cefepime as there is enterococcus and PSA in the urin  Repeat Cultures  PSY Following   Continue Doac

## 2022-11-28 NOTE — PROGRESS NOTE ADULT - ASSESSMENT
#s/p Self Injurious Behaviors/SA resulting in multiple Linear Lacerations to the Left Arm  ~observe on Medicine  ~cont. constant observation  ~Psychiatry consultation appreciated   ~DDx includes Delirium vs Dementia vs MDD  ~cont. Quetiapine 25mg po BID (monitor qtc <500ms on ekg)   ~cont. Haloperidol 1mg PO/IM q8h PRN agitation/severe agitation.  Monitor for QTc<500    ~cont. Melatonin 3mg PO qHS PRN insomnia~f/u am labs including TSH, B12, folate, RPR  ~per Psychiatry minimize use of Benzodiazepines opioids, anticholinergics, or other deliriogenic agents when possible.    *syncopal episode/ lethargy  - hypotension  - RRT - transferred to CCU  - receivd IVF bolus   - mentation resolving as IVF was infusing     #UTI (Infectious Encephalopathy?)  ~f/u PAN C+S- antibiotic optimized to cefepime and Vanco - Pseudomonas/Enterococcus in Urine   ~cont. IV hydration  ~strict I/Os  ~cont. IV abx     #Recent COVID-19  ~cont. Albuterol 90 mcg/inh 2 puffs inhaled every 6 hours, prn  ~cont. Prednisone taper (may be contributing to this patient's presenting symptoms) 10 mg oral tablet: 5 tab(s) PO QD x 1 days, 4 tab(s) PO QD x 3 days, 3 tab(s) PO  QD x 3 days, 2 tab(s) PO  QD x 3 days, 1 tab(s) PO  QD x 3  days  ~cont. Xarelto Starter Pack 15 mg-20 mg oral kit: 1 tab(s) orally 2 times a day     # HTN   started losartan- held due to hypotension     *DM- ISS  - ADA diet      #Vte ppx  ~cont. Xarelto Starter Pack 15 mg-20 mg oral kit: 1 tab(s) orally 2 times a day

## 2022-11-28 NOTE — CONSULT NOTE ADULT - ASSESSMENT
83 y/o Female with h/o recent admission to  11/11 - 11/24/2022 for severe COVID-19 multifocal pneumonia, encephalopathy with hypoxic respiratory failure treated with remdesivir x 10 days, UTI with E. coli s/p ceftriaxone was admitted on 11/27 for multiple lacerations to left forearm with a nail clipper. As per patient's daughter she was attempting to administer the patient's home medications as prescribed when the patient became upset, frustrated, and started cutting her left arm from her left wrist to her left elbow. In the ED the patient was evaluated by Psychiatry. As noted patient has no formal PPHx, and no history of SA/SIB. Additionally on the patient's prior admission she was noted to have episodes of agitation (requiring prns of Haloperidol and Quetiapine). In the ED the patient was confused awake and alert oriented to person, and place. The patient's daughter reports that the patient has ongoing decline in memory for the last few years and states at times patient leaves the house to go on walks but hasn't gotten lost. The patient's daughter states that since last night and into this morning the patient has been increasingly more agitated, and verbally abusive to her family. On 11/28 she was noted hypotensive and with pyuria. She initially received ceftriaxone, then cefepime and vancomycin IV.     1. Hypotension. Possible sepsis. Pyuria. Probable UTI with PSAE and ENFA. Recent COVID-19 viral syndrome with respiratory failure. Recent UTI with E.coli. Encephalopathy.  -low grade fever  -urine c/s and BC x 2   -agree with cefepime 2 gm IV q12h and vancomycin 1 gm IV qd  -reason for abx use and side effects reviewed with patient; monitor BMP and vancomycin trough levels   -f/u cultures  -respiratory care  -monitor temps  -f/u CBC  -supportive care  2. Other issues:   -care per medicine

## 2022-11-28 NOTE — CONSULT NOTE ADULT - ASSESSMENT
Rapid Response Summary: Rapid response called at approximately 12PM when patient was found unresponsive by RN. Last known well 10 mins prior. On arrival to bedside, BP 60/40s otherwise afebrile and HDS. Started rapid infusion of NS bolus with improvement to SBPs of 80s and mental status improving to mild, intermittent confusion, A&Ox3. STAT repeat labs and cultures sent. Patient broadened from CTX to Vanc and Cefepime for Pseudomonas and Enterococcus UTI. Patient to be upgraded to CCU iso acute decompensation into septic shock.    Septic Shock, s/t UTI  Metabolic Encephalopathy  Evidence of self injurious behaviors (Multiple linear lacerations to L arm)    Plan:  - Will complete 30 cc/kg fluid challenge, if no improvement in blood pressure will initiate levophed for goal MAP >65  - F/U STAT labs, trend lactate  - Broaden antibiotics to Vanc and Cefepime to cover for Pseudomonas and Enterococcus UTI  - Resend STAT pan cultures and narrow abx when appropriate  - Goal UOP >0.5 cc/kg/hr  - Mentation gradually improving with rapid fluid administration, hold off on further imaging for now  - Continue 1:1 for evidence of self-injurious behavior, psychiatry following    Dispo: Patient has acutely decompensated into septic shock s/t UTI. Will require upgrade to CCU for further resuscitation and possible initiation of vasopressor support.    Case discussed with ICU Attending, Dr Bowen.   Rapid Response Summary: Rapid response called at approximately 12PM when patient was found unresponsive by RN. Last known well 10 mins prior. On arrival to bedside, BP 60/40s otherwise afebrile and HDS. Started rapid infusion of NS bolus with improvement to SBPs of 80s and mental status improving to mild, intermittent confusion, A&Ox3. STAT repeat labs and cultures sent. Patient broadened from CTX to Vanc and Cefepime for Pseudomonas and Enterococcus UTI. Patient to be upgraded to CCU iso acute decompensation into septic shock.    Septic Shock, s/t UTI  Metabolic Encephalopathy iso urosepsis  Evidence of self injurious behaviors (Multiple linear lacerations to L arm)    Plan:  - Will complete 30 cc/kg fluid challenge, if no improvement in blood pressure will initiate levophed for goal MAP >65  - F/U STAT labs, trend lactate  - Broaden antibiotics from CTX to Vanc and Cefepime to cover for Pseudomonas and Enterococcus UTI  - Resend STAT pan cultures and narrow abx when appropriate  - Goal UOP >0.5 cc/kg/hr  - Mentation gradually improving with rapid fluid administration, hold off on further imaging for now  - Continue 1:1 for evidence of self-injurious behavior, psychiatry following    Dispo: Patient has acutely decompensated into septic shock s/t UTI. Will require upgrade to CCU for further resuscitation and possible initiation of vasopressor support.    Case discussed with ICU Attending, Dr Bowen.

## 2022-11-29 DIAGNOSIS — N39.0 URINARY TRACT INFECTION, SITE NOT SPECIFIED: ICD-10-CM

## 2022-11-29 LAB
ANION GAP SERPL CALC-SCNC: 7 MMOL/L — SIGNIFICANT CHANGE UP (ref 5–17)
BUN SERPL-MCNC: 18 MG/DL — SIGNIFICANT CHANGE UP (ref 7–23)
CALCIUM SERPL-MCNC: 8.6 MG/DL — SIGNIFICANT CHANGE UP (ref 8.5–10.1)
CHLORIDE SERPL-SCNC: 112 MMOL/L — HIGH (ref 96–108)
CO2 SERPL-SCNC: 23 MMOL/L — SIGNIFICANT CHANGE UP (ref 22–31)
CREAT SERPL-MCNC: 0.9 MG/DL — SIGNIFICANT CHANGE UP (ref 0.5–1.3)
EGFR: 63 ML/MIN/1.73M2 — SIGNIFICANT CHANGE UP
GLUCOSE SERPL-MCNC: 176 MG/DL — HIGH (ref 70–99)
HCT VFR BLD CALC: 37.3 % — SIGNIFICANT CHANGE UP (ref 34.5–45)
HGB BLD-MCNC: 12.3 G/DL — SIGNIFICANT CHANGE UP (ref 11.5–15.5)
MAGNESIUM SERPL-MCNC: 2.2 MG/DL — SIGNIFICANT CHANGE UP (ref 1.6–2.6)
MCHC RBC-ENTMCNC: 27.8 PG — SIGNIFICANT CHANGE UP (ref 27–34)
MCHC RBC-ENTMCNC: 33 GM/DL — SIGNIFICANT CHANGE UP (ref 32–36)
MCV RBC AUTO: 84.4 FL — SIGNIFICANT CHANGE UP (ref 80–100)
PHOSPHATE SERPL-MCNC: 2.8 MG/DL — SIGNIFICANT CHANGE UP (ref 2.5–4.5)
PLATELET # BLD AUTO: 196 K/UL — SIGNIFICANT CHANGE UP (ref 150–400)
POTASSIUM SERPL-MCNC: 3.8 MMOL/L — SIGNIFICANT CHANGE UP (ref 3.5–5.3)
POTASSIUM SERPL-SCNC: 3.8 MMOL/L — SIGNIFICANT CHANGE UP (ref 3.5–5.3)
RBC # BLD: 4.42 M/UL — SIGNIFICANT CHANGE UP (ref 3.8–5.2)
RBC # FLD: 14.4 % — SIGNIFICANT CHANGE UP (ref 10.3–14.5)
SODIUM SERPL-SCNC: 142 MMOL/L — SIGNIFICANT CHANGE UP (ref 135–145)
WBC # BLD: 10.09 K/UL — SIGNIFICANT CHANGE UP (ref 3.8–10.5)
WBC # FLD AUTO: 10.09 K/UL — SIGNIFICANT CHANGE UP (ref 3.8–10.5)

## 2022-11-29 PROCEDURE — 99291 CRITICAL CARE FIRST HOUR: CPT

## 2022-11-29 PROCEDURE — 99231 SBSQ HOSP IP/OBS SF/LOW 25: CPT

## 2022-11-29 RX ORDER — QUETIAPINE FUMARATE 200 MG/1
25 TABLET, FILM COATED ORAL EVERY 6 HOURS
Refills: 0 | Status: DISCONTINUED | OUTPATIENT
Start: 2022-11-29 | End: 2022-12-12

## 2022-11-29 RX ORDER — OLANZAPINE 15 MG/1
2.5 TABLET, FILM COATED ORAL EVERY 6 HOURS
Refills: 0 | Status: DISCONTINUED | OUTPATIENT
Start: 2022-11-29 | End: 2022-12-03

## 2022-11-29 RX ADMIN — Medication 250 MILLIGRAM(S): at 13:01

## 2022-11-29 RX ADMIN — CEFEPIME 100 MILLIGRAM(S): 1 INJECTION, POWDER, FOR SOLUTION INTRAMUSCULAR; INTRAVENOUS at 10:22

## 2022-11-29 RX ADMIN — Medication 1: at 14:10

## 2022-11-29 RX ADMIN — QUETIAPINE FUMARATE 25 MILLIGRAM(S): 200 TABLET, FILM COATED ORAL at 21:59

## 2022-11-29 RX ADMIN — CEFEPIME 100 MILLIGRAM(S): 1 INJECTION, POWDER, FOR SOLUTION INTRAMUSCULAR; INTRAVENOUS at 01:00

## 2022-11-29 RX ADMIN — QUETIAPINE FUMARATE 25 MILLIGRAM(S): 200 TABLET, FILM COATED ORAL at 10:22

## 2022-11-29 RX ADMIN — CEFEPIME 100 MILLIGRAM(S): 1 INJECTION, POWDER, FOR SOLUTION INTRAMUSCULAR; INTRAVENOUS at 21:59

## 2022-11-29 RX ADMIN — Medication 40 MILLIGRAM(S): at 10:21

## 2022-11-29 RX ADMIN — RIVAROXABAN 15 MILLIGRAM(S): KIT at 10:21

## 2022-11-29 RX ADMIN — Medication 1: at 18:01

## 2022-11-29 RX ADMIN — RIVAROXABAN 15 MILLIGRAM(S): KIT at 17:49

## 2022-11-29 RX ADMIN — CHLORHEXIDINE GLUCONATE 1 APPLICATION(S): 213 SOLUTION TOPICAL at 10:22

## 2022-11-29 NOTE — PROGRESS NOTE ADULT - ASSESSMENT
81 y/o Female with h/o recent admission to  11/11 - 11/24/2022 for severe COVID-19 multifocal pneumonia, encephalopathy with hypoxic respiratory failure treated with remdesivir x 10 days, UTI with E. coli s/p ceftriaxone was admitted on 11/27 for multiple lacerations to left forearm with a nail clipper. As per patient's daughter she was attempting to administer the patient's home medications as prescribed when the patient became upset, frustrated, and started cutting her left arm from her left wrist to her left elbow. In the ED the patient was evaluated by Psychiatry. As noted patient has no formal PPHx, and no history of SA/SIB. Additionally on the patient's prior admission she was noted to have episodes of agitation (requiring prns of Haloperidol and Quetiapine). In the ED the patient was confused awake and alert oriented to person, and place. The patient's daughter reports that the patient has ongoing decline in memory for the last few years and states at times patient leaves the house to go on walks but hasn't gotten lost. The patient's daughter states that since last night and into this morning the patient has been increasingly more agitated, and verbally abusive to her family. On 11/28 she was noted hypotensive and with pyuria. She initially received ceftriaxone, then cefepime and vancomycin IV.     1. Hypotension resolving. Doubt sepsis. Pyuria. Probable UTI with PSAE and ENFA. Recent COVID-19 viral syndrome with respiratory failure. Recent UTI with E.coli. Encephalopathy.  -low grade fever resolved  -urine c/s and BC x 2 noted   -on cefepime 2 gm IV q12h and vancomycin 1 gm IV qd # 2  -tolerating abx well so far; no side effects noted  -obtain vancomycin trough level  -CXR shows improving interstitial infiltrates   -f/u cultures  -continue abx coverage for now  -respiratory care  -monitor temps  -f/u CBC  -supportive care  2. Other issues:   -care per medicine

## 2022-11-29 NOTE — BH CONSULTATION LIAISON PROGRESS NOTE - OTHER
linear scratches  on left volar aspect of pt arm. 
healing linear scratches  on left volar aspect of pt arm.

## 2022-11-29 NOTE — PROGRESS NOTE ADULT - ASSESSMENT
81 y/o female with pmhx of moderate AS, recent covid-19 and ecoli uti BIBA due to multiple lacerateions on arm from nail clipper admitted for further psych eval. periods of agitation at home and in hospital.     1. UTI due to pseudomonas  2. delirium    - f/u repet cultures. on cefepime and vanc. ID following.  - bp stable  - prn antipsychotics. psych following  - continue 1:1

## 2022-11-30 LAB
ANION GAP SERPL CALC-SCNC: 7 MMOL/L — SIGNIFICANT CHANGE UP (ref 5–17)
BASOPHILS # BLD AUTO: 0.02 K/UL — SIGNIFICANT CHANGE UP (ref 0–0.2)
BASOPHILS NFR BLD AUTO: 0.2 % — SIGNIFICANT CHANGE UP (ref 0–2)
BUN SERPL-MCNC: 18 MG/DL — SIGNIFICANT CHANGE UP (ref 7–23)
CALCIUM SERPL-MCNC: 8.3 MG/DL — LOW (ref 8.5–10.1)
CHLORIDE SERPL-SCNC: 108 MMOL/L — SIGNIFICANT CHANGE UP (ref 96–108)
CO2 SERPL-SCNC: 24 MMOL/L — SIGNIFICANT CHANGE UP (ref 22–31)
CREAT SERPL-MCNC: 0.79 MG/DL — SIGNIFICANT CHANGE UP (ref 0.5–1.3)
EGFR: 74 ML/MIN/1.73M2 — SIGNIFICANT CHANGE UP
EOSINOPHIL # BLD AUTO: 0.23 K/UL — SIGNIFICANT CHANGE UP (ref 0–0.5)
EOSINOPHIL NFR BLD AUTO: 2.5 % — SIGNIFICANT CHANGE UP (ref 0–6)
GLUCOSE SERPL-MCNC: 178 MG/DL — HIGH (ref 70–99)
HCT VFR BLD CALC: 40.5 % — SIGNIFICANT CHANGE UP (ref 34.5–45)
HGB BLD-MCNC: 13.7 G/DL — SIGNIFICANT CHANGE UP (ref 11.5–15.5)
IMM GRANULOCYTES NFR BLD AUTO: 1.1 % — HIGH (ref 0–0.9)
LYMPHOCYTES # BLD AUTO: 1.49 K/UL — SIGNIFICANT CHANGE UP (ref 1–3.3)
LYMPHOCYTES # BLD AUTO: 16 % — SIGNIFICANT CHANGE UP (ref 13–44)
MAGNESIUM SERPL-MCNC: 2.2 MG/DL — SIGNIFICANT CHANGE UP (ref 1.6–2.6)
MCHC RBC-ENTMCNC: 28.4 PG — SIGNIFICANT CHANGE UP (ref 27–34)
MCHC RBC-ENTMCNC: 33.8 GM/DL — SIGNIFICANT CHANGE UP (ref 32–36)
MCV RBC AUTO: 84 FL — SIGNIFICANT CHANGE UP (ref 80–100)
MONOCYTES # BLD AUTO: 0.61 K/UL — SIGNIFICANT CHANGE UP (ref 0–0.9)
MONOCYTES NFR BLD AUTO: 6.6 % — SIGNIFICANT CHANGE UP (ref 2–14)
NEUTROPHILS # BLD AUTO: 6.86 K/UL — SIGNIFICANT CHANGE UP (ref 1.8–7.4)
NEUTROPHILS NFR BLD AUTO: 73.6 % — SIGNIFICANT CHANGE UP (ref 43–77)
PHOSPHATE SERPL-MCNC: 2.8 MG/DL — SIGNIFICANT CHANGE UP (ref 2.5–4.5)
PLATELET # BLD AUTO: 198 K/UL — SIGNIFICANT CHANGE UP (ref 150–400)
POTASSIUM SERPL-MCNC: 3.6 MMOL/L — SIGNIFICANT CHANGE UP (ref 3.5–5.3)
POTASSIUM SERPL-SCNC: 3.6 MMOL/L — SIGNIFICANT CHANGE UP (ref 3.5–5.3)
RBC # BLD: 4.82 M/UL — SIGNIFICANT CHANGE UP (ref 3.8–5.2)
RBC # FLD: 14.7 % — HIGH (ref 10.3–14.5)
SODIUM SERPL-SCNC: 139 MMOL/L — SIGNIFICANT CHANGE UP (ref 135–145)
VANCOMYCIN TROUGH SERPL-MCNC: 6.7 UG/ML — LOW (ref 10–20)
WBC # BLD: 9.31 K/UL — SIGNIFICANT CHANGE UP (ref 3.8–10.5)
WBC # FLD AUTO: 9.31 K/UL — SIGNIFICANT CHANGE UP (ref 3.8–10.5)

## 2022-11-30 PROCEDURE — 99233 SBSQ HOSP IP/OBS HIGH 50: CPT

## 2022-11-30 RX ADMIN — Medication 30 MILLIGRAM(S): at 10:21

## 2022-11-30 RX ADMIN — QUETIAPINE FUMARATE 25 MILLIGRAM(S): 200 TABLET, FILM COATED ORAL at 21:01

## 2022-11-30 RX ADMIN — CEFEPIME 100 MILLIGRAM(S): 1 INJECTION, POWDER, FOR SOLUTION INTRAMUSCULAR; INTRAVENOUS at 10:22

## 2022-11-30 RX ADMIN — Medication 2: at 09:04

## 2022-11-30 RX ADMIN — Medication 1: at 12:02

## 2022-11-30 RX ADMIN — Medication 3: at 16:39

## 2022-11-30 RX ADMIN — RIVAROXABAN 15 MILLIGRAM(S): KIT at 10:21

## 2022-11-30 RX ADMIN — Medication 250 MILLIGRAM(S): at 12:02

## 2022-11-30 RX ADMIN — QUETIAPINE FUMARATE 25 MILLIGRAM(S): 200 TABLET, FILM COATED ORAL at 10:22

## 2022-11-30 RX ADMIN — CEFEPIME 100 MILLIGRAM(S): 1 INJECTION, POWDER, FOR SOLUTION INTRAMUSCULAR; INTRAVENOUS at 21:01

## 2022-11-30 RX ADMIN — OLANZAPINE 2.5 MILLIGRAM(S): 15 TABLET, FILM COATED ORAL at 23:55

## 2022-11-30 RX ADMIN — RIVAROXABAN 15 MILLIGRAM(S): KIT at 21:00

## 2022-11-30 NOTE — PROGRESS NOTE ADULT - ASSESSMENT
83 y/o Female with h/o recent admission to  11/11 - 11/24/2022 for severe COVID-19 multifocal pneumonia, encephalopathy with hypoxic respiratory failure treated with remdesivir x 10 days, UTI with E. coli s/p ceftriaxone was admitted on 11/27 for multiple lacerations to left forearm with a nail clipper. As per patient's daughter she was attempting to administer the patient's home medications as prescribed when the patient became upset, frustrated, and started cutting her left arm from her left wrist to her left elbow. In the ED the patient was evaluated by Psychiatry. As noted patient has no formal PPHx, and no history of SA/SIB. Additionally on the patient's prior admission she was noted to have episodes of agitation (requiring prns of Haloperidol and Quetiapine). In the ED the patient was confused awake and alert oriented to person, and place. The patient's daughter reports that the patient has ongoing decline in memory for the last few years and states at times patient leaves the house to go on walks but hasn't gotten lost. The patient's daughter states that since last night and into this morning the patient has been increasingly more agitated, and verbally abusive to her family. On 11/28 she was noted hypotensive and with pyuria. She initially received ceftriaxone, then cefepime and vancomycin IV.     1. Hypotension resolving. Doubt sepsis. Pyuria. Probable UTI with PSAE and ENFA. Recent COVID-19 viral syndrome with respiratory failure. Recent UTI with E.coli. Encephalopathy.  -low grade fever resolved  -urine c/s and BC x 2 noted   -on cefepime 2 gm IV q12h and vancomycin 1 gm IV qd # 3  -tolerating abx well so far; no side effects noted  -obtain vancomycin trough level  -CXR shows improving interstitial infiltrates   -f/u cultures  -continue abx coverage for one more day  -respiratory care  -monitor temps  -f/u CBC  -supportive care  2. Other issues:   -care per medicine

## 2022-11-30 NOTE — PROGRESS NOTE ADULT - RESPIRATORY
clear to auscultation bilaterally/no wheezes/no rales/no rhonchi
respirations non-labored
airway patent/good air movement/respirations non-labored

## 2022-12-01 DIAGNOSIS — U07.1 COVID-19: ICD-10-CM

## 2022-12-01 DIAGNOSIS — N39.0 URINARY TRACT INFECTION, SITE NOT SPECIFIED: ICD-10-CM

## 2022-12-01 DIAGNOSIS — R00.1 BRADYCARDIA, UNSPECIFIED: ICD-10-CM

## 2022-12-01 DIAGNOSIS — R11.2 NAUSEA WITH VOMITING, UNSPECIFIED: ICD-10-CM

## 2022-12-01 DIAGNOSIS — J96.01 ACUTE RESPIRATORY FAILURE WITH HYPOXIA: ICD-10-CM

## 2022-12-01 DIAGNOSIS — R94.6 ABNORMAL RESULTS OF THYROID FUNCTION STUDIES: ICD-10-CM

## 2022-12-01 DIAGNOSIS — R53.1 WEAKNESS: ICD-10-CM

## 2022-12-01 DIAGNOSIS — B96.20 UNSPECIFIED ESCHERICHIA COLI [E. COLI] AS THE CAUSE OF DISEASES CLASSIFIED ELSEWHERE: ICD-10-CM

## 2022-12-01 DIAGNOSIS — Z28.310 UNVACCINATED FOR COVID-19: ICD-10-CM

## 2022-12-01 DIAGNOSIS — G93.41 METABOLIC ENCEPHALOPATHY: ICD-10-CM

## 2022-12-01 DIAGNOSIS — I35.0 NONRHEUMATIC AORTIC (VALVE) STENOSIS: ICD-10-CM

## 2022-12-01 DIAGNOSIS — F05 DELIRIUM DUE TO KNOWN PHYSIOLOGICAL CONDITION: ICD-10-CM

## 2022-12-01 DIAGNOSIS — R79.1 ABNORMAL COAGULATION PROFILE: ICD-10-CM

## 2022-12-01 DIAGNOSIS — E86.0 DEHYDRATION: ICD-10-CM

## 2022-12-01 DIAGNOSIS — J12.82 PNEUMONIA DUE TO CORONAVIRUS DISEASE 2019: ICD-10-CM

## 2022-12-01 PROCEDURE — 99233 SBSQ HOSP IP/OBS HIGH 50: CPT

## 2022-12-01 RX ADMIN — RIVAROXABAN 15 MILLIGRAM(S): KIT at 17:36

## 2022-12-01 RX ADMIN — OLANZAPINE 2.5 MILLIGRAM(S): 15 TABLET, FILM COATED ORAL at 14:27

## 2022-12-01 RX ADMIN — QUETIAPINE FUMARATE 25 MILLIGRAM(S): 200 TABLET, FILM COATED ORAL at 21:52

## 2022-12-01 RX ADMIN — CEFEPIME 100 MILLIGRAM(S): 1 INJECTION, POWDER, FOR SOLUTION INTRAMUSCULAR; INTRAVENOUS at 10:37

## 2022-12-01 RX ADMIN — Medication 30 MILLIGRAM(S): at 10:38

## 2022-12-01 RX ADMIN — CEFEPIME 100 MILLIGRAM(S): 1 INJECTION, POWDER, FOR SOLUTION INTRAMUSCULAR; INTRAVENOUS at 21:57

## 2022-12-01 RX ADMIN — QUETIAPINE FUMARATE 25 MILLIGRAM(S): 200 TABLET, FILM COATED ORAL at 10:38

## 2022-12-01 RX ADMIN — Medication 250 MILLIGRAM(S): at 12:50

## 2022-12-01 RX ADMIN — QUETIAPINE FUMARATE 25 MILLIGRAM(S): 200 TABLET, FILM COATED ORAL at 21:51

## 2022-12-01 NOTE — PHYSICAL THERAPY INITIAL EVALUATION ADULT - PERTINENT HX OF CURRENT PROBLEM, REHAB EVAL
recent admission to  11/11 - 11/24/2022 for management of acute infectious encephalopathy with hypoxic respiratory failure secondary to COVID19 complicated by multifocal PNA, E. coli UTI was BIBA from home for further evaluation and management of multiple lacerations to left forearm with a nail clipper. As per patient's daughter she was attempting to administer the patient's home medications as prescribed when the patient became upset, frustrated, and started cutting her left arm from her left wrist to her left elbow. In the ED the patient was evaluated by Psychiatry. pt w/ inc agitation PTA. 11/28:  Rapid response for hypotension and syncope.  Patient transported to the CCU for hypotension . now stable.

## 2022-12-01 NOTE — PHYSICAL THERAPY INITIAL EVALUATION ADULT - LEVEL OF INDEPENDENCE: GAIT, REHAB EVAL
CG/CS w/o AD, little unsteady, short step length, pt then putting arm around PT's waist-> steadier gait/moderate assist (50% patients effort)

## 2022-12-01 NOTE — PROGRESS NOTE ADULT - ASSESSMENT
83 y/o Female with h/o recent admission to  11/11 - 11/24/2022 for severe COVID-19 multifocal pneumonia, encephalopathy with hypoxic respiratory failure treated with remdesivir x 10 days, UTI with E. coli s/p ceftriaxone was admitted on 11/27 for multiple lacerations to left forearm with a nail clipper. As per patient's daughter she was attempting to administer the patient's home medications as prescribed when the patient became upset, frustrated, and started cutting her left arm from her left wrist to her left elbow. In the ED the patient was evaluated by Psychiatry. As noted patient has no formal PPHx, and no history of SA/SIB. Additionally on the patient's prior admission she was noted to have episodes of agitation (requiring prns of Haloperidol and Quetiapine). In the ED the patient was confused awake and alert oriented to person, and place. The patient's daughter reports that the patient has ongoing decline in memory for the last few years and states at times patient leaves the house to go on walks but hasn't gotten lost. The patient's daughter states that since last night and into this morning the patient has been increasingly more agitated, and verbally abusive to her family. On 11/28 she was noted hypotensive and with pyuria. She initially received ceftriaxone, then cefepime and vancomycin IV.     1. Hypotension resolving. Doubt sepsis. Pyuria. Probable UTI with PSAE and ENFA. Recent COVID-19 viral syndrome with respiratory failure. Recent UTI with E.coli. Encephalopathy.  -low grade fever resolved  -urine c/s and BC x 2 noted   -on cefepime 2 gm IV q12h and vancomycin 1 gm IV qd # 3-4  -tolerating abx well so far; no side effects noted  -obtain vancomycin trough level  -CXR shows improving interstitial infiltrates   -f/u cultures  -complete abx therapy today  -respiratory care  -monitor temps  -f/u CBC  -supportive care  2. Other issues:   -care per medicine

## 2022-12-02 LAB
ANION GAP SERPL CALC-SCNC: 11 MMOL/L — SIGNIFICANT CHANGE UP (ref 5–17)
BUN SERPL-MCNC: 26 MG/DL — HIGH (ref 7–23)
CALCIUM SERPL-MCNC: 9 MG/DL — SIGNIFICANT CHANGE UP (ref 8.5–10.1)
CHLORIDE SERPL-SCNC: 107 MMOL/L — SIGNIFICANT CHANGE UP (ref 96–108)
CO2 SERPL-SCNC: 22 MMOL/L — SIGNIFICANT CHANGE UP (ref 22–31)
CREAT SERPL-MCNC: 1.14 MG/DL — SIGNIFICANT CHANGE UP (ref 0.5–1.3)
CULTURE RESULTS: SIGNIFICANT CHANGE UP
CULTURE RESULTS: SIGNIFICANT CHANGE UP
EGFR: 48 ML/MIN/1.73M2 — LOW
GLUCOSE SERPL-MCNC: 212 MG/DL — HIGH (ref 70–99)
HCT VFR BLD CALC: 38.4 % — SIGNIFICANT CHANGE UP (ref 34.5–45)
HGB BLD-MCNC: 12.9 G/DL — SIGNIFICANT CHANGE UP (ref 11.5–15.5)
MCHC RBC-ENTMCNC: 28.2 PG — SIGNIFICANT CHANGE UP (ref 27–34)
MCHC RBC-ENTMCNC: 33.6 GM/DL — SIGNIFICANT CHANGE UP (ref 32–36)
MCV RBC AUTO: 84 FL — SIGNIFICANT CHANGE UP (ref 80–100)
PLATELET # BLD AUTO: 159 K/UL — SIGNIFICANT CHANGE UP (ref 150–400)
POTASSIUM SERPL-MCNC: 3.6 MMOL/L — SIGNIFICANT CHANGE UP (ref 3.5–5.3)
POTASSIUM SERPL-SCNC: 3.6 MMOL/L — SIGNIFICANT CHANGE UP (ref 3.5–5.3)
RBC # BLD: 4.57 M/UL — SIGNIFICANT CHANGE UP (ref 3.8–5.2)
RBC # FLD: 14.5 % — SIGNIFICANT CHANGE UP (ref 10.3–14.5)
SODIUM SERPL-SCNC: 140 MMOL/L — SIGNIFICANT CHANGE UP (ref 135–145)
SPECIMEN SOURCE: SIGNIFICANT CHANGE UP
SPECIMEN SOURCE: SIGNIFICANT CHANGE UP
WBC # BLD: 12.45 K/UL — HIGH (ref 3.8–10.5)
WBC # FLD AUTO: 12.45 K/UL — HIGH (ref 3.8–10.5)

## 2022-12-02 PROCEDURE — 99233 SBSQ HOSP IP/OBS HIGH 50: CPT

## 2022-12-02 PROCEDURE — 93010 ELECTROCARDIOGRAM REPORT: CPT

## 2022-12-02 RX ORDER — POLYETHYLENE GLYCOL 3350 17 G/17G
17 POWDER, FOR SOLUTION ORAL ONCE
Refills: 0 | Status: COMPLETED | OUTPATIENT
Start: 2022-12-02 | End: 2022-12-02

## 2022-12-02 RX ORDER — SODIUM CHLORIDE 9 MG/ML
1000 INJECTION INTRAMUSCULAR; INTRAVENOUS; SUBCUTANEOUS
Refills: 0 | Status: COMPLETED | OUTPATIENT
Start: 2022-12-02 | End: 2022-12-02

## 2022-12-02 RX ADMIN — Medication 1: at 13:33

## 2022-12-02 RX ADMIN — SODIUM CHLORIDE 125 MILLILITER(S): 9 INJECTION INTRAMUSCULAR; INTRAVENOUS; SUBCUTANEOUS at 23:35

## 2022-12-02 RX ADMIN — RIVAROXABAN 15 MILLIGRAM(S): KIT at 09:06

## 2022-12-02 RX ADMIN — QUETIAPINE FUMARATE 25 MILLIGRAM(S): 200 TABLET, FILM COATED ORAL at 20:46

## 2022-12-02 RX ADMIN — CHLORHEXIDINE GLUCONATE 1 APPLICATION(S): 213 SOLUTION TOPICAL at 08:44

## 2022-12-02 RX ADMIN — OLANZAPINE 2.5 MILLIGRAM(S): 15 TABLET, FILM COATED ORAL at 01:54

## 2022-12-02 RX ADMIN — Medication 1: at 17:20

## 2022-12-02 RX ADMIN — Medication 1: at 08:43

## 2022-12-02 RX ADMIN — QUETIAPINE FUMARATE 25 MILLIGRAM(S): 200 TABLET, FILM COATED ORAL at 11:08

## 2022-12-02 RX ADMIN — RIVAROXABAN 15 MILLIGRAM(S): KIT at 20:46

## 2022-12-02 RX ADMIN — POLYETHYLENE GLYCOL 3350 17 GRAM(S): 17 POWDER, FOR SOLUTION ORAL at 20:46

## 2022-12-03 LAB
ANION GAP SERPL CALC-SCNC: 10 MMOL/L — SIGNIFICANT CHANGE UP (ref 5–17)
BUN SERPL-MCNC: 26 MG/DL — HIGH (ref 7–23)
CALCIUM SERPL-MCNC: 9 MG/DL — SIGNIFICANT CHANGE UP (ref 8.5–10.1)
CHLORIDE SERPL-SCNC: 112 MMOL/L — HIGH (ref 96–108)
CO2 SERPL-SCNC: 21 MMOL/L — LOW (ref 22–31)
CREAT SERPL-MCNC: 1.04 MG/DL — SIGNIFICANT CHANGE UP (ref 0.5–1.3)
CULTURE RESULTS: SIGNIFICANT CHANGE UP
CULTURE RESULTS: SIGNIFICANT CHANGE UP
EGFR: 53 ML/MIN/1.73M2 — LOW
GLUCOSE SERPL-MCNC: 182 MG/DL — HIGH (ref 70–99)
HCT VFR BLD CALC: 38.9 % — SIGNIFICANT CHANGE UP (ref 34.5–45)
HGB BLD-MCNC: 13.1 G/DL — SIGNIFICANT CHANGE UP (ref 11.5–15.5)
MCHC RBC-ENTMCNC: 28.2 PG — SIGNIFICANT CHANGE UP (ref 27–34)
MCHC RBC-ENTMCNC: 33.7 GM/DL — SIGNIFICANT CHANGE UP (ref 32–36)
MCV RBC AUTO: 83.8 FL — SIGNIFICANT CHANGE UP (ref 80–100)
PLATELET # BLD AUTO: 183 K/UL — SIGNIFICANT CHANGE UP (ref 150–400)
POTASSIUM SERPL-MCNC: 3.6 MMOL/L — SIGNIFICANT CHANGE UP (ref 3.5–5.3)
POTASSIUM SERPL-SCNC: 3.6 MMOL/L — SIGNIFICANT CHANGE UP (ref 3.5–5.3)
RBC # BLD: 4.64 M/UL — SIGNIFICANT CHANGE UP (ref 3.8–5.2)
RBC # FLD: 14.6 % — HIGH (ref 10.3–14.5)
SODIUM SERPL-SCNC: 143 MMOL/L — SIGNIFICANT CHANGE UP (ref 135–145)
SPECIMEN SOURCE: SIGNIFICANT CHANGE UP
SPECIMEN SOURCE: SIGNIFICANT CHANGE UP
WBC # BLD: 16.12 K/UL — HIGH (ref 3.8–10.5)
WBC # FLD AUTO: 16.12 K/UL — HIGH (ref 3.8–10.5)

## 2022-12-03 PROCEDURE — 99232 SBSQ HOSP IP/OBS MODERATE 35: CPT

## 2022-12-03 RX ADMIN — Medication 20 MILLIGRAM(S): at 09:19

## 2022-12-03 RX ADMIN — Medication 1: at 08:25

## 2022-12-03 RX ADMIN — OLANZAPINE 2.5 MILLIGRAM(S): 15 TABLET, FILM COATED ORAL at 01:57

## 2022-12-03 RX ADMIN — Medication 2: at 17:24

## 2022-12-03 RX ADMIN — QUETIAPINE FUMARATE 25 MILLIGRAM(S): 200 TABLET, FILM COATED ORAL at 09:21

## 2022-12-03 RX ADMIN — Medication 1: at 12:39

## 2022-12-03 RX ADMIN — RIVAROXABAN 15 MILLIGRAM(S): KIT at 18:51

## 2022-12-03 RX ADMIN — QUETIAPINE FUMARATE 25 MILLIGRAM(S): 200 TABLET, FILM COATED ORAL at 22:33

## 2022-12-03 RX ADMIN — CHLORHEXIDINE GLUCONATE 1 APPLICATION(S): 213 SOLUTION TOPICAL at 09:18

## 2022-12-04 PROCEDURE — 99232 SBSQ HOSP IP/OBS MODERATE 35: CPT

## 2022-12-04 RX ADMIN — QUETIAPINE FUMARATE 25 MILLIGRAM(S): 200 TABLET, FILM COATED ORAL at 21:25

## 2022-12-04 RX ADMIN — CHLORHEXIDINE GLUCONATE 1 APPLICATION(S): 213 SOLUTION TOPICAL at 09:08

## 2022-12-04 RX ADMIN — QUETIAPINE FUMARATE 25 MILLIGRAM(S): 200 TABLET, FILM COATED ORAL at 09:09

## 2022-12-04 RX ADMIN — RIVAROXABAN 15 MILLIGRAM(S): KIT at 09:09

## 2022-12-04 RX ADMIN — QUETIAPINE FUMARATE 25 MILLIGRAM(S): 200 TABLET, FILM COATED ORAL at 17:43

## 2022-12-04 RX ADMIN — Medication 10 MILLIGRAM(S): at 09:09

## 2022-12-04 RX ADMIN — Medication 1: at 07:47

## 2022-12-04 RX ADMIN — RIVAROXABAN 15 MILLIGRAM(S): KIT at 21:25

## 2022-12-05 LAB
APPEARANCE UR: ABNORMAL
BILIRUB UR-MCNC: NEGATIVE — SIGNIFICANT CHANGE UP
COLOR SPEC: YELLOW — SIGNIFICANT CHANGE UP
DIFF PNL FLD: ABNORMAL
GLUCOSE UR QL: NEGATIVE — SIGNIFICANT CHANGE UP
KETONES UR-MCNC: ABNORMAL
LEUKOCYTE ESTERASE UR-ACNC: ABNORMAL
NITRITE UR-MCNC: NEGATIVE — SIGNIFICANT CHANGE UP
PH UR: 5 — SIGNIFICANT CHANGE UP (ref 5–8)
PROT UR-MCNC: 30 MG/DL
SP GR SPEC: 1.02 — SIGNIFICANT CHANGE UP (ref 1.01–1.02)
UROBILINOGEN FLD QL: NEGATIVE — SIGNIFICANT CHANGE UP

## 2022-12-05 PROCEDURE — 99232 SBSQ HOSP IP/OBS MODERATE 35: CPT

## 2022-12-05 RX ADMIN — CHLORHEXIDINE GLUCONATE 1 APPLICATION(S): 213 SOLUTION TOPICAL at 10:49

## 2022-12-05 RX ADMIN — RIVAROXABAN 15 MILLIGRAM(S): KIT at 21:08

## 2022-12-05 RX ADMIN — QUETIAPINE FUMARATE 25 MILLIGRAM(S): 200 TABLET, FILM COATED ORAL at 21:07

## 2022-12-05 NOTE — BH CONSULTATION LIAISON PROGRESS NOTE - NSBHCHARTREVIEWLAB_PSY_A_CORE FT
11.8   9.78  )-----------( 219      ( 28 Nov 2022 13:34 )             33.8   11-28    138  |  110<H>  |  17  ----------------------------<  197<H>  3.6   |  21<L>  |  0.91    Ca    8.2<L>      28 Nov 2022 13:34  Phos  2.4     11-28  Mg     2.0     11-28    TPro  6.1  /  Alb  2.4<L>  /  TBili  0.5  /  DBili  x   /  AST  22  /  ALT  34  /  AlkPhos  146<H>  11-28  
                               11.8   9.78  )-----------( 219      ( 28 Nov 2022 13:34 )             33.8   11-28    138  |  110<H>  |  17  ----------------------------<  197<H>  3.6   |  21<L>  |  0.91    Ca    8.2<L>      28 Nov 2022 13:34  Phos  2.4     11-28  Mg     2.0     11-28    TPro  6.1  /  Alb  2.4<L>  /  TBili  0.5  /  DBili  x   /  AST  22  /  ALT  34  /  AlkPhos  146<H>  11-28  
   last WBC >16 on 12/3/22, was trending up. U/A pending.

## 2022-12-06 PROCEDURE — 99232 SBSQ HOSP IP/OBS MODERATE 35: CPT

## 2022-12-06 RX ADMIN — RIVAROXABAN 15 MILLIGRAM(S): KIT at 21:10

## 2022-12-06 RX ADMIN — Medication 1: at 13:13

## 2022-12-06 RX ADMIN — RIVAROXABAN 15 MILLIGRAM(S): KIT at 11:43

## 2022-12-06 RX ADMIN — QUETIAPINE FUMARATE 25 MILLIGRAM(S): 200 TABLET, FILM COATED ORAL at 09:23

## 2022-12-06 RX ADMIN — CHLORHEXIDINE GLUCONATE 1 APPLICATION(S): 213 SOLUTION TOPICAL at 11:42

## 2022-12-06 RX ADMIN — QUETIAPINE FUMARATE 25 MILLIGRAM(S): 200 TABLET, FILM COATED ORAL at 21:10

## 2022-12-06 NOTE — PROGRESS NOTE ADULT - PROBLEM SELECTOR PLAN 2
Cefepime  f/u cultures
Ceftriaxone

## 2022-12-06 NOTE — PROGRESS NOTE ADULT - REASON FOR ADMISSION
Hypotension
confusion
Hypotension
Hypotension
AMS
Hypotension

## 2022-12-06 NOTE — PROGRESS NOTE ADULT - PROBLEM SELECTOR PROBLEM 2
Acute UTI
Case voided 
I left a voice mail message to schedule the Spermatocelectomy ordered by Dr Olya Henderson 
Patient returning call  Patient is declining surgery at this time with Dr Maria Elena Younger  Patient did not feel as he was treated with compassion  Patient states he was nervous and scared  Patient states this was second opinion and just wants to cancel at this time  Patient requesting we share this with doctor 
Acute UTI

## 2022-12-06 NOTE — PROGRESS NOTE ADULT - PROBLEM SELECTOR PROBLEM 1
Cognitive impairment

## 2022-12-06 NOTE — PROGRESS NOTE ADULT - PROBLEM SELECTOR PLAN 1
Psych followup  Placement
monitor

## 2022-12-07 PROCEDURE — 99232 SBSQ HOSP IP/OBS MODERATE 35: CPT

## 2022-12-07 RX ADMIN — CHLORHEXIDINE GLUCONATE 1 APPLICATION(S): 213 SOLUTION TOPICAL at 11:11

## 2022-12-07 RX ADMIN — QUETIAPINE FUMARATE 25 MILLIGRAM(S): 200 TABLET, FILM COATED ORAL at 17:12

## 2022-12-07 RX ADMIN — RIVAROXABAN 15 MILLIGRAM(S): KIT at 22:57

## 2022-12-07 RX ADMIN — RIVAROXABAN 15 MILLIGRAM(S): KIT at 11:10

## 2022-12-07 RX ADMIN — Medication 1: at 12:45

## 2022-12-07 RX ADMIN — QUETIAPINE FUMARATE 25 MILLIGRAM(S): 200 TABLET, FILM COATED ORAL at 11:10

## 2022-12-07 RX ADMIN — QUETIAPINE FUMARATE 25 MILLIGRAM(S): 200 TABLET, FILM COATED ORAL at 23:02

## 2022-12-07 NOTE — PROGRESS NOTE ADULT - ASSESSMENT
83 y/o female with pmhx of moderate AS, recent covid-19 and ecoli uti BIBA due to multiple lacerateions on arm from nail clipper admitted for further psych eval. Periods of agitation at home and in hospital. RRT on 11/28 for syncope and hypotension. Transferred to CCU, improved s/p fluid bolus.    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

## 2022-12-08 PROCEDURE — 99232 SBSQ HOSP IP/OBS MODERATE 35: CPT

## 2022-12-08 RX ORDER — QUETIAPINE FUMARATE 200 MG/1
1 TABLET, FILM COATED ORAL
Qty: 0 | Refills: 0 | DISCHARGE
Start: 2022-12-08

## 2022-12-08 RX ORDER — QUETIAPINE FUMARATE 200 MG/1
1 TABLET, FILM COATED ORAL
Qty: 60 | Refills: 0
Start: 2022-12-08 | End: 2023-01-06

## 2022-12-08 RX ORDER — METFORMIN HYDROCHLORIDE 850 MG/1
1 TABLET ORAL
Qty: 30 | Refills: 0
Start: 2022-12-08 | End: 2023-01-06

## 2022-12-08 RX ADMIN — QUETIAPINE FUMARATE 25 MILLIGRAM(S): 200 TABLET, FILM COATED ORAL at 09:04

## 2022-12-08 RX ADMIN — QUETIAPINE FUMARATE 25 MILLIGRAM(S): 200 TABLET, FILM COATED ORAL at 23:03

## 2022-12-08 RX ADMIN — Medication 1: at 08:04

## 2022-12-08 RX ADMIN — RIVAROXABAN 15 MILLIGRAM(S): KIT at 23:02

## 2022-12-08 RX ADMIN — RIVAROXABAN 15 MILLIGRAM(S): KIT at 09:04

## 2022-12-08 NOTE — DISCHARGE NOTE PROVIDER - HOSPITAL COURSE
83 y/o F recent admission to  11/11 - 11/24/2022 for management of acute infectious encephalopathy with hypoxic respiratory failure secondary to COVID19 complicated by multifocal PNA, E. coli UTI was BIBA from home for further evaluation and management of multiple lacerations to left forearm with a nail clipper. As per patient's daughter she was attempting to administer the patient's home medications as prescribed when the patient became upset, frustrated, and started cutting her left arm from her left wrist to her left elbow. In the ED the patient was evaluated by Psychiatry. As noted patient has no formal PPHx, and no history of SA/SIB. Additionally on the patient's prior admission she was noted to have episodes of agitation (requiring prns of Haloperidol and Quetiapine). In the ED the patient was confused awake and alert oriented to person, and place. The patient's daughter reports that the patient has ongoing decline in memory for the last few years and states at times patient leaves the house to go on walks but hasn't gotten lost. The patient's daughter states that since last night and into this morning the patient has been increasingly more agitated, and verbally abusive to her family.  RRT on 11/28 for syncope and hypotension. Transferred to CCU, improved s/p fluid bolus. 83 y/o female with pmhx of moderate AS, recent covid-19 and ecoli uti BIBA due to multiple lacerateions on arm from nail clipper admitted for further psych eval. Periods of agitation at home and in hospital. RRT on 11/28 for syncope and hypotension. Transferred to CCU, improved s/p fluid bolus.    Sepsis prsent on admission due to Uti: poa  due to Pseudomonas Uti:  completed  Cefepime IV day#7/7    DM type II  -Hypoglycemia Protocol, ISS, Accuchecks AC&HS    Dementia with psychotic features   -Psych evaluation noted  -Seroquel 25mg Bid  -zyprexa d/c due to excessive somnolence   does not need constant obs -cleared by psych for dc     h/o severe Covid with suspected pulmonary micro thrombi  -c/w xarelto as prescribed last admission on xarelto 15 BID until 12/19 and from 12/20 xarelto 20mg daily was prescribed last admission    Dispo: needs placement in SNF/Dementia unit vs home with 24 hr home care    Subjective: Patient seen this AM, awake, alert and oriented x 3, doing well,  constant obs discontinued       PHYSICAL EXAM:  GENERAL: NAD, lying in bed comfortably  HEAD:  Atraumatic, Normocephalic  EYES: conjunctiva and sclera clear  ENT: Moist mucous membranes  NECK: Supple, No JVD  CHEST/LUNG: Clear to auscultation bilaterally; No rales, rhonchi, wheezing, or rubs. Unlabored respirations  HEART: Regular rate and rhythm; No murmurs, rubs, or gallops  ABDOMEN: Bowel sounds present; Soft, Nontender, Nondistended.   EXTREMITIES:  2+ Peripheral Pulses, brisk capillary refill. No clubbing, cyanosis, or edema  NERVOUS SYSTEM:  Alert & Oriented X3, speech clear. No deficits   MSK: FROM all 4 extremities, full and equal strength  SKIN: dry linear scars to L forearm (old)    discharge time 47mins   I dw with daughter 83 y/o F recent admission to  11/11 - 11/24/2022 for management of acute infectious encephalopathy with hypoxic respiratory failure secondary to COVID19 complicated by multifocal PNA, E. coli UTI was BIBA from home for further evaluation and management of multiple lacerations to left forearm with a nail clipper. As per patient's daughter she was attempting to administer the patient's home medications as prescribed when the patient became upset, frustrated, and started cutting her left arm from her left wrist to her left elbow. In the ED the patient was evaluated by Psychiatry. As noted patient has no formal PPHx, and no history of SA/SIB. Additionally on the patient's prior admission she was noted to have episodes of agitation (requiring prns of Haloperidol and Quetiapine). In the ED the patient was confused awake and alert oriented to person, and place. The patient's daughter reports that the patient has ongoing decline in memory for the last few years and states at times patient leaves the house to go on walks but hasn't gotten lost. The patient's daughter states that since last night and into this morning the patient has been increasingly more agitated, and verbally abusive to her family.  RRT on 11/28 for syncope and hypotension. Transferred to CCU, improved s/p fluid bolus. 83 y/o female with pmhx of moderate AS, recent covid-19 and ecoli uti BIBA due to multiple lacerateions on arm from nail clipper admitted for further psych eval. Periods of agitation at home and in hospital. RRT on 11/28 for syncope and hypotension. Transferred to CCU, improved s/p fluid bolus.    Sepsis prsent on admission due to Uti: poa  due to Pseudomonas Uti:  completed  Cefepime IV day#7/7    DM type II  -Hypoglycemia Protocol, ISS, Accuchecks AC&HS    Dementia with psychotic features   -Psych evaluation noted  -Seroquel 25mg Bid  -zyprexa d/c due to excessive somnolence   does not need constant obs -cleared by psych for dc     h/o severe Covid with suspected pulmonary micro thrombi  -c/w xarelto as prescribed last admission on xarelto 15 BID until 12/15 and from 12/16 xarelto 20mg daily was prescribed last admission    Dispo: needs placement in SNF/Dementia unit vs home with 24 hr home care    Subjective: Patient seen this AM, awake, alert and oriented x 3, doing well,  constant obs discontinued       PHYSICAL EXAM:  GENERAL: NAD, lying in bed comfortably  HEAD:  Atraumatic, Normocephalic  EYES: conjunctiva and sclera clear  ENT: Moist mucous membranes  NECK: Supple, No JVD  CHEST/LUNG: Clear to auscultation bilaterally; No rales, rhonchi, wheezing, or rubs. Unlabored respirations  HEART: Regular rate and rhythm; No murmurs, rubs, or gallops  ABDOMEN: Bowel sounds present; Soft, Nontender, Nondistended.   EXTREMITIES:  2+ Peripheral Pulses, brisk capillary refill. No clubbing, cyanosis, or edema  NERVOUS SYSTEM:  Alert & Oriented X3, speech clear. No deficits   MSK: FROM all 4 extremities, full and equal strength  SKIN: dry linear scars to L forearm (old)    discharge time 47mins   I dw with daughter 83 y/o F recent admission to  11/11 - 11/24/2022 for management of acute infectious encephalopathy with hypoxic respiratory failure secondary to COVID19 complicated by multifocal PNA, E. coli UTI was BIBA from home for further evaluation and management of multiple lacerations to left forearm with a nail clipper. As per patient's daughter she was attempting to administer the patient's home medications as prescribed when the patient became upset, frustrated, and started cutting her left arm from her left wrist to her left elbow. In the ED the patient was evaluated by Psychiatry. As noted patient has no formal PPHx, and no history of SA/SIB. Additionally on the patient's prior admission she was noted to have episodes of agitation (requiring prns of Haloperidol and Quetiapine). In the ED the patient was confused awake and alert oriented to person, and place. The patient's daughter reports that the patient has ongoing decline in memory for the last few years and states at times patient leaves the house to go on walks but hasn't gotten lost. The patient's daughter states that since last night and into this morning the patient has been increasingly more agitated, and verbally abusive to her family.  RRT on 11/28 for syncope and hypotension.     Transferred to CCU, improved s/p fluid bolus.     83 y/o female with pmhx of moderate AS, recent covid-19 and ecoli uti BIBA due to multiple lacerateions on arm from nail clipper admitted for further psych eval. Periods of agitation at home and in hospital. RRT on 11/28 for syncope and hypotension. Transferred to CCU, improved s/p fluid bolus.    Sepsis prsent on admission due to Uti: poa  due to Pseudomonas Uti:  completed  Cefepime IV day#7/7    DM type II  -Hypoglycemia Protocol, ISS, Accuchecks AC&HS    Dementia with psychotic features   -Psych evaluation noted  -Seroquel 25mg Bid  -zyprexa d/c due to excessive somnolence   does not need constant obs -cleared by psych for dc     h/o severe Covid with suspected pulmonary micro thrombi  -c/w xarelto as prescribed last admission on xarelto 15 BID until 12/15 and from 12/16 xarelto 20mg daily was prescribed last admission    Dispo: needs placement in SNF/Dementia unit vs home with 24 hr home care    Subjective: Patient seen this AM, awake, alert following commands, doing well,  constant obs discontinued       PHYSICAL EXAM:  GENERAL: NAD, lying in bed comfortably  HEAD:  Atraumatic, Normocephalic  EYES: conjunctiva and sclera clear  ENT: Moist mucous membranes  NECK: Supple, No JVD  CHEST/LUNG: Clear to auscultation bilaterally; No rales, rhonchi, wheezing, or rubs. Unlabored respirations  HEART: Regular rate and rhythm; No murmurs, rubs, or gallops  ABDOMEN: Bowel sounds present; Soft, Nontender, Nondistended.   EXTREMITIES:  2+ Peripheral Pulses, brisk capillary refill. No clubbing, cyanosis, or edema  NERVOUS SYSTEM:  Alert & Oriented X3, speech clear. No deficits   MSK: FROM all 4 extremities, full and equal strength  SKIN: dry linear scars to L forearm (old)    discharge time 47mins   I dw with daughter 81 y/o F recent admission to  11/11 - 11/24/2022 for management of acute infectious encephalopathy with hypoxic respiratory failure secondary to COVID19 complicated by multifocal PNA, E. coli UTI was BIBA from home for further evaluation and management of multiple lacerations to left forearm with a nail clipper. As per patient's daughter she was attempting to administer the patient's home medications as prescribed when the patient became upset, frustrated, and started cutting her left arm from her left wrist to her left elbow. In the ED the patient was evaluated by Psychiatry. As noted patient has no formal PPHx, and no history of SA/SIB. Additionally on the patient's prior admission she was noted to have episodes of agitation (requiring prns of Haloperidol and Quetiapine). In the ED the patient was confused awake and alert oriented to person, and place. The patient's daughter reports that the patient has ongoing decline in memory for the last few years and states at times patient leaves the house to go on walks but hasn't gotten lost. The patient's daughter states that since last night and into this morning the patient has been increasingly more agitated, and verbally abusive to her family.  RRT on 11/28 for syncope and hypotension.     Transferred to CCU, improved s/p fluid bolus.     81 y/o female with pmhx of moderate AS, recent covid-19 and ecoli uti BIBA due to multiple lacerateions on arm from nail clipper admitted for further psych eval. Periods of agitation at home and in hospital. RRT on 11/28 for syncope and hypotension. Transferred to CCU, improved s/p fluid bolus.    Sepsis prsent on admission due to Uti: poa  due to Pseudomonas Uti:  completed  Cefepime IV day#7/7    DM type II  -Hypoglycemia Protocol, ISS, Accuchecks AC&HS    Dementia with psychotic features   -Psych evaluation noted  -Seroquel 25mg Bid  -zyprexa d/c due to excessive somnolence   does not need constant obs -cleared by psych for dc     h/o severe Covid with suspected pulmonary micro thrombi  -c/w xarelto as prescribed last admission on xarelto 15 BID until 12/15 and from 12/16 xarelto 20mg daily was prescribed last admission    Dispo: needs placement in SNF/Dementia unit vs home with 24 hr home care    Subjective: Patient seen this AM, awake, alert following commands, doing well,  constant obs discontinued     Vital Signs Last 24 Hrs  T(C): 36.6 (12 Dec 2022 07:56), Max: 36.6 (12 Dec 2022 07:56)  T(F): 97.9 (12 Dec 2022 07:56), Max: 97.9 (12 Dec 2022 07:56)  HR: 79 (12 Dec 2022 10:03) (79 - 91)  BP: 99/59 (12 Dec 2022 10:03) (91/65 - 108/66)  BP(mean): --  RR: 16 (12 Dec 2022 07:56) (16 - 18)  SpO2: 94% (12 Dec 2022 07:56) (94% - 98%)    Parameters below as of 12 Dec 2022 07:56  Patient On (Oxygen Delivery Method): room air      PHYSICAL EXAM:  GENERAL: NAD, lying in bed comfortably  HEAD:  Atraumatic, Normocephalic  EYES: conjunctiva and sclera clear  ENT: Moist mucous membranes  NECK: Supple, No JVD  CHEST/LUNG: Clear to auscultation bilaterally; No rales, rhonchi, wheezing, or rubs. Unlabored respirations  HEART: Regular rate and rhythm; No murmurs, rubs, or gallops  ABDOMEN: Bowel sounds present; Soft, Nontender, Nondistended.   EXTREMITIES:  2+ Peripheral Pulses, brisk capillary refill. No clubbing, cyanosis, or edema  NERVOUS SYSTEM:  Alert & Oriented X3, speech clear. No deficits   MSK: FROM all 4 extremities, full and equal strength  SKIN: dry linear scars to L forearm (old)    discharge time 47mins   I dw with daughter

## 2022-12-08 NOTE — DISCHARGE NOTE NURSING/CASE MANAGEMENT/SOCIAL WORK - PATIENT PORTAL LINK FT
You can access the FollowMyHealth Patient Portal offered by [facility] by registering at the following website: [email or web address] By joining [facility]’s FollowMyHealth portal, you will also be able to view your health information using other applications (apps) compatible with our system.

## 2022-12-08 NOTE — DISCHARGE NOTE NURSING/CASE MANAGEMENT/SOCIAL WORK - NSDCPEFALRISK_GEN_ALL_CORE
For information on Fall & Injury Prevention, visit: [email or web address] OR  [email or web address] OR  https://www.cdc.gov/steadi/patient.html

## 2022-12-08 NOTE — DISCHARGE NOTE PROVIDER - NSDCMRMEDTOKEN_GEN_ALL_CORE_FT
albuterol 90 mcg/inh inhalation aerosol: 2 puff(s) inhaled every 6 hours, As needed, Shortness of Breath and/or Wheezing  metFORMIN 500 mg oral tablet, extended release: 1 tab(s) orally once a day  QUEtiapine 25 mg oral tablet: 1 tab(s) orally 2 times a day  QUEtiapine 25 mg oral tablet: 1 tab(s) orally every 6 hours, As needed, moderate psychotic  agitation  Xarelto Starter Pack 15 mg-20 mg oral kit: 1 tab(s) orally 2 times a day

## 2022-12-08 NOTE — DISCHARGE NOTE PROVIDER - CARE PROVIDER_API CALL
primary doctor,   Phone: (   )    -  Fax: (   )    -  Follow Up Time:     Psychiatry,   Phone: (   )    -  Fax: (   )    -  Follow Up Time:    primary doctor,   Phone: (   )    -  Fax: (   )    -  Follow Up Time:     Psychiatry,   Phone: (   )    -  Fax: (   )    -  Follow Up Time:     Carlos Childs  INTERNAL MEDICINE  [address] [place] [address]  Phone: [phone]  Fax: [phone]  Follow Up Time:

## 2022-12-08 NOTE — DISCHARGE NOTE PROVIDER - NSDCCPCAREPLAN_GEN_ALL_CORE_FT
PRINCIPAL DISCHARGE DIAGNOSIS  Diagnosis: Acute UTI  Assessment and Plan of Treatment: complete antibiotic course   follow up with primary doctor in 1 week and psychiatry as outpatient s soon as possible       PRINCIPAL DISCHARGE DIAGNOSIS  Diagnosis: Acute UTI  Assessment and Plan of Treatment: complete antibiotic course   follow up with primary doctor in 1 week and psychiatry as outpatient s soon as possible  follow with bautista pulmonologist

## 2022-12-08 NOTE — DISCHARGE NOTE PROVIDER - PROVIDER TOKENS
FREE:[LAST:[primary doctor],PHONE:[(   )    -],FAX:[(   )    -]],FREE:[LAST:[Psychiatry],PHONE:[(   )    -],FAX:[(   )    -]] FREE:[LAST:[primary doctor],PHONE:[(   )    -],FAX:[(   )    -]],FREE:[LAST:[Psychiatry],PHONE:[(   )    -],FAX:[(   )    -]],PROVIDER:[TOKEN:[[id number]]]

## 2022-12-09 LAB — SARS-COV-2 RNA SPEC QL NAA+PROBE: SIGNIFICANT CHANGE UP

## 2022-12-09 PROCEDURE — 99232 SBSQ HOSP IP/OBS MODERATE 35: CPT

## 2022-12-09 RX ADMIN — Medication 0.5 MILLIGRAM(S): at 18:09

## 2022-12-09 RX ADMIN — QUETIAPINE FUMARATE 25 MILLIGRAM(S): 200 TABLET, FILM COATED ORAL at 09:34

## 2022-12-09 RX ADMIN — QUETIAPINE FUMARATE 25 MILLIGRAM(S): 200 TABLET, FILM COATED ORAL at 21:26

## 2022-12-09 RX ADMIN — RIVAROXABAN 15 MILLIGRAM(S): KIT at 09:35

## 2022-12-09 RX ADMIN — RIVAROXABAN 15 MILLIGRAM(S): KIT at 21:26

## 2022-12-09 RX ADMIN — Medication 1: at 17:33

## 2022-12-09 RX ADMIN — QUETIAPINE FUMARATE 25 MILLIGRAM(S): 200 TABLET, FILM COATED ORAL at 17:37

## 2022-12-09 NOTE — BH CONSULTATION LIAISON PROGRESS NOTE - NSBHMSESPEECH_PSY_A_CORE
Normal volume, rate, productivity, spontaneity and articulation
Non-verbal: unable to assess speech further
Abnormal as indicated, otherwise normal...
Normal volume, rate, productivity, spontaneity and articulation

## 2022-12-09 NOTE — BH CONSULTATION LIAISON PROGRESS NOTE - NSICDXBHPRIMARYDX_PSY_ALL_CORE
Delirium due to general medical condition   F05  

## 2022-12-09 NOTE — BH CONSULTATION LIAISON PROGRESS NOTE - NSBHCONSFOLLOWNEEDS_PSY_ALL_CORE
No psychiatric contraindications to discharge
Needs further psych safety assessment prior to discharge

## 2022-12-09 NOTE — BH CONSULTATION LIAISON PROGRESS NOTE - NSBHCHARTREVIEWVS_PSY_A_CORE FT
Vital Signs Last 24 Hrs  T(C): 36.1 (29 Nov 2022 00:51), Max: 36.2 (28 Nov 2022 17:38)  T(F): 97 (29 Nov 2022 00:51), Max: 97.2 (28 Nov 2022 17:38)  HR: 70 (29 Nov 2022 13:00) (52 - 104)  BP: 131/71 (29 Nov 2022 13:00) (79/45 - 176/103)  BP(mean): 87 (29 Nov 2022 13:00) (53 - 118)  RR: 19 (29 Nov 2022 13:00) (13 - 23)  SpO2: 99% (29 Nov 2022 09:00) (95% - 100%)    Parameters below as of 28 Nov 2022 16:00  Patient On (Oxygen Delivery Method): room air    
Vital Signs Last 24 Hrs  T(C): 36.8 (09 Dec 2022 07:57), Max: 36.8 (08 Dec 2022 15:54)  T(F): 98.2 (09 Dec 2022 07:57), Max: 98.2 (08 Dec 2022 15:54)  HR: 95 (09 Dec 2022 07:57) (75 - 97)  BP: 109/74 (09 Dec 2022 07:57) (109/74 - 116/69)  BP(mean): --  RR: 18 (09 Dec 2022 07:57) (17 - 18)  SpO2: 97% (09 Dec 2022 07:57) (93% - 97%)    Parameters below as of 09 Dec 2022 07:57  Patient On (Oxygen Delivery Method): room air    
Vital Signs Last 24 Hrs  T(C): 36.3 (05 Dec 2022 10:00), Max: 36.3 (04 Dec 2022 15:10)  T(F): 97.3 (05 Dec 2022 10:00), Max: 97.3 (04 Dec 2022 15:10)  HR: 71 (05 Dec 2022 10:00) (70 - 71)  BP: 118/65 (05 Dec 2022 10:00) (118/65 - 127/65)  BP(mean): --  RR: 18 (05 Dec 2022 10:00) (18 - 18)  SpO2: 95% (05 Dec 2022 10:00) (95% - 95%)    Parameters below as of 05 Dec 2022 10:00  Patient On (Oxygen Delivery Method): room air    
Vital Signs Last 24 Hrs  T(C): 35.9 (28 Nov 2022 13:00), Max: 37.3 (28 Nov 2022 06:06)  T(F): 96.6 (28 Nov 2022 13:00), Max: 99.1 (28 Nov 2022 06:06)  HR: 89 (28 Nov 2022 15:03) (66 - 89)  BP: 117/62 (28 Nov 2022 15:03) (112/55 - 161/79)  BP(mean): 73 (28 Nov 2022 15:03) (69 - 73)  RR: 16 (28 Nov 2022 15:03) (16 - 21)  SpO2: 96% (28 Nov 2022 14:00) (93% - 100%)    Parameters below as of 28 Nov 2022 13:00  Patient On (Oxygen Delivery Method): nasal cannula  O2 Flow (L/min): 2

## 2022-12-09 NOTE — BH CONSULTATION LIAISON PROGRESS NOTE - NSBHFUPINTERVALHXFT_PSY_A_CORE
Pt is alert need prompting to engage in conversation.   She states she is upset  because she wants to be with her family.   No agitation, pt is taking mes and compliant  with treatement.   Se is depressed in the light of being in hospital, but brightens up smiles, states "I know everything":.   No SI, HI, AH, VH, PI. 
PT was sleeping, on awakening cooperative, good eye contact. She denies depression and anxiety, denies insomnia and apatite problems. Denies any type of SI, HI, AH, VH, PI. She continues to be confused, disoriented  in time, place and  situation.            
Pt states that she would never kill herself and tat she was scratching herself because of itch.   She denies hx of SA, denies current SI, plan or intent to harm herself. Denies depressed mood and anxiety, denies any problems. She Denies AH, VH, PI.   She is aware of memory problems.   PT does not know the date (December 22 2022) she knows the town, but could not figure out that it is hospital.   Per daughter pt is having memory problems in last 3 years. Lately she was going out of her house without anyone knowing that.   She has short term memory problems an per daughter she forgets important events, e/g she repeatedly keeps forgetting that her grand daughter is going to college.    Daughter reports that pt  was agitated when she cut herself, and she did it very violently in the presence of her grandchildren who are traumatized and cannot sleep after that.   daughter states pt cannot return to her home.             
Pt is alert but mute. Per staff she "talks when  she wants".   She does not talk but acknowledges understanding  the questions by  knocking her head. She denies being depressed and anxious. She denies hearing voices. She denies having thoughts about harming self and denies thoughts  abut harming somebody else.   She is concrete, She has difficulty understanding questions  about "harming self and others" but  when  asked if thinks "about killing self  or others, she answerers  and denies it.     MD RN pt put a straw  in her rectum last night.    sleeping, on awakening cooperative, good eye contact.   VH, PI. She continues to be confused, disoriented  in time, place and  situation.

## 2022-12-09 NOTE — BH CONSULTATION LIAISON PROGRESS NOTE - NSICDXBHSECONDARYDX_PSY_ALL_CORE
Cognitive impairment   R41.50  
Cognitive impairment   R41.20  
Cognitive impairment   R41.63  
Cognitive impairment   R41.01

## 2022-12-09 NOTE — PROGRESS NOTE ADULT - ASSESSMENT
81 y/o female with pmhx of moderate AS, recent covid-19 and ecoli uti BIBA due to multiple lacerateions on arm from nail clipper admitted for further psych eval. Periods of agitation at home and in hospital. RRT on 11/28 for syncope and hypotension. Transferred to CCU, improved s/p fluid bolus.    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

## 2022-12-09 NOTE — BH CONSULTATION LIAISON PROGRESS NOTE - NSBHCONSULTRECOMMENDOTHER_PSY_A_CORE FT
-Minimize use of benzos, opioids, anticholinergics, or other deliriogenic agents when possible.  Maintain sleep wake cycle.  Provide frequent reorientation and redirection.  Family member at bedside if possible. Assess for need for glasses and hearing aid (if applicable).  

## 2022-12-09 NOTE — BH CONSULTATION LIAISON PROGRESS NOTE - CURRENT MEDICATION
MEDICATIONS  (STANDING):  cefepime   IVPB 2000 milliGRAM(s) IV Intermittent every 12 hours  chlorhexidine 4% Liquid 1 Application(s) Topical <User Schedule>  dextrose 5%. 1000 milliLiter(s) (50 mL/Hr) IV Continuous <Continuous>  dextrose 5%. 1000 milliLiter(s) (100 mL/Hr) IV Continuous <Continuous>  dextrose 50% Injectable 25 Gram(s) IV Push once  dextrose 50% Injectable 12.5 Gram(s) IV Push once  dextrose 50% Injectable 25 Gram(s) IV Push once  glucagon  Injectable 1 milliGRAM(s) IntraMuscular once  insulin lispro (ADMELOG) corrective regimen sliding scale   SubCutaneous three times a day before meals  insulin lispro (ADMELOG) corrective regimen sliding scale   SubCutaneous at bedtime  OLANZapine Injectable 2.5 milliGRAM(s) IntraMuscular once  QUEtiapine 25 milliGRAM(s) Oral two times a day  rivaroxaban 15 milliGRAM(s) Oral two times a day  vancomycin  IVPB 1000 milliGRAM(s) IV Intermittent every 24 hours    MEDICATIONS  (PRN):  albuterol    90 MICROgram(s) HFA Inhaler 2 Puff(s) Inhalation every 6 hours PRN Shortness of Breath and/or Wheezing  dextrose Oral Gel 15 Gram(s) Oral once PRN Blood Glucose LESS THAN 70 milliGRAM(s)/deciliter  OLANZapine Injectable 2.5 milliGRAM(s) IntraMuscular every 6 hours PRN SEVERE PSYCHIOTIC AGITATION  QUEtiapine 25 milliGRAM(s) Oral every 6 hours PRN moderate psychotic  agitation  
MEDICATIONS  (STANDING):  dextrose 5%. 1000 milliLiter(s) (100 mL/Hr) IV Continuous <Continuous>  dextrose 5%. 1000 milliLiter(s) (50 mL/Hr) IV Continuous <Continuous>  dextrose 50% Injectable 25 Gram(s) IV Push once  dextrose 50% Injectable 12.5 Gram(s) IV Push once  dextrose 50% Injectable 25 Gram(s) IV Push once  glucagon  Injectable 1 milliGRAM(s) IntraMuscular once  insulin lispro (ADMELOG) corrective regimen sliding scale   SubCutaneous three times a day before meals  insulin lispro (ADMELOG) corrective regimen sliding scale   SubCutaneous at bedtime  QUEtiapine 25 milliGRAM(s) Oral two times a day  rivaroxaban 15 milliGRAM(s) Oral two times a day    MEDICATIONS  (PRN):  albuterol    90 MICROgram(s) HFA Inhaler 2 Puff(s) Inhalation every 6 hours PRN Shortness of Breath and/or Wheezing  dextrose Oral Gel 15 Gram(s) Oral once PRN Blood Glucose LESS THAN 70 milliGRAM(s)/deciliter  QUEtiapine 25 milliGRAM(s) Oral every 6 hours PRN moderate psychotic  agitation  
MEDICATIONS  (STANDING):  cefepime   IVPB 2000 milliGRAM(s) IV Intermittent every 12 hours  chlorhexidine 4% Liquid 1 Application(s) Topical <User Schedule>  dextrose 5%. 1000 milliLiter(s) (50 mL/Hr) IV Continuous <Continuous>  dextrose 5%. 1000 milliLiter(s) (100 mL/Hr) IV Continuous <Continuous>  dextrose 50% Injectable 25 Gram(s) IV Push once  dextrose 50% Injectable 12.5 Gram(s) IV Push once  dextrose 50% Injectable 25 Gram(s) IV Push once  glucagon  Injectable 1 milliGRAM(s) IntraMuscular once  insulin lispro (ADMELOG) corrective regimen sliding scale   SubCutaneous three times a day before meals  insulin lispro (ADMELOG) corrective regimen sliding scale   SubCutaneous at bedtime  lactated ringers Bolus 1000 milliLiter(s) IV Bolus once  predniSONE   Tablet 40 milliGRAM(s) Oral daily  QUEtiapine 25 milliGRAM(s) Oral two times a day  rivaroxaban 15 milliGRAM(s) Oral two times a day  sodium chloride 0.9% Bolus 1000 milliLiter(s) IV Bolus once  vancomycin  IVPB 1000 milliGRAM(s) IV Intermittent every 24 hours    MEDICATIONS  (PRN):  albuterol    90 MICROgram(s) HFA Inhaler 2 Puff(s) Inhalation every 6 hours PRN Shortness of Breath and/or Wheezing  dextrose Oral Gel 15 Gram(s) Oral once PRN Blood Glucose LESS THAN 70 milliGRAM(s)/deciliter  haloperidol     Tablet 1 milliGRAM(s) Oral every 8 hours PRN agitation  
MEDICATIONS  (STANDING):  chlorhexidine 4% Liquid 1 Application(s) Topical <User Schedule>  dextrose 5%. 1000 milliLiter(s) (50 mL/Hr) IV Continuous <Continuous>  dextrose 5%. 1000 milliLiter(s) (100 mL/Hr) IV Continuous <Continuous>  dextrose 50% Injectable 25 Gram(s) IV Push once  dextrose 50% Injectable 12.5 Gram(s) IV Push once  dextrose 50% Injectable 25 Gram(s) IV Push once  glucagon  Injectable 1 milliGRAM(s) IntraMuscular once  insulin lispro (ADMELOG) corrective regimen sliding scale   SubCutaneous three times a day before meals  insulin lispro (ADMELOG) corrective regimen sliding scale   SubCutaneous at bedtime  QUEtiapine 25 milliGRAM(s) Oral two times a day  rivaroxaban 15 milliGRAM(s) Oral two times a day    MEDICATIONS  (PRN):  albuterol    90 MICROgram(s) HFA Inhaler 2 Puff(s) Inhalation every 6 hours PRN Shortness of Breath and/or Wheezing  dextrose Oral Gel 15 Gram(s) Oral once PRN Blood Glucose LESS THAN 70 milliGRAM(s)/deciliter  QUEtiapine 25 milliGRAM(s) Oral every 6 hours PRN moderate psychotic  agitation

## 2022-12-09 NOTE — BH CONSULTATION LIAISON PROGRESS NOTE - NSBHASSESSMENTFT_PSY_ALL_CORE
81 y/o  female, with 1 adult daughter, lives alone in [place] in an apartment, with no formal PPHx, no h/o SA/SIB, recent admission to  on 11/11 for COVID, UTI, discharged on 11/23, noted to have episodes of agitation during that admission, with memory problems last 3 years that is getting worse last months admitted via   ED c/o multiple lacerations to her left forearm.  Psychiatry consulted to assess for management of depression s/p self injurious behaviors.  Pt  denies active SI/HI, AVH, or thoughts of paranoia.  Per daughter, patient with memory impairments in the last few years (short term), states however recently hospitalized for COVID 2 weeks ago and was discharged home on 11/23.  She reports patient agitated at home particularly around the time of medication administration as patient "does not believe in medicines",.  States patient had recent episodes of agitation while in the hospital for which she was discharged home with Seroquel which family has been trying to give her.  She denies patient with any previous PPHx or h/o SA or SIB, they deny patient has ever seen a neurologist.    PT cutting herself was not a suicide attempt and it was rather due to confusion and agitation. However, she did it in front of her grandchildren and daughter can not take her back to her home.  She continue ot be delirious, now her WBC is increased to >16 on 12/3/22. pending  U/A urine collection,.   This time she appears apathic, disinterested  in environment, not engaged, poor response to promoting.     Pt does not need inpatient psychiatry admission.     Plan:   QUEtiapine 25 milliGRAM(s) Oral two times a day    QUEtiapine 25 milliGRAM(s) Oral every 6 hours PRN moderate psychotic  agitation    Surgiset dong an EEG to r/o generalized slowness. 
81 y/o  female, with 1 adult daughter, lives alone in [place] in an apartment, with no formal PPHx, no h/o SA/SIB, recent admission to  on 11/11 for COVID, UTI, discharged on 11/23, noted to have episodes of agitation during that admission, with memory problems last 3 years that is getting worse last months admitted via   ED c/o multiple lacerations to her left forearm.  Psychiatry consulted to assess for management of depression s/p self injurious behaviors.  Pt  denies active SI/HI, AVH, or thoughts of paranoia.  Per daughter, patient with memory impairments in the last few years (short term), states however recently hospitalized for COVID 2 weeks prior to this episode  and was discharged home on 11/23.  She reports patient agitated at home particularly around the time of medication administration as patient "does not believe in medicines",.  States patient had recent episodes of agitation while in the hospital for which she was discharged home with Seroquel which family has been trying to give her.  She denies patient with any previous PPHx or h/o SA or SIB, they deny patient has ever seen a neurologist.    PT cutting herself was not a suicide attempt and it was rather due to confusion and agitation. However, she did it in front of her grandchildren and daughter can not take her back to her home.  delirium is resolving, pt continue  to have underlying cognitive impairment due to dementia.     Pt does not need inpatient psychiatry admission.   She is cleared for d/c.  Plan:   QUEtiapine 25 milliGRAM(s) Oral two times a day  QUEtiapine 25 milliGRAM(s) Oral every 6 hours PRN moderate psychotic  agitation    Coordinated with CSW and Dr Kaiser.  Plan is to d/c pt top subacute rehabh.   She can continue psych care (if needed) with [facility] psychiatrist.         
83 y/o  female, with 1 adult daughter, lives alone in [place] in an apartment, with no formal PPHx, no h/o SA/SIB, recent admission to  on 11/11 for COVID, UTI, discharged on 11/23, noted to have episodes of agitation during that admission, with memory problems last 3 years that is getting worse last months admitted via   ED c/o multiple lacerations to her left forearm.  Psychiatry consulted to assess for management of depression s/p self injurious behaviors.  Pt  denies active SI/HI, AVH, or thoughts of paranoia.  Per daughter, patient with memory impairments in the last few years (short term), states however recently hospitalized for COVID 2 weeks ago and was discharged home on 11/23.  She reports patient agitated at home particularly around the time of medication administration as patient "does not believe in medicines",.  States patient had recent episodes of agitation while in the hospital for which she was discharged home with Seroquel which family has been trying to give her.  She denies patient with any previous PPHx or h/o SA or SIB, they deny patient has ever seen a neurologist.    PT cutting herself was not a suicide attempt and it was rather due to confusion and agitation. However, she did it in front of her grandchildren and daughter can not take her back to her home.  Pt does not need inpatient psychiatry admission.     Plan:   No imminent risk to harm self and acute risk is low. d/c CO   Primary team prefers Zyprexa IM over haldol. Continue with Zyprexa 2.5 mg IM for severe psychotic agitation.   Quetiapine 25mg PO q 6 h PRN moderate psychotic agitation,   stranding QUEtiapine 25 milliGRAM(s) Oral two times a day       
81 y/o  female, with 1 adult daughter, lives alone in [place] in an apartment, with no formal PPHx, no h/o SA/SIB, recent admission to  on 11/11 for COVID, UTI, discharged on 11/23, noted to have episodes of agitation during that admission, with memory problems last 3 years that is getting worse last months admitted via   ED c/o multiple lacerations to her left forearm.  Psychiatry consulted to assess for management of depression s/p self injurious behaviors.  Pt  denies active SI/HI, AVH, or thoughts of paranoia.  Per daughter, patient with memory impairments in the last few years (short term), states however recently hospitalized for COVID 2 weeks ago and was discharged home on 11/23.  She reports patient agitated at home particularly around the time of medication administration as patient "does not believe in medicines",.  States patient had recent episodes of agitation while in the hospital for which she was discharged home with Seroquel which family has been trying to give her.  She denies patient with any previous PPHx or h/o SA or SIB, they deny patient has ever seen a neurologist.    PT cutting herself was not a suicide attempt and it was rather due to confusion and agitation. However, she did it in front of her grandchildren and daughter can not take her back to her home.  pt was not complaint with meds when she  was home and     Plan:   Continue CO for now as pt just re-started her meds and she is still at high risk to self injurious behavior.   Continue current meds:  QUEtiapine 25 milliGRAM(s) Oral two times a day  haloperidol     Tablet 1 milliGRAM(s) Oral every 8 hours PRN agitation  haloperidol    Injectable 1 milliGRAM(s) IntraMuscular every 6 hours PRN severe psychotic agitation

## 2022-12-09 NOTE — BH CONSULTATION LIAISON PROGRESS NOTE - NSBHCONSULTFOLLOWAFTERCARE_PSY_A_CORE FT
Pt can be followed by psych in rehab or if d/c-ed home CSW can make referral to local provider (depending if that si Jill or Sheila). 

## 2022-12-09 NOTE — BH CONSULTATION LIAISON PROGRESS NOTE - GENERAL APPEARANCE
No deformities present
No deformities present/Other
No deformities present/Other
No deformities present

## 2022-12-09 NOTE — BH CONSULTATION LIAISON PROGRESS NOTE - NSBHCONSULTMEDSEVERE_PSY_A_CORE FT
haloperidol    Injectable 1 milliGRAM(s) IntraMuscular every 6 hours PRN severe psychotic agitation  
 Zyprexa 2.5 mg IM for severe psychotic agitation. 

## 2022-12-09 NOTE — BH CONSULTATION LIAISON PROGRESS NOTE - NSBHCONSULTMEDPRNREASON_PSY_A_CORE
agitation.../severe agitation...

## 2022-12-09 NOTE — BH CONSULTATION LIAISON PROGRESS NOTE - NSBHCONSULTMEDAGITATION_PSY_A_CORE FT
Quetiapine 25mg PO q 6 h PRN moderate psychotic agitation,   
Quetiapine 25mg PO q 6 h PRN moderate psychotic agitation,   
haloperidol     Tablet 1 milliGRAM(s) Oral every 8 hours PRN agitation  
Quetiapine 25mg PO q 6 h PRN moderate psychotic agitation,

## 2022-12-09 NOTE — BH CONSULTATION LIAISON PROGRESS NOTE - NSBHATTESTBILLINGAW_PSY_A_CORE
[id number] Inpatient care - moderate complexity - 25 minutes
[id number] Inpatient care - moderate complexity - 25 minutes
[id number] Inpatient care - low complexity - 15 minutes
[id number] Inpatient care - moderate complexity - 25 minutes

## 2022-12-09 NOTE — BH CONSULTATION LIAISON PROGRESS NOTE - NSBHPSYCHOLCOGABN_PSY_A_CORE
disoriented to time/disoriented to place/disoriented to situation
disoriented to time/disoriented to place/disoriented to situation
disoriented to time/disoriented to situation

## 2022-12-09 NOTE — BH CONSULTATION LIAISON PROGRESS NOTE - NSBHFUPINTERVALCCFT_PSY_A_CORE
I am upset       
I am good 
Not verbal, but knocking her head "no" when asked about having any complaints.       
I would never kill myself

## 2022-12-10 PROCEDURE — 99232 SBSQ HOSP IP/OBS MODERATE 35: CPT

## 2022-12-10 RX ORDER — LANOLIN ALCOHOL/MO/W.PET/CERES
3 CREAM (GRAM) TOPICAL AT BEDTIME
Refills: 0 | Status: COMPLETED | OUTPATIENT
Start: 2022-12-10 | End: 2022-12-10

## 2022-12-10 RX ADMIN — QUETIAPINE FUMARATE 25 MILLIGRAM(S): 200 TABLET, FILM COATED ORAL at 10:13

## 2022-12-10 RX ADMIN — Medication 3 MILLIGRAM(S): at 21:29

## 2022-12-10 RX ADMIN — RIVAROXABAN 15 MILLIGRAM(S): KIT at 21:29

## 2022-12-10 RX ADMIN — QUETIAPINE FUMARATE 25 MILLIGRAM(S): 200 TABLET, FILM COATED ORAL at 13:04

## 2022-12-10 RX ADMIN — QUETIAPINE FUMARATE 25 MILLIGRAM(S): 200 TABLET, FILM COATED ORAL at 21:29

## 2022-12-10 RX ADMIN — RIVAROXABAN 15 MILLIGRAM(S): KIT at 10:13

## 2022-12-10 NOTE — PROGRESS NOTE ADULT - ASSESSMENT
83 y/o female with pmhx of moderate AS, recent covid-19 and ecoli uti BIBA due to multiple lacerateions on arm from nail clipper admitted for further psych eval. Periods of agitation at home and in hospital. RRT on 11/28 for syncope and hypotension. Transferred to CCU, improved s/p fluid bolus.    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

## 2022-12-11 LAB — SARS-COV-2 RNA SPEC QL NAA+PROBE: SIGNIFICANT CHANGE UP

## 2022-12-11 PROCEDURE — 99232 SBSQ HOSP IP/OBS MODERATE 35: CPT

## 2022-12-11 RX ORDER — LANOLIN ALCOHOL/MO/W.PET/CERES
3 CREAM (GRAM) TOPICAL ONCE
Refills: 0 | Status: COMPLETED | OUTPATIENT
Start: 2022-12-11 | End: 2022-12-11

## 2022-12-11 RX ADMIN — Medication 3 MILLIGRAM(S): at 21:03

## 2022-12-11 RX ADMIN — QUETIAPINE FUMARATE 25 MILLIGRAM(S): 200 TABLET, FILM COATED ORAL at 09:22

## 2022-12-11 RX ADMIN — RIVAROXABAN 15 MILLIGRAM(S): KIT at 09:22

## 2022-12-11 RX ADMIN — RIVAROXABAN 15 MILLIGRAM(S): KIT at 21:02

## 2022-12-11 RX ADMIN — QUETIAPINE FUMARATE 25 MILLIGRAM(S): 200 TABLET, FILM COATED ORAL at 21:03

## 2022-12-11 NOTE — PROGRESS NOTE ADULT - ASSESSMENT
81 y/o female with pmhx of moderate AS, recent covid-19 and ecoli uti BIBA due to multiple lacerateions on arm from nail clipper admitted for further psych eval. Periods of agitation at home and in hospital. RRT on 11/28 for syncope and hypotension. Transferred to CCU, improved s/p fluid bolus.    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

## 2022-12-12 VITALS — HEART RATE: 79 BPM | SYSTOLIC BLOOD PRESSURE: 99 MMHG | DIASTOLIC BLOOD PRESSURE: 59 MMHG

## 2022-12-12 PROCEDURE — 99239 HOSP IP/OBS DSCHRG MGMT >30: CPT

## 2022-12-12 RX ADMIN — QUETIAPINE FUMARATE 25 MILLIGRAM(S): 200 TABLET, FILM COATED ORAL at 10:01

## 2022-12-12 RX ADMIN — RIVAROXABAN 15 MILLIGRAM(S): KIT at 10:01

## 2022-12-12 NOTE — PROGRESS NOTE ADULT - PROVIDER SPECIALTY LIST ADULT
Hospitalist
Critical Care
Critical Care
Hospitalist
Infectious Disease
Critical Care
Critical Care
Hospitalist
Infectious Disease
Infectious Disease
Critical Care
Hospitalist

## 2022-12-12 NOTE — PROGRESS NOTE ADULT - SUBJECTIVE AND OBJECTIVE BOX
Case signed out to Dr Jackson.
 81 y/o F recent admission to  11/11 - 11/24/2022 for management of acute infectious encephalopathy with hypoxic respiratory failure secondary to COVID19 complicated by multifocal PNA, E. coli UTI was BIBA from home for further evaluation and management of multiple lacerations to left forearm with a nail clipper. As per patient's daughter she was attempting to administer the patient's home medications as prescribed when the patient became upset, frustrated, and started cutting her left arm from her left wrist to her left elbow. In the ED the patient was evaluated by Psychiatry. As noted patient has no formal PPHx, and no history of SA/SIB. Additionally, on the patient's prior admission she was noted to have episodes of agitation (requiring prns of Haloperidol and Quetiapine). In the ED the patient was confused awake and alert oriented to person, and place. The patient's daughter reports that the patient has ongoing decline in memory for the last few years and states at times patient leaves the house to go on walks but hasn't gotten lost. The patient's daughter states that since last night and into this morning the patient has been increasingly more agitated, and verbally abusive to her family.    11/28: Called to a Rapid response for hypotension and syncope.  Patient transported to the CCU for hypotension.  11/30 Clinically stable, Case discussed on CCU rounds and signed out to the floor for transfer to the Hospitalist Service.  Dr Jackson  12.1: no distress, confused  12.2: agitated, confused, would not eat and would not take meds   12.3: somnolent, arousable, received Zyprexa yesterday      REVIEW OF SYSTEMS:  unable to obtain     Vital Signs Last 24 Hrs  T(C): 36.7 (02 Dec 2022 07:31), Max: 36.7 (02 Dec 2022 07:31)  T(F): 98.1 (02 Dec 2022 07:31), Max: 98.1 (02 Dec 2022 07:31)  HR: 55 (02 Dec 2022 07:31) (55 - 76)  BP: 134/69 (02 Dec 2022 07:31) (112/96 - 134/69)  BP(mean): 85 (02 Dec 2022 00:19) (85 - 100)  RR: 16 (02 Dec 2022 07:31) (16 - 18)  SpO2: 99% (02 Dec 2022 07:31) (97% - 99%)    Parameters below as of 02 Dec 2022 07:31  Patient On (Oxygen Delivery Method): room air            PHYSICAL EXAM:    GENERAL: NAD  HEENT:  NC/AT, EOMI, PERRLA, No scleral icterus, Moist mucous membranes  NECK: Supple, No JVD  CNS:  Alert & Oriented X1. Motor Strength 5/5 B/L upper and lower extremities; DTRs 2+ intact   LUNG: Normal Breath sounds, Clear to auscultation bilaterally, No rales, No rhonchi, No wheezing  HEART: RRR; No murmurs, No rubs  ABDOMEN: +BS, ST/ND/NT  GENITOURINARY: Voiding, Bladder not distended  EXTREMITIES:  2+ Peripheral Pulses, No clubbing, No cyanosis, No tibial edema, L forearm healing cuts   MUSCULOSKELTAL: Joints normal ROM, No TTP, No effusion  VAGINAL: deferred  SKIN: no rashes  RECTAL: deferred, not indicated  BREAST: deferred                          13.7   9.31  )-----------( 198      ( 30 Nov 2022 06:33 )             40.5     11-30    139  |  108  |  18  ----------------------------<  178<H>  3.6   |  24  |  0.79    Ca    8.3<L>      30 Nov 2022 06:33  Phos  2.8     11-30  Mg     2.2     11-30      Vancomycin levels: Vancomycin Level, Trough: 6.7 ug/mL (11-30 @ 11:41)    Cultures:     MEDICATIONS  (STANDING):  chlorhexidine 4% Liquid 1 Application(s) Topical <User Schedule>  glucagon  Injectable 1 milliGRAM(s) IntraMuscular once  insulin lispro (ADMELOG) corrective regimen sliding scale   SubCutaneous three times a day before meals  insulin lispro (ADMELOG) corrective regimen sliding scale   SubCutaneous at bedtime  predniSONE   Tablet 20 milliGRAM(s) Oral daily  QUEtiapine 25 milliGRAM(s) Oral two times a day  rivaroxaban 15 milliGRAM(s) Oral two times a day        all labs reviewed  all imaging reviewed    a/p:    1. Sepsis due to Uti: poa    2. Pseudomonas Uti:  Cefepime IV day#6/7    3. DM type II    4. Dementia with psychotic features   Psych evaluation noted  Seroquel 25mg Bid    will stop zyprexa due to excessive somnolence     5. 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      
83 y/o F recent admission to  11/11 - 11/24/2022 for management of acute infectious encephalopathy with hypoxic respiratory failure secondary to COVID19 complicated by multifocal PNA, E. coli UTI was BIBA from home for further evaluation and management of multiple lacerations to left forearm with a nail clipper. As per patient's daughter she was attempting to administer the patient's home medications as prescribed when the patient became upset, frustrated, and started cutting her left arm from her left wrist to her left elbow. In the ED the patient was evaluated by Psychiatry. As noted patient has no formal PPHx, and no history of SA/SIB. Additionally on the patient's prior admission she was noted to have episodes of agitation (requiring prns of Haloperidol and Quetiapine). In the ED the patient was confused awake and alert oriented to person, and place. The patient's daughter reports that the patient has ongoing decline in memory for the last few years and states at times patient leaves the house to go on walks but hasn't gotten lost. The patient's daughter states that since last night and into this morning the patient has been increasingly more agitated, and verbally abusive to her family.  RRT on 11/28 for syncope and hypotension. Transferred to CCU, improved s/p fluid bolus.   12/10 - no events overnight.  calm cooperative as per RN. no cp, sob  12/11 - calm cooperative. no overnight events   12/12 - calm cooperative today.  plan for dc to home with daughter as per KATHY.      ROS:   unable to obtain. somnolent    Vital Signs Last 24 Hrs  T(C): 36.6 (12 Dec 2022 07:56), Max: 36.6 (12 Dec 2022 07:56)  T(F): 97.9 (12 Dec 2022 07:56), Max: 97.9 (12 Dec 2022 07:56)  HR: 79 (12 Dec 2022 10:03) (79 - 91)  BP: 99/59 (12 Dec 2022 10:03) (91/65 - 108/66)  BP(mean): --  RR: 16 (12 Dec 2022 07:56) (16 - 18)  SpO2: 94% (12 Dec 2022 07:56) (94% - 98%)    Parameters below as of 12 Dec 2022 07:56  Patient On (Oxygen Delivery Method): room air      PHYSICAL EXAM:  GENERAL: NAD, lying in bed comfortably  HEAD:  Atraumatic, Normocephalic  EYES: conjunctiva and sclera clear  ENT: Moist mucous membranes  NECK: Supple, No JVD  CHEST/LUNG: Clear to auscultation bilaterally; No rales, rhonchi, wheezing, or rubs. Unlabored respirations  HEART: Regular rate and rhythm;   ABDOMEN: Bowel sounds present; Soft, Nontender, Nondistended.   EXTREMITIES:  2+ Peripheral Pulses, brisk capillary refill. No clubbing, cyanosis, or edema  NERVOUS SYSTEM: follows commands , awake, alert speech clear. No deficits   MSK: FROM all 4 extremities, full and equal strength  SKIN: dry linear scars to L forearm (old)          
83 y/o F recent admission to  11/11 - 11/24/2022 for management of acute infectious encephalopathy with hypoxic respiratory failure secondary to COVID19 complicated by multifocal PNA, E. coli UTI was BIBA from home for further evaluation and management of multiple lacerations to left forearm with a nail clipper. As per patient's daughter she was attempting to administer the patient's home medications as prescribed when the patient became upset, frustrated, and started cutting her left arm from her left wrist to her left elbow. In the ED the patient was evaluated by Psychiatry. As noted patient has no formal PPHx, and no history of SA/SIB. Additionally on the patient's prior admission she was noted to have episodes of agitation (requiring prns of Haloperidol and Quetiapine). In the ED the patient was confused awake and alert oriented to person, and place. The patient's daughter reports that the patient has ongoing decline in memory for the last few years and states at times patient leaves the house to go on walks but hasn't gotten lost. The patient's daughter states that since last night and into this morning the patient has been increasingly more agitated, and verbally abusive to her family.  RRT on 11/28 for syncope and hypotension. Transferred to CCU, improved s/p fluid bolus.   12/9   Subjective: Patient seen this AM, awake, alert following commands, doing well,    ROS denies CP, sob, abd pain, tolerating po ,wants to go home         PHYSICAL EXAM:    Daily     Daily     ICU Vital Signs Last 24 Hrs  T(C): 36.3 (09 Dec 2022 16:59), Max: 36.8 (09 Dec 2022 07:57)  T(F): 97.3 (09 Dec 2022 16:59), Max: 98.2 (09 Dec 2022 07:57)  HR: 84 (09 Dec 2022 16:59) (75 - 95)  BP: 120/71 (09 Dec 2022 16:59) (109/74 - 120/71)  BP(mean): --  ABP: --  ABP(mean): --  RR: 18 (09 Dec 2022 16:59) (17 - 18)  SpO2: 96% (09 Dec 2022 16:59) (93% - 97%)    O2 Parameters below as of 09 Dec 2022 16:59  Patient On (Oxygen Delivery Method): room air  PHYSICAL EXAM:  GENERAL: NAD, lying in bed comfortably  HEAD:  Atraumatic, Normocephalic  EYES: conjunctiva and sclera clear  ENT: Moist mucous membranes  NECK: Supple, No JVD  CHEST/LUNG: Clear to auscultation bilaterally; No rales, rhonchi, wheezing, or rubs. Unlabored respirations  HEART: Regular rate and rhythm;   ABDOMEN: Bowel sounds present; Soft, Nontender, Nondistended.   EXTREMITIES:  2+ Peripheral Pulses, brisk capillary refill. No clubbing, cyanosis, or edema  NERVOUS SYSTEM: follows commands , awake, alert speech clear. No deficits   MSK: FROM all 4 extremities, full and equal strength  SKIN: dry linear scars to L forearm (old)      MEDICATIONS  (STANDING):  dextrose 5%. 1000 milliLiter(s) (100 mL/Hr) IV Continuous <Continuous>  dextrose 5%. 1000 milliLiter(s) (50 mL/Hr) IV Continuous <Continuous>  dextrose 50% Injectable 25 Gram(s) IV Push once  dextrose 50% Injectable 12.5 Gram(s) IV Push once  dextrose 50% Injectable 25 Gram(s) IV Push once  glucagon  Injectable 1 milliGRAM(s) IntraMuscular once  insulin lispro (ADMELOG) corrective regimen sliding scale   SubCutaneous three times a day before meals  insulin lispro (ADMELOG) corrective regimen sliding scale   SubCutaneous at bedtime  QUEtiapine 25 milliGRAM(s) Oral two times a day  rivaroxaban 15 milliGRAM(s) Oral two times a day      
RN called to report that Pt is desating and cold hands. Pt was seen and examined at bedside, Pt is sating 93% on NC, Temp: 98 rectally, 107 /89 mm hg, HR: 78. VSS.     Pt appears comfortable.      ICU Vital Signs Last 24 Hrs  T(C): 37 (02 Dec 2022 21:38), Max: 37 (02 Dec 2022 17:10)  T(F): 98.6 (02 Dec 2022 21:38), Max: 98.6 (02 Dec 2022 17:10)  HR: 78 (02 Dec 2022 21:38) (55 - 78)  BP: 107/89 (02 Dec 2022 21:38) (107/89 - 134/69)  BP(mean): 85 (02 Dec 2022 00:19) (85 - 85  RR: 20 (02 Dec 2022 21:38) (16 - 20)  SpO2: 94% (02 Dec 2022 21:38) (93% - 99%)    O2 Parameters below as of 02 Dec 2022 21:38  Patient On (Oxygen Delivery Method): room air    D/w RN to continue to monitor. CBC/BMP ordered.     
 81 y/o F recent admission to  11/11 - 11/24/2022 for management of acute infectious encephalopathy with hypoxic respiratory failure secondary to COVID19 complicated by multifocal PNA, E. coli UTI was BIBA from home for further evaluation and management of multiple lacerations to left forearm with a nail clipper. As per patient's daughter she was attempting to administer the patient's home medications as prescribed when the patient became upset, frustrated, and started cutting her left arm from her left wrist to her left elbow. In the ED the patient was evaluated by Psychiatry. As noted patient has no formal PPHx, and no history of SA/SIB. Additionally, on the patient's prior admission she was noted to have episodes of agitation (requiring prns of Haloperidol and Quetiapine). In the ED the patient was confused awake and alert oriented to person, and place. The patient's daughter reports that the patient has ongoing decline in memory for the last few years and states at times patient leaves the house to go on walks but hasn't gotten lost. The patient's daughter states that since last night and into this morning the patient has been increasingly more agitated, and verbally abusive to her family.    11/28: Called to a Rapid response for hypotension and syncope.  Patient transported to the CCU for hypotension.  11/30 Clinically stable, Case discussed on CCU rounds and signed out to the floor for transfer to the Hospitalist Service.  Dr Jackson  12.1: no distress, confused  12.2: agitated, confused, would not eat and would not take meds   12.3: somnolent, arousable, received Zyprexa yesterday  12.4: alert, confused , got agitated in the afternoon       REVIEW OF SYSTEMS:  unable to obtain     Vital Signs Last 24 Hrs  T(C): 36.7 (02 Dec 2022 07:31), Max: 36.7 (02 Dec 2022 07:31)  T(F): 98.1 (02 Dec 2022 07:31), Max: 98.1 (02 Dec 2022 07:31)  HR: 55 (02 Dec 2022 07:31) (55 - 76)  BP: 134/69 (02 Dec 2022 07:31) (112/96 - 134/69)  BP(mean): 85 (02 Dec 2022 00:19) (85 - 100)  RR: 16 (02 Dec 2022 07:31) (16 - 18)  SpO2: 99% (02 Dec 2022 07:31) (97% - 99%)    Parameters below as of 02 Dec 2022 07:31  Patient On (Oxygen Delivery Method): room air            PHYSICAL EXAM:    GENERAL: NAD  HEENT:  NC/AT, EOMI, PERRLA, No scleral icterus, Moist mucous membranes  NECK: Supple, No JVD  CNS:  Alert & Oriented X1. Motor Strength 5/5 B/L upper and lower extremities; DTRs 2+ intact   LUNG: Normal Breath sounds, Clear to auscultation bilaterally, No rales, No rhonchi, No wheezing  HEART: RRR; No murmurs, No rubs  ABDOMEN: +BS, ST/ND/NT  GENITOURINARY: Voiding, Bladder not distended  EXTREMITIES:  2+ Peripheral Pulses, No clubbing, No cyanosis, No tibial edema, L forearm healing cuts   MUSCULOSKELTAL: Joints normal ROM, No TTP, No effusion  VAGINAL: deferred  SKIN: no rashes  RECTAL: deferred, not indicated  BREAST: deferred                          13.7   9.31  )-----------( 198      ( 30 Nov 2022 06:33 )             40.5     11-30    139  |  108  |  18  ----------------------------<  178<H>  3.6   |  24  |  0.79    Ca    8.3<L>      30 Nov 2022 06:33  Phos  2.8     11-30  Mg     2.2     11-30      Vancomycin levels: Vancomycin Level, Trough: 6.7 ug/mL (11-30 @ 11:41)    Cultures:     MEDICATIONS  (STANDING):  chlorhexidine 4% Liquid 1 Application(s) Topical <User Schedule>  glucagon  Injectable 1 milliGRAM(s) IntraMuscular once  insulin lispro (ADMELOG) corrective regimen sliding scale   SubCutaneous three times a day before meals  insulin lispro (ADMELOG) corrective regimen sliding scale   SubCutaneous at bedtime  predniSONE   Tablet 20 milliGRAM(s) Oral daily  QUEtiapine 25 milliGRAM(s) Oral two times a day  rivaroxaban 15 milliGRAM(s) Oral two times a day        all labs reviewed  all imaging reviewed    a/p:    1. Sepsis due to Uti: poa    2. Pseudomonas Uti:  Cefepime IV day#7/7    3. DM type II    4. Dementia with psychotic features   Psych evaluation noted  Seroquel 25mg Bid    will stop zyprexa due to excessive somnolence   Psych re evaluation     5. 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      
 81 y/o F recent admission to  11/11 - 11/24/2022 for management of acute infectious encephalopathy with hypoxic respiratory failure secondary to COVID19 complicated by multifocal PNA, E. coli UTI was BIBA from home for further evaluation and management of multiple lacerations to left forearm with a nail clipper. As per patient's daughter she was attempting to administer the patient's home medications as prescribed when the patient became upset, frustrated, and started cutting her left arm from her left wrist to her left elbow. In the ED the patient was evaluated by Psychiatry. As noted patient has no formal PPHx, and no history of SA/SIB. Additionally, on the patient's prior admission she was noted to have episodes of agitation (requiring prns of Haloperidol and Quetiapine). In the ED the patient was confused awake and alert oriented to person, and place. The patient's daughter reports that the patient has ongoing decline in memory for the last few years and states at times patient leaves the house to go on walks but hasn't gotten lost. The patient's daughter states that since last night and into this morning the patient has been increasingly more agitated, and verbally abusive to her family.    11/28: Called to a Rapid response for hypotension and syncope.  Patient transported to the CCU for hypotension.  11/30 Clinically stable, Case discussed on CCU rounds and signed out to the floor for transfer to the Hospitalist Service.  Dr Jackson  12.1: no distress, confused  12.2: agitated, confused, would not eat and would not take meds   12.3: somnolent, arousable, received Zyprexa yesterday  12.4: alert, confused , got agitated in the afternoon   12.5: pleasant on my exam, smiling, reading a book ; confused at times with bizarre behaviour , por appetite     REVIEW OF SYSTEMS:  unable to obtain     Vital Signs Last 24 Hrs  T(C): 36.7 (02 Dec 2022 07:31), Max: 36.7 (02 Dec 2022 07:31)  T(F): 98.1 (02 Dec 2022 07:31), Max: 98.1 (02 Dec 2022 07:31)  HR: 55 (02 Dec 2022 07:31) (55 - 76)  BP: 134/69 (02 Dec 2022 07:31) (112/96 - 134/69)  BP(mean): 85 (02 Dec 2022 00:19) (85 - 100)  RR: 16 (02 Dec 2022 07:31) (16 - 18)  SpO2: 99% (02 Dec 2022 07:31) (97% - 99%)    Parameters below as of 02 Dec 2022 07:31  Patient On (Oxygen Delivery Method): room air            PHYSICAL EXAM:    GENERAL: NAD  HEENT:  NC/AT, EOMI, PERRLA, No scleral icterus, Moist mucous membranes  NECK: Supple, No JVD  CNS:  Alert & Oriented X1. Motor Strength 5/5 B/L upper and lower extremities; DTRs 2+ intact   LUNG: Normal Breath sounds, Clear to auscultation bilaterally, No rales, No rhonchi, No wheezing  HEART: RRR; No murmurs, No rubs  ABDOMEN: +BS, ST/ND/NT  GENITOURINARY: Voiding, Bladder not distended  EXTREMITIES:  2+ Peripheral Pulses, No clubbing, No cyanosis, No tibial edema, L forearm healing cuts   MUSCULOSKELTAL: Joints normal ROM, No TTP, No effusion  VAGINAL: deferred  SKIN: no rashes  RECTAL: deferred, not indicated  BREAST: deferred                          13.7   9.31  )-----------( 198      ( 30 Nov 2022 06:33 )             40.5     11-30    139  |  108  |  18  ----------------------------<  178<H>  3.6   |  24  |  0.79    Ca    8.3<L>      30 Nov 2022 06:33  Phos  2.8     11-30  Mg     2.2     11-30      Vancomycin levels: Vancomycin Level, Trough: 6.7 ug/mL (11-30 @ 11:41)    Cultures:     MEDICATIONS  (STANDING):  chlorhexidine 4% Liquid 1 Application(s) Topical <User Schedule>  glucagon  Injectable 1 milliGRAM(s) IntraMuscular once  insulin lispro (ADMELOG) corrective regimen sliding scale   SubCutaneous three times a day before meals  insulin lispro (ADMELOG) corrective regimen sliding scale   SubCutaneous at bedtime  predniSONE   Tablet 20 milliGRAM(s) Oral daily  QUEtiapine 25 milliGRAM(s) Oral two times a day  rivaroxaban 15 milliGRAM(s) Oral two times a day        all labs reviewed  all imaging reviewed    a/p:    1. Sepsis due to Uti: poa    2. Pseudomonas Uti:  s/p Cefepime IV day#7/7    3. DM type II    4. Dementia with psychotic features   Psych evaluation noted  Seroquel 25mg Bid    will stop zyprexa due to excessive somnolence   Psych re evaluation   needs CO  5. 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      
81 y/o F recent admission to   - 2022 for management of acute infectious encephalopathy with hypoxic respiratory failure secondary to COVID19 complicated by multifocal PNA, E. coli UTI was BIBA from home for further evaluation and management of multiple lacerations to left forearm with a nail clipper. As per patient's daughter she was attempting to administer the patient's home medications as prescribed when the patient became upset, frustrated, and started cutting her left arm from her left wrist to her left elbow. In the ED the patient was evaluated by Psychiatry. As noted patient has no formal PPHx, and no history of SA/SIB. Additionally on the patient's prior admission she was noted to have episodes of agitation (requiring prns of Haloperidol and Quetiapine). In the ED the patient was confused awake and alert oriented to person, and place. The patient's daughter reports that the patient has ongoing decline in memory for the last few years and states at times patient leaves the house to go on walks but hasn't gotten lost. The patient's daughter states that since last night and into this morning the patient has been increasingly more agitated, and verbally abusive to her family.  Vital signs in triage => /76, HR 71/min, RR 16/min, TMax 98.4'F, and SpO2 96% on room air. Labs => Na 134, BUN//Cr 24/1.23, Glu 329, Alb 2.9, HgbA1C 7.9, (+)UA. CT Head => No evidence of acute intracranial hemorrhage or hydrocephalus. Atrophy with white matter ischemic changes. In the ED the patient was given Cephalexin 500mg PO x 1, Ceftriaxone 1g IVPB x 1, Quetiapine 25mg PO x 1, Haloperidol 1mg PO x 1, and NS x 2L    ROS.All other review of systems negative, except as noted in HPI     afebrile, on room air, comfortable, awake, pleasant cooperative this morning , denies abd pain, N/V/SOB/CP      PHYSICAL EXAM:    Daily     Daily     ICU Vital Signs Last 24 Hrs  T(C): 36.6 (2022 08:55), Max: 36.9 (2022 18:49)  T(F): 97.9 (2022 08:55), Max: 98.4 (2022 18:49)  HR: 74 (2022 08:55) (71 - 85)  BP: 159/89 (2022 08:55) (137/78 - 159/89)  BP(mean): --  ABP: --  ABP(mean): --  RR: 19 (2022 08:55) (16 - 19)  SpO2: 94% (2022 08:55) (94% - 98%)    O2 Parameters below as of 2022 08:55  Patient On (Oxygen Delivery Method): room air            Constitutional: NAD  HEENT: Atraumatic, CECIL, Normal, No congestion  Respiratory:bibasilar crackles   Cardiovascular: N S1S2;  Gastrointestinal: Abdomen soft, non tender, Bowel Ssounds present  Extremities: No edema, peripheral pulses present  Neurological: AAO x 3, no gross focal motor deficits  Skin:-LUE skin superficial lacerations                           12.2   11.30 )-----------( 226      ( 2022 11:07 )             34.8       CBC Full  -  ( 2022 11:07 )  WBC Count : 11.30 K/uL  RBC Count : 4.27 M/uL  Hemoglobin : 12.2 g/dL  Hematocrit : 34.8 %  Platelet Count - Automated : 226 K/uL  Mean Cell Volume : 81.5 fl  Mean Cell Hemoglobin : 28.6 pg  Mean Cell Hemoglobin Concentration : 35.1 gm/dL  Auto Neutrophil # : 9.45 K/uL  Auto Lymphocyte # : 1.26 K/uL  Auto Monocyte # : 0.44 K/uL  Auto Eosinophil # : 0.03 K/uL  Auto Basophil # : 0.01 K/uL  Auto Neutrophil % : 83.5 %  Auto Lymphocyte % : 11.2 %  Auto Monocyte % : 3.9 %  Auto Eosinophil % : 0.3 %  Auto Basophil % : 0.1 %          139  |  108  |  19  ----------------------------<  191<H>  3.8   |  22  |  0.83    Ca    8.4<L>      2022 11:07    TPro  6.6  /  Alb  2.9<L>  /  TBili  0.5  /  DBili  x   /  AST  31  /  ALT  49  /  AlkPhos  209<H>        LIVER FUNCTIONS - ( 2022 12:40 )  Alb: 2.9 g/dL / Pro: 6.6 gm/dL / ALK PHOS: 209 U/L / ALT: 49 U/L / AST: 31 U/L / GGT: x                       Urinalysis Basic - ( 2022 12:40 )    Color: Yellow / Appearance: Clear / S.020 / pH: x  Gluc: x / Ketone: Negative  / Bili: Negative / Urobili: Negative   Blood: x / Protein: 15 / Nitrite: Negative   Leuk Esterase: Moderate / RBC: 0-2 /HPF / WBC 26-50   Sq Epi: x / Non Sq Epi: x / Bacteria: Few            MEDICATIONS  (STANDING):  cefTRIAXone Injectable. 1000 milliGRAM(s) IV Push every 24 hours  dextrose 5%. 1000 milliLiter(s) (50 mL/Hr) IV Continuous <Continuous>  dextrose 5%. 1000 milliLiter(s) (100 mL/Hr) IV Continuous <Continuous>  dextrose 50% Injectable 25 Gram(s) IV Push once  dextrose 50% Injectable 12.5 Gram(s) IV Push once  dextrose 50% Injectable 25 Gram(s) IV Push once  glucagon  Injectable 1 milliGRAM(s) IntraMuscular once  insulin lispro (ADMELOG) corrective regimen sliding scale   SubCutaneous three times a day before meals  insulin lispro (ADMELOG) corrective regimen sliding scale   SubCutaneous at bedtime  predniSONE   Tablet 40 milliGRAM(s) Oral daily  QUEtiapine 25 milliGRAM(s) Oral two times a day  rivaroxaban 15 milliGRAM(s) Oral two times a day      
81 y/o F recent admission to  11/11 - 11/24/2022 for management of acute infectious encephalopathy with hypoxic respiratory failure secondary to COVID19 complicated by multifocal PNA, E. coli UTI was BIBA from home for further evaluation and management of multiple lacerations to left forearm with a nail clipper. As per patient's daughter she was attempting to administer the patient's home medications as prescribed when the patient became upset, frustrated, and started cutting her left arm from her left wrist to her left elbow. In the ED the patient was evaluated by Psychiatry. As noted patient has no formal PPHx, and no history of SA/SIB. Additionally on the patient's prior admission she was noted to have episodes of agitation (requiring prns of Haloperidol and Quetiapine). In the ED the patient was confused awake and alert oriented to person, and place. The patient's daughter reports that the patient has ongoing decline in memory for the last few years and states at times patient leaves the house to go on walks but hasn't gotten lost. The patient's daughter states that since last night and into this morning the patient has been increasingly more agitated, and verbally abusive to her family.  RRT on 11/28 for syncope and hypotension. Transferred to CCU, improved s/p fluid bolus.   12/10 - no events overnight.  calm cooperative as per RN. no cp, sob  12/11 - calm cooperative. no overnight events     ROS:   unable to obtain. somnolent      Vital Signs Last 24 Hrs  T(C): 36.3 (11 Dec 2022 09:31), Max: 36.8 (10 Dec 2022 21:16)  T(F): 97.3 (11 Dec 2022 09:31), Max: 98.2 (10 Dec 2022 21:16)  HR: 80 (11 Dec 2022 09:31) (80 - 107)  BP: 104/80 (11 Dec 2022 09:31) (104/80 - 120/76)  BP(mean): --  RR: 18 (11 Dec 2022 09:31) (18 - 19)  SpO2: 97% (11 Dec 2022 09:31) (89% - 97%)    Parameters below as of 10 Dec 2022 21:16  Patient On (Oxygen Delivery Method): room air      PHYSICAL EXAM:  GENERAL: NAD, lying in bed comfortably  HEAD:  Atraumatic, Normocephalic  EYES: conjunctiva and sclera clear  ENT: Moist mucous membranes  NECK: Supple, No JVD  CHEST/LUNG: Clear to auscultation bilaterally; No rales, rhonchi, wheezing, or rubs. Unlabored respirations  HEART: Regular rate and rhythm;   ABDOMEN: Bowel sounds present; Soft, Nontender, Nondistended.   EXTREMITIES:  2+ Peripheral Pulses, brisk capillary refill. No clubbing, cyanosis, or edema  NERVOUS SYSTEM: follows commands , awake, alert speech clear. No deficits   MSK: FROM all 4 extremities, full and equal strength  SKIN: dry linear scars to L forearm (old)      
81 y/o F recent admission to  11/11 - 11/24/2022 for management of acute infectious encephalopathy with hypoxic respiratory failure secondary to COVID19 complicated by multifocal PNA, E. coli UTI was BIBA from home for further evaluation and management of multiple lacerations to left forearm with a nail clipper. As per patient's daughter she was attempting to administer the patient's home medications as prescribed when the patient became upset, frustrated, and started cutting her left arm from her left wrist to her left elbow. In the ED the patient was evaluated by Psychiatry. As noted patient has no formal PPHx, and no history of SA/SIB. Additionally on the patient's prior admission she was noted to have episodes of agitation (requiring prns of Haloperidol and Quetiapine). In the ED the patient was confused awake and alert oriented to person, and place. The patient's daughter reports that the patient has ongoing decline in memory for the last few years and states at times patient leaves the house to go on walks but hasn't gotten lost. The patient's daughter states that since last night and into this morning the patient has been increasingly more agitated, and verbally abusive to her family.  Vital signs in triage => /76, HR 71/min, RR 16/min, TMax 98.4'F, and SpO2 96% on room air. Labs => Na 134, BUN//Cr 24/1.23, Glu 329, Alb 2.9, HgbA1C 7.9, (+)UA. CT Head => No evidence of acute intracranial hemorrhage or hydrocephalus. Atrophy with white matter ischemic changes. In the ED the patient was given Cephalexin 500mg PO x 1, Ceftriaxone 1g IVPB x 1, Quetiapine 25mg PO x 1, Haloperidol 1mg PO x 1, and NS x 2L    ROS.All other review of systems negative, except as noted in HPI    11/28- patient was seen at 10am- oriented x3- VSS- denies SI but was asking for nail clippers. at 12- RRT- for hypotension and lethargy- likley syncopal episode.           PHYSICAL EXAM:    Daily     Vital Signs Last 24 Hrs  T(C): 37 (28 Nov 2022 08:13), Max: 37.3 (28 Nov 2022 06:06)  T(F): 98.6 (28 Nov 2022 08:13), Max: 99.1 (28 Nov 2022 06:06)  HR: 74 (28 Nov 2022 08:13) (70 - 84)  BP: 142/88 (28 Nov 2022 08:13) (141/74 - 161/79)  BP(mean): --  RR: 19 (28 Nov 2022 08:13) (16 - 20)  SpO2: 93% (28 Nov 2022 08:13) (93% - 96%)    Parameters below as of 28 Nov 2022 08:13  Patient On (Oxygen Delivery Method): room air    PE:  Constitutional: NAD, laying in bed  HEENT: NC/AT  Back: no tenderness  Respiratory: respirations even and non labored, LCTA  Cardiovascular: S1S2 regular, no murmurs  Abdomen: soft, not tender, not distended, positive BS  Genitourinary: voiding  Musculoskeletal: no muscle tenderness, no joint swelling or tenderness  Extremities: no pedal edema   Neurological: no focal deficits      MEDICATIONS  (STANDING):  cefepime   IVPB 2000 milliGRAM(s) IV Intermittent every 12 hours  chlorhexidine 4% Liquid 1 Application(s) Topical <User Schedule>  dextrose 5%. 1000 milliLiter(s) (50 mL/Hr) IV Continuous <Continuous>  dextrose 5%. 1000 milliLiter(s) (100 mL/Hr) IV Continuous <Continuous>  dextrose 50% Injectable 25 Gram(s) IV Push once  dextrose 50% Injectable 12.5 Gram(s) IV Push once  dextrose 50% Injectable 25 Gram(s) IV Push once  glucagon  Injectable 1 milliGRAM(s) IntraMuscular once  insulin lispro (ADMELOG) corrective regimen sliding scale   SubCutaneous three times a day before meals  insulin lispro (ADMELOG) corrective regimen sliding scale   SubCutaneous at bedtime  lactated ringers Bolus 1000 milliLiter(s) IV Bolus once  predniSONE   Tablet 40 milliGRAM(s) Oral daily  QUEtiapine 25 milliGRAM(s) Oral two times a day  rivaroxaban 15 milliGRAM(s) Oral two times a day  sodium chloride 0.9% Bolus 1000 milliLiter(s) IV Bolus once  vancomycin  IVPB 1000 milliGRAM(s) IV Intermittent every 24 hours    MEDICATIONS  (PRN):  albuterol    90 MICROgram(s) HFA Inhaler 2 Puff(s) Inhalation every 6 hours PRN Shortness of Breath and/or Wheezing  dextrose Oral Gel 15 Gram(s) Oral once PRN Blood Glucose LESS THAN 70 milliGRAM(s)/deciliter  haloperidol     Tablet 1 milliGRAM(s) Oral every 8 hours PRN agitation      11-28    134<L>  |  104  |  16  ----------------------------<  142<H>  4.3   |  20<L>  |  0.86    Ca    8.7      28 Nov 2022 06:13                              15.4   12.92 )-----------( 225      ( 28 Nov 2022 06:13 )             44.3       < from: CT Head No Cont (11.26.22 @ 14:00) >    CT BRAIN                          PROCEDURE DATE:  11/26/2022          INTERPRETATION:  CT HEAD    CLINICAL HISTORY: AMS    TECHNIQUE:  Noncontrast CT.  Axial Acquisition.  Sagittal and coronal reformations.    COMPARISON:  Compared to study dated 11/17/2022    FINDINGS:  *  HEMORRHAGE:  No evidence of intracranial hemorrhage.  *  BRAIN PARENCHYMA:  Mild to moderate atrophy. Mild periventricular   white matter ischemic changes  No mass or mass effect.  *  VENTRICLES / SHIFT:  No hydrocephalus. No midline shift.  *  EXTRA-AXIAL / BASAL CISTERNS:  No extra-axial mass. Basal cisterns   preserved.  Atherosclerotic calcifications of the cavernous internal   carotid arteries.  *  CALVARIUM AND EXTRACRANIAL SOFT TISSUES:  No depressed calvarial   fracture.  *  SINUSES, ORBITS, MASTOIDS:  The visualized paranasal sinuses and   mastoid air cells are well aerated.    IMPRESSION:  NO EVIDENCE OF ACUTE INTRACRANIAL HEMORRHAGE OR HYDROCEPHALUS.  ATROPHY   WITH WHITE MATTER ISCHEMIC CHANGES.    < end of copied text >  
83 y/o F recent admission to  11/11 - 11/24/2022 for management of acute infectious encephalopathy with hypoxic respiratory failure secondary to COVID19 complicated by multifocal PNA, E. coli UTI was BIBA from home for further evaluation and management of multiple lacerations to left forearm with a nail clipper. As per patient's daughter she was attempting to administer the patient's home medications as prescribed when the patient became upset, frustrated, and started cutting her left arm from her left wrist to her left elbow. In the ED the patient was evaluated by Psychiatry. As noted patient has no formal PPHx, and no history of SA/SIB. Additionally on the patient's prior admission she was noted to have episodes of agitation (requiring prns of Haloperidol and Quetiapine). In the ED the patient was confused awake and alert oriented to person, and place. The patient's daughter reports that the patient has ongoing decline in memory for the last few years and states at times patient leaves the house to go on walks but hasn't gotten lost. The patient's daughter states that since last night and into this morning the patient has been increasingly more agitated, and verbally abusive to her family.  RRT on 11/28 for syncope and hypotension. Transferred to CCU, improved s/p fluid bolus.   12/10 - no events overnight.  calm cooperative as per RN. no cp, sob    ROS:   All 10 systems reviewed and found to be negative with the exception of what has been described above.    Vital Signs Last 24 Hrs  T(C): 36.6 (10 Dec 2022 07:00), Max: 36.6 (10 Dec 2022 07:00)  T(F): 97.9 (10 Dec 2022 07:00), Max: 97.9 (10 Dec 2022 07:00)  HR: 75 (10 Dec 2022 07:00) (75 - 84)  BP: 119/75 (10 Dec 2022 07:00) (119/75 - 120/71)  BP(mean): --  RR: 18 (10 Dec 2022 07:00) (18 - 18)  SpO2: 100% (10 Dec 2022 07:00) (96% - 100%)    Parameters below as of 10 Dec 2022 07:00  Patient On (Oxygen Delivery Method): room air      PHYSICAL EXAM:  GENERAL: NAD, lying in bed comfortably  HEAD:  Atraumatic, Normocephalic  EYES: conjunctiva and sclera clear  ENT: Moist mucous membranes  NECK: Supple, No JVD  CHEST/LUNG: Clear to auscultation bilaterally; No rales, rhonchi, wheezing, or rubs. Unlabored respirations  HEART: Regular rate and rhythm;   ABDOMEN: Bowel sounds present; Soft, Nontender, Nondistended.   EXTREMITIES:  2+ Peripheral Pulses, brisk capillary refill. No clubbing, cyanosis, or edema  NERVOUS SYSTEM: follows commands , awake, alert speech clear. No deficits   MSK: FROM all 4 extremities, full and equal strength  SKIN: dry linear scars to L forearm (old)    
Date of service: 12-01-22 @ 13:25    Lying in bed in NAD  Alert and confused  Denies pain    ROS: no fever or chills; limited    MEDICATIONS  (STANDING):  cefepime   IVPB 2000 milliGRAM(s) IV Intermittent every 12 hours  chlorhexidine 4% Liquid 1 Application(s) Topical <User Schedule>  dextrose 5%. 1000 milliLiter(s) (100 mL/Hr) IV Continuous <Continuous>  dextrose 5%. 1000 milliLiter(s) (50 mL/Hr) IV Continuous <Continuous>  dextrose 50% Injectable 25 Gram(s) IV Push once  dextrose 50% Injectable 12.5 Gram(s) IV Push once  dextrose 50% Injectable 25 Gram(s) IV Push once  glucagon  Injectable 1 milliGRAM(s) IntraMuscular once  insulin lispro (ADMELOG) corrective regimen sliding scale   SubCutaneous three times a day before meals  insulin lispro (ADMELOG) corrective regimen sliding scale   SubCutaneous at bedtime  OLANZapine Injectable 2.5 milliGRAM(s) IntraMuscular once  predniSONE   Tablet 30 milliGRAM(s) Oral daily  QUEtiapine 25 milliGRAM(s) Oral two times a day  rivaroxaban 15 milliGRAM(s) Oral two times a day    Vital Signs Last 24 Hrs  T(C): 35.6 (01 Dec 2022 04:00), Max: 36.5 (30 Nov 2022 17:31)  T(F): 96 (01 Dec 2022 04:00), Max: 97.7 (30 Nov 2022 17:31)  HR: 55 (01 Dec 2022 08:00) (55 - 85)  BP: 102/48 (01 Dec 2022 08:00) (102/48 - 133/75)  BP(mean): 62 (01 Dec 2022 08:00) (62 - 89)  RR: 16 (01 Dec 2022 08:00) (16 - 22)  SpO2: 95% (01 Dec 2022 08:00) (95% - 97%)    Parameters below as of 01 Dec 2022 08:00  Patient On (Oxygen Delivery Method): room air     Physical exam:    Constitutional:  No acute distress  HEENT: NC/AT, EOMI, PERRLA, conjunctivae clear; ears and nose atraumatic  Neck: supple; thyroid not palpable  Back: no tenderness  Respiratory: respiratory effort normal; few crackles at bases  Cardiovascular: S1S2 regular, no murmurs  Abdomen: soft, not tender, not distended, positive BS  Genitourinary: no suprapubic tenderness  Lymphatic: no LN palpable  Musculoskeletal: no muscle tenderness, no joint swelling or tenderness  Extremities: no pedal edema  Neurological/ Psychiatric: alert, judgement and insight impaired; moving all extremities  Skin: no rashes; no palpable lesions    Labs: reviewed                        13.7   9.31  )-----------( 198      ( 30 Nov 2022 06:33 )             40.5     11-30    139  |  108  |  18  ----------------------------<  178<H>  3.6   |  24  |  0.79    Ca    8.3<L>      30 Nov 2022 06:33  Phos  2.8     11-30  Mg     2.2     11-30    Vancomycin Level, Trough: 6.7 ug/mL (11-30 @ 11:41)    D-Dimer Assay, Quantitative: 2099 ng/mL DDU (11-24-22 @ 08:36)  D-Dimer Assay, Quantitative: 2673 ng/mL DDU (11-23-22 @ 10:44)  Ferritin, Serum: 226 ng/mL (11-23-22 @ 10:44)  C-Reactive Protein, Serum: 22 mg/L (11-23-22 @ 10:44)  D-Dimer Assay, Quantitative: 2584 ng/mL DDU (11-22-22 @ 06:35)  D-Dimer Assay, Quantitative: 2392 ng/mL DDU (11-20-22 @ 17:21)                        11.8   9.78  )-----------( 219      ( 28 Nov 2022 13:34 )             33.8     11-28    138  |  110<H>  |  17  ----------------------------<  197<H>  3.6   |  21<L>  |  0.91    Ca    8.2<L>      28 Nov 2022 13:34  Phos  2.4     11-28  Mg     2.0     11-28    TPro  6.1  /  Alb  2.4<L>  /  TBili  0.5  /  DBili  x   /  AST  22  /  ALT  34  /  AlkPhos  146<H>  11-28     LIVER FUNCTIONS - ( 28 Nov 2022 13:34 )  Alb: 2.4 g/dL / Pro: 6.1 gm/dL / ALK PHOS: 146 U/L / ALT: 34 U/L / AST: 22 U/L / GGT: x             Culture - Blood (collected 26 Nov 2022 17:01)  Source: .Blood None  Preliminary Report (28 Nov 2022 02:03):    No growth to date.    Culture - Blood (collected 26 Nov 2022 17:01)  Source: .Blood None  Preliminary Report (28 Nov 2022 02:03):    No growth to date.    Culture - Urine (collected 26 Nov 2022 12:40)  Source: Clean Catch None  Final Report (28 Nov 2022 19:11):    10,000 - 49,000 CFU/mL Enterococcus faecalis    10,000 - 49,000 CFU/mL Pseudomonas aeruginosa  Organism: Enterococcus faecalis  Pseudomonas aeruginosa (28 Nov 2022 19:11)  Organism: Pseudomonas aeruginosa (28 Nov 2022 19:11)      -  Amikacin: S <=16      -  Aztreonam: S <=4      -  Cefepime: S <=2      -  Ceftazidime: S 4      -  Ciprofloxacin: I 1      -  Gentamicin: S 4      -  Imipenem: S 2      -  Levofloxacin: S <=0.5      -  Meropenem: S <=1      -  Piperacillin/Tazobactam: S <=8      -  Tobramycin: S <=2      Method Type: MIGUEL  Organism: Enterococcus faecalis (28 Nov 2022 19:11)      -  Ampicillin: S <=2 Predicts results to ampicillin/sulbactam, amoxacillin-clavulanate and  piperacillin-tazobactam.      -  Ciprofloxacin: S <=1      -  Levofloxacin: S <=1      -  Nitrofurantoin: S <=32 Should not be used to treat pyelonephritis.      -  Tetracycline: R >8      -  Vancomycin: S 2      Method Type: MIGUEL    Radiology: all available radiological tests reviewed    < from: Xray Chest 1 View- PORTABLE-Routine (Xray Chest 1 View- PORTABLE-Routine in AM.) (11.24.22 @ 08:46) >  Progression then improvement of patchy infiltrates.    < end of copied text >      Advanced directives addressed: full resuscitation
HPI: 83 y/o F recent admission to  11/11 - 11/24/2022 for management of acute infectious encephalopathy with hypoxic respiratory failure secondary to COVID19 complicated by multifocal PNA, E. coli UTI was BIBA from home for further evaluation and management of multiple lacerations to left forearm with a nail clipper. As per patient's daughter she was attempting to administer the patient's home medications as prescribed when the patient became upset, frustrated, and started cutting her left arm from her left wrist to her left elbow. In the ED the patient was evaluated by Psychiatry. As noted patient has no formal PPHx, and no history of SA/SIB. Additionally on the patient's prior admission she was noted to have episodes of agitation (requiring prns of Haloperidol and Quetiapine). In the ED the patient was confused awake and alert oriented to person, and place. The patient's daughter reports that the patient has ongoing decline in memory for the last few years and states at times patient leaves the house to go on walks but hasn't gotten lost. The patient's daughter states that since last night and into this morning the patient has been increasingly more agitated, and verbally abusive to her family.  RRT on 11/28 for syncope and hypotension. Transferred to CCU, improved s/p fluid bolus.      Subjective: Patient seen this AM, awake, alert and oriented x 3, doing well, on CO still.     REVIEW OF SYSTEMS:    CONSTITUTIONAL: No weakness, fevers or chills  EYES/ENT: No visual changes;  No vertigo or throat pain   NECK: No pain or stiffness  RESPIRATORY: No cough, wheezing, hemoptysis; No shortness of breath  CARDIOVASCULAR: No chest pain or palpitations  GASTROINTESTINAL: No abdominal or epigastric pain. No nausea, vomiting, or hematemesis; No diarrhea or constipation. No melena or hematochezia.  GENITOURINARY: No dysuria, frequency or hematuria  NEUROLOGICAL: No numbness or weakness  SKIN: No itching, rashes    Vital Signs Last 24 Hrs  T(C): 36.3 (07 Dec 2022 15:37), Max: 36.9 (07 Dec 2022 08:00)  T(F): 97.3 (07 Dec 2022 15:37), Max: 98.5 (07 Dec 2022 08:00)  HR: 88 (07 Dec 2022 15:37) (82 - 88)  BP: 109/74 (07 Dec 2022 15:37) (109/74 - 114/65)  RR: 18 (07 Dec 2022 15:37) (18 - 18)  SpO2: 95% (07 Dec 2022 15:37) (95% - 98%)    Parameters below as of 07 Dec 2022 15:37  Patient On (Oxygen Delivery Method): room air    PHYSICAL EXAM:  GENERAL: NAD, lying in bed comfortably  HEAD:  Atraumatic, Normocephalic  EYES: conjunctiva and sclera clear  ENT: Moist mucous membranes  NECK: Supple, No JVD  CHEST/LUNG: Clear to auscultation bilaterally; No rales, rhonchi, wheezing, or rubs. Unlabored respirations  HEART: Regular rate and rhythm; No murmurs, rubs, or gallops  ABDOMEN: Bowel sounds present; Soft, Nontender, Nondistended.   EXTREMITIES:  2+ Peripheral Pulses, brisk capillary refill. No clubbing, cyanosis, or edema  NERVOUS SYSTEM:  Alert & Oriented X3, speech clear. No deficits   MSK: FROM all 4 extremities, full and equal strength  SKIN: dry linear scars to L forearm (old)      LABS:                CAPILLARY BLOOD GLUCOSE      POCT Blood Glucose.: 122 mg/dL (07 Dec 2022 16:54)  POCT Blood Glucose.: 199 mg/dL (07 Dec 2022 12:27)  POCT Blood Glucose.: 112 mg/dL (07 Dec 2022 07:59)  POCT Blood Glucose.: 106 mg/dL (06 Dec 2022 21:09)            RADIOLOGY:        
Patient is a 82y old  Female who presents with a chief complaint of confusion (28 Nov 2022 15:37)      BRIEF HOSPITAL COURSE: 81 y/o female with pmhx of moderate AS, recent covid-19 and ecoli uti BIBA due to multiple lacerateions on arm from nail clipper admitted for further psych eval. periods of agitation at home and in hospital.     RRT on 11/28 for syncope and hypotension. trasnferred to CCU, improved s/p fluid bolus.    Events last 24 hours: acute agitated delirium. improved with 2.5 IM zyprexa    PAST MEDICAL & SURGICAL HISTORY:  2019 novel coronavirus disease (COVID-19)      E. coli UTI (urinary tract infection)      Moderate aortic stenosis      No significant past surgical history          Review of Systems:  unable to obtain due to AMS    Medications:  cefepime   IVPB 2000 milliGRAM(s) IV Intermittent every 12 hours  vancomycin  IVPB 1000 milliGRAM(s) IV Intermittent every 24 hours      albuterol    90 MICROgram(s) HFA Inhaler 2 Puff(s) Inhalation every 6 hours PRN    haloperidol     Tablet 1 milliGRAM(s) Oral every 8 hours PRN  haloperidol    Injectable 1 milliGRAM(s) IntraMuscular every 6 hours PRN  OLANZapine Injectable 2.5 milliGRAM(s) IntraMuscular once  QUEtiapine 25 milliGRAM(s) Oral two times a day      rivaroxaban 15 milliGRAM(s) Oral two times a day        dextrose 50% Injectable 25 Gram(s) IV Push once  dextrose 50% Injectable 12.5 Gram(s) IV Push once  dextrose 50% Injectable 25 Gram(s) IV Push once  dextrose Oral Gel 15 Gram(s) Oral once PRN  glucagon  Injectable 1 milliGRAM(s) IntraMuscular once  insulin lispro (ADMELOG) corrective regimen sliding scale   SubCutaneous three times a day before meals  insulin lispro (ADMELOG) corrective regimen sliding scale   SubCutaneous at bedtime  predniSONE   Tablet 40 milliGRAM(s) Oral daily    dextrose 5%. 1000 milliLiter(s) IV Continuous <Continuous>  dextrose 5%. 1000 milliLiter(s) IV Continuous <Continuous>      chlorhexidine 4% Liquid 1 Application(s) Topical <User Schedule>            ICU Vital Signs Last 24 Hrs  T(C): 36.1 (29 Nov 2022 00:51), Max: 37 (28 Nov 2022 08:13)  T(F): 97 (29 Nov 2022 00:51), Max: 98.6 (28 Nov 2022 08:13)  HR: 52 (29 Nov 2022 05:00) (52 - 104)  BP: 113/51 (29 Nov 2022 05:00) (79/45 - 176/103)  BP(mean): 67 (29 Nov 2022 05:00) (53 - 118)  ABP: --  ABP(mean): --  RR: 13 (29 Nov 2022 05:00) (13 - 23)  SpO2: 100% (29 Nov 2022 05:00) (93% - 100%)    O2 Parameters below as of 28 Nov 2022 16:00  Patient On (Oxygen Delivery Method): room air                I&O's Detail    28 Nov 2022 07:01  -  29 Nov 2022 06:19  --------------------------------------------------------  IN:    IV PiggyBack: 300 mL    Lactated Ringers Bolus: 1000 mL    Oral Fluid: 300 mL    Sodium Chloride 0.9% Bolus: 1000 mL  Total IN: 2600 mL    OUT:    Voided (mL): 350 mL  Total OUT: 350 mL    Total NET: 2250 mL            LABS:                        11.8   9.78  )-----------( 219      ( 28 Nov 2022 13:34 )             33.8     11-28    138  |  110<H>  |  17  ----------------------------<  197<H>  3.6   |  21<L>  |  0.91    Ca    8.2<L>      28 Nov 2022 13:34  Phos  2.4     11-28  Mg     2.0     11-28    TPro  6.1  /  Alb  2.4<L>  /  TBili  0.5  /  DBili  x   /  AST  22  /  ALT  34  /  AlkPhos  146<H>  11-28          CAPILLARY BLOOD GLUCOSE      POCT Blood Glucose.: 255 mg/dL (28 Nov 2022 18:33)        CULTURES:  Culture Results:   No growth to date. (11-26 @ 17:01)  Culture Results:   No growth to date. (11-26 @ 17:01)  Culture Results:   10,000 - 49,000 CFU/mL Enterococcus faecalis  10,000 - 49,000 CFU/mL Pseudomonas aeruginosa (11-26 @ 12:40)      Physical Examination:    General: No acute distress.      HEENT: Pupils equal, reactive to light.  Symmetric.    PULM: Clear to auscultation bilaterally, no significant sputum production    NECK: Supple, no lymphadenopathy, trachea midline    CVS: Regular rate and rhythm, no murmurs, rubs, or gallops    ABD: Soft, nondistended, nontender, normoactive bowel sounds, no masses    EXT: No edema, nontender    SKIN: Warm and well perfused, no rashes noted.    NEURO: agitated delirium    RADIOLOGY:    IMPRESSION:  NO EVIDENCE OF ACUTE INTRACRANIAL HEMORRHAGE OR HYDROCEPHALUS.  ATROPHY   WITH WHITE MATTER ISCHEMIC CHANGES.    --- End of Report ---            RIP ORNELAS MD; Attending Radiologist  This document has been electronically signed. Nov 26 2022  2:20PM    
Date of service: 11-29-22 @ 12:01    Lying in bed in NAD  Alert and confused  No fever  No SOB  Hemodynamically improved    ROS: no fever or chills; poorly verbal    MEDICATIONS  (STANDING):  cefepime   IVPB 2000 milliGRAM(s) IV Intermittent every 12 hours  chlorhexidine 4% Liquid 1 Application(s) Topical <User Schedule>  dextrose 5%. 1000 milliLiter(s) (50 mL/Hr) IV Continuous <Continuous>  dextrose 5%. 1000 milliLiter(s) (100 mL/Hr) IV Continuous <Continuous>  dextrose 50% Injectable 25 Gram(s) IV Push once  dextrose 50% Injectable 12.5 Gram(s) IV Push once  dextrose 50% Injectable 25 Gram(s) IV Push once  glucagon  Injectable 1 milliGRAM(s) IntraMuscular once  insulin lispro (ADMELOG) corrective regimen sliding scale   SubCutaneous three times a day before meals  insulin lispro (ADMELOG) corrective regimen sliding scale   SubCutaneous at bedtime  OLANZapine Injectable 2.5 milliGRAM(s) IntraMuscular once  QUEtiapine 25 milliGRAM(s) Oral two times a day  rivaroxaban 15 milliGRAM(s) Oral two times a day  vancomycin  IVPB 1000 milliGRAM(s) IV Intermittent every 24 hours    Vital Signs Last 24 Hrs  T(C): 36.1 (29 Nov 2022 00:51), Max: 36.2 (28 Nov 2022 17:38)  T(F): 97 (29 Nov 2022 00:51), Max: 97.2 (28 Nov 2022 17:38)  HR: 69 (29 Nov 2022 11:00) (52 - 104)  BP: 139/61 (29 Nov 2022 11:00) (79/45 - 176/103)  BP(mean): 82 (29 Nov 2022 11:00) (53 - 118)  RR: 17 (29 Nov 2022 11:00) (13 - 23)  SpO2: 99% (29 Nov 2022 09:00) (95% - 100%)    Parameters below as of 28 Nov 2022 16:00  Patient On (Oxygen Delivery Method): room air     Physical exam:    Constitutional:  No acute distress  HEENT: NC/AT, EOMI, PERRLA, conjunctivae clear; ears and nose atraumatic  Neck: supple; thyroid not palpable  Back: no tenderness  Respiratory: respiratory effort normal; few crackles at bases  Cardiovascular: S1S2 regular, no murmurs  Abdomen: soft, not tender, not distended, positive BS  Genitourinary: no suprapubic tenderness  Lymphatic: no LN palpable  Musculoskeletal: no muscle tenderness, no joint swelling or tenderness  Extremities: no pedal edema  Neurological/ Psychiatric: alert, judgement and insight impaired; moving all extremities  Skin: no rashes; no palpable lesions    Labs: reviewed                        12.3   10.09 )-----------( 196      ( 29 Nov 2022 06:31 )             37.3     11-29    142  |  112<H>  |  18  ----------------------------<  176<H>  3.8   |  23  |  0.90    Ca    8.6      29 Nov 2022 06:31  Phos  2.8     11-29  Mg     2.2     11-29    TPro  6.1  /  Alb  2.4<L>  /  TBili  0.5  /  DBili  x   /  AST  22  /  ALT  34  /  AlkPhos  146<H>  11-28    D-Dimer Assay, Quantitative: 2099 ng/mL DDU (11-24-22 @ 08:36)  D-Dimer Assay, Quantitative: 2673 ng/mL DDU (11-23-22 @ 10:44)  Ferritin, Serum: 226 ng/mL (11-23-22 @ 10:44)  C-Reactive Protein, Serum: 22 mg/L (11-23-22 @ 10:44)  D-Dimer Assay, Quantitative: 2584 ng/mL DDU (11-22-22 @ 06:35)  D-Dimer Assay, Quantitative: 2392 ng/mL DDU (11-20-22 @ 17:21)  C-Reactive Protein, Serum: 81 mg/L (11-18-22 @ 06:58)  Ferritin, Serum: 233 ng/mL (11-18-22 @ 06:58)  D-Dimer Assay, Quantitative: 1157 ng/mL DDU (11-17-22 @ 17:14)                        11.8   9.78  )-----------( 219      ( 28 Nov 2022 13:34 )             33.8     11-28    138  |  110<H>  |  17  ----------------------------<  197<H>  3.6   |  21<L>  |  0.91    Ca    8.2<L>      28 Nov 2022 13:34  Phos  2.4     11-28  Mg     2.0     11-28    TPro  6.1  /  Alb  2.4<L>  /  TBili  0.5  /  DBili  x   /  AST  22  /  ALT  34  /  AlkPhos  146<H>  11-28     LIVER FUNCTIONS - ( 28 Nov 2022 13:34 )  Alb: 2.4 g/dL / Pro: 6.1 gm/dL / ALK PHOS: 146 U/L / ALT: 34 U/L / AST: 22 U/L / GGT: x             Culture - Blood (collected 26 Nov 2022 17:01)  Source: .Blood None  Preliminary Report (28 Nov 2022 02:03):    No growth to date.    Culture - Blood (collected 26 Nov 2022 17:01)  Source: .Blood None  Preliminary Report (28 Nov 2022 02:03):    No growth to date.    Culture - Urine (collected 26 Nov 2022 12:40)  Source: Clean Catch None  Final Report (28 Nov 2022 19:11):    10,000 - 49,000 CFU/mL Enterococcus faecalis    10,000 - 49,000 CFU/mL Pseudomonas aeruginosa  Organism: Enterococcus faecalis  Pseudomonas aeruginosa (28 Nov 2022 19:11)  Organism: Pseudomonas aeruginosa (28 Nov 2022 19:11)      -  Amikacin: S <=16      -  Aztreonam: S <=4      -  Cefepime: S <=2      -  Ceftazidime: S 4      -  Ciprofloxacin: I 1      -  Gentamicin: S 4      -  Imipenem: S 2      -  Levofloxacin: S <=0.5      -  Meropenem: S <=1      -  Piperacillin/Tazobactam: S <=8      -  Tobramycin: S <=2      Method Type: MIGUEL  Organism: Enterococcus faecalis (28 Nov 2022 19:11)      -  Ampicillin: S <=2 Predicts results to ampicillin/sulbactam, amoxacillin-clavulanate and  piperacillin-tazobactam.      -  Ciprofloxacin: S <=1      -  Levofloxacin: S <=1      -  Nitrofurantoin: S <=32 Should not be used to treat pyelonephritis.      -  Tetracycline: R >8      -  Vancomycin: S 2      Method Type: MIGUEL    Radiology: all available radiological tests reviewed    < from: Xray Chest 1 View- PORTABLE-Routine (Xray Chest 1 View- PORTABLE-Routine in AM.) (11.24.22 @ 08:46) >  Progression then improvement of patchy infiltrates.    < end of copied text >      Advanced directives addressed: full resuscitation
Date of service: 11-30-22 @ 11:24    Alert and confused  Dose not seem in pain  No fever  Has dry cough    ROS: no fever or chills; limited    MEDICATIONS  (STANDING):  cefepime   IVPB 2000 milliGRAM(s) IV Intermittent every 12 hours  chlorhexidine 4% Liquid 1 Application(s) Topical <User Schedule>  dextrose 5%. 1000 milliLiter(s) (100 mL/Hr) IV Continuous <Continuous>  dextrose 5%. 1000 milliLiter(s) (50 mL/Hr) IV Continuous <Continuous>  dextrose 50% Injectable 25 Gram(s) IV Push once  dextrose 50% Injectable 12.5 Gram(s) IV Push once  dextrose 50% Injectable 25 Gram(s) IV Push once  glucagon  Injectable 1 milliGRAM(s) IntraMuscular once  insulin lispro (ADMELOG) corrective regimen sliding scale   SubCutaneous three times a day before meals  insulin lispro (ADMELOG) corrective regimen sliding scale   SubCutaneous at bedtime  OLANZapine Injectable 2.5 milliGRAM(s) IntraMuscular once  predniSONE   Tablet 30 milliGRAM(s) Oral daily  QUEtiapine 25 milliGRAM(s) Oral two times a day  rivaroxaban 15 milliGRAM(s) Oral two times a day  vancomycin  IVPB 1000 milliGRAM(s) IV Intermittent every 24 hours    Vital Signs Last 24 Hrs  T(C): 36.2 (30 Nov 2022 08:31), Max: 36.2 (29 Nov 2022 21:10)  T(F): 97.1 (30 Nov 2022 08:31), Max: 97.2 (29 Nov 2022 21:10)  HR: 78 (30 Nov 2022 02:00) (58 - 81)  BP: 135/75 (29 Nov 2022 21:10) (100/54 - 135/75)  BP(mean): 90 (29 Nov 2022 21:10) (64 - 95)  RR: 18 (29 Nov 2022 21:10) (14 - 19)  SpO2: 94% (30 Nov 2022 02:00) (94% - 99%)    Parameters below as of 29 Nov 2022 21:10  Patient On (Oxygen Delivery Method): room air     Physical exam:    Constitutional:  No acute distress  HEENT: NC/AT, EOMI, PERRLA, conjunctivae clear; ears and nose atraumatic  Neck: supple; thyroid not palpable  Back: no tenderness  Respiratory: respiratory effort normal; few crackles at bases  Cardiovascular: S1S2 regular, no murmurs  Abdomen: soft, not tender, not distended, positive BS  Genitourinary: no suprapubic tenderness  Lymphatic: no LN palpable  Musculoskeletal: no muscle tenderness, no joint swelling or tenderness  Extremities: no pedal edema  Neurological/ Psychiatric: alert, judgement and insight impaired; moving all extremities  Skin: no rashes; no palpable lesions    Labs: reviewed                        13.7   9.31  )-----------( 198      ( 30 Nov 2022 06:33 )             40.5     11-30    139  |  108  |  18  ----------------------------<  178<H>  3.6   |  24  |  0.79    Ca    8.3<L>      30 Nov 2022 06:33  Phos  2.8     11-30  Mg     2.2     11-30    TPro  6.1  /  Alb  2.4<L>  /  TBili  0.5  /  DBili  x   /  AST  22  /  ALT  34  /  AlkPhos  146<H>  11-28    D-Dimer Assay, Quantitative: 2099 ng/mL DDU (11-24-22 @ 08:36)  D-Dimer Assay, Quantitative: 2673 ng/mL DDU (11-23-22 @ 10:44)  Ferritin, Serum: 226 ng/mL (11-23-22 @ 10:44)  C-Reactive Protein, Serum: 22 mg/L (11-23-22 @ 10:44)  D-Dimer Assay, Quantitative: 2584 ng/mL DDU (11-22-22 @ 06:35)  D-Dimer Assay, Quantitative: 2392 ng/mL DDU (11-20-22 @ 17:21)                        11.8   9.78  )-----------( 219      ( 28 Nov 2022 13:34 )             33.8     11-28    138  |  110<H>  |  17  ----------------------------<  197<H>  3.6   |  21<L>  |  0.91    Ca    8.2<L>      28 Nov 2022 13:34  Phos  2.4     11-28  Mg     2.0     11-28    TPro  6.1  /  Alb  2.4<L>  /  TBili  0.5  /  DBili  x   /  AST  22  /  ALT  34  /  AlkPhos  146<H>  11-28     LIVER FUNCTIONS - ( 28 Nov 2022 13:34 )  Alb: 2.4 g/dL / Pro: 6.1 gm/dL / ALK PHOS: 146 U/L / ALT: 34 U/L / AST: 22 U/L / GGT: x             Culture - Blood (collected 26 Nov 2022 17:01)  Source: .Blood None  Preliminary Report (28 Nov 2022 02:03):    No growth to date.    Culture - Blood (collected 26 Nov 2022 17:01)  Source: .Blood None  Preliminary Report (28 Nov 2022 02:03):    No growth to date.    Culture - Urine (collected 26 Nov 2022 12:40)  Source: Clean Catch None  Final Report (28 Nov 2022 19:11):    10,000 - 49,000 CFU/mL Enterococcus faecalis    10,000 - 49,000 CFU/mL Pseudomonas aeruginosa  Organism: Enterococcus faecalis  Pseudomonas aeruginosa (28 Nov 2022 19:11)  Organism: Pseudomonas aeruginosa (28 Nov 2022 19:11)      -  Amikacin: S <=16      -  Aztreonam: S <=4      -  Cefepime: S <=2      -  Ceftazidime: S 4      -  Ciprofloxacin: I 1      -  Gentamicin: S 4      -  Imipenem: S 2      -  Levofloxacin: S <=0.5      -  Meropenem: S <=1      -  Piperacillin/Tazobactam: S <=8      -  Tobramycin: S <=2      Method Type: MIGUEL  Organism: Enterococcus faecalis (28 Nov 2022 19:11)      -  Ampicillin: S <=2 Predicts results to ampicillin/sulbactam, amoxacillin-clavulanate and  piperacillin-tazobactam.      -  Ciprofloxacin: S <=1      -  Levofloxacin: S <=1      -  Nitrofurantoin: S <=32 Should not be used to treat pyelonephritis.      -  Tetracycline: R >8      -  Vancomycin: S 2      Method Type: MIGUEL    Radiology: all available radiological tests reviewed    < from: Xray Chest 1 View- PORTABLE-Routine (Xray Chest 1 View- PORTABLE-Routine in AM.) (11.24.22 @ 08:46) >  Progression then improvement of patchy infiltrates.    < end of copied text >      Advanced directives addressed: full resuscitation
81 y/o F recent admission to  11/11 - 11/24/2022 for management of acute infectious encephalopathy with hypoxic respiratory failure secondary to COVID19 complicated by multifocal PNA, E. coli UTI was BIBA from home for further evaluation and management of multiple lacerations to left forearm with a nail clipper. As per patient's daughter she was attempting to administer the patient's home medications as prescribed when the patient became upset, frustrated, and started cutting her left arm from her left wrist to her left elbow. In the ED the patient was evaluated by Psychiatry. As noted patient has no formal PPHx, and no history of SA/SIB. Vital signs in triage => /76, HR 71/min, RR 16/min, TMax 98.4'F, and SpO2 96% on room air. Labs => Na 134, BUN//Cr 24/1.23, Glu 329, Alb 2.9, HgbA1C 7.9, (+)UA. CT Head => No evidence of ICH or hydrocephalus. Atrophy with white matter ischemic changes. In the ED the patient was given Cephalexin 500mg PO x 1, Ceftriaxone 1g IVPB x 1, Quetiapine 25mg PO x 1, Haloperidol 1mg PO x 1, and NS x 2L." Patient found to have positive UTI with pseudomonas and enterococcus, being treated with CTX.  11/29 clinically stable, no acute events, Case discussed on CCU Rounds.    ICU Vital Signs Last 24 Hrs  T(C): 36.1 (29 Nov 2022 00:51), Max: 36.1 (28 Nov 2022 21:55)  T(F): 97 (29 Nov 2022 00:51), Max: 97 (28 Nov 2022 21:55)  HR: 81 (29 Nov 2022 15:00) (52 - 92)  BP: 100/54 (29 Nov 2022 15:00) (79/45 - 176/103)  BP(mean): 64 (29 Nov 2022 15:00) (53 - 118)  RR: 17 (29 Nov 2022 15:00) (13 - 23)  SpO2: 99% (29 Nov 2022 09:00) (95% - 100%)                                  12.3   10.09 )-----------( 196      ( 29 Nov 2022 06:31 )             37.3         11-29    142  |  112<H>  |  18  ----------------------------<  176<H>  3.8   |  23  |  0.90    Ca    8.6      29 Nov 2022 06:31  Phos  2.8     11-29  Mg     2.2     11-29    TPro  6.1  /  Alb  2.4<L>  /  TBili  0.5  /  DBili  x   /  AST  22  /  ALT  34  /  AlkPhos  146<H>  11-28    LIVER FUNCTIONS - ( 28 Nov 2022 13:34 )  Alb: 2.4 g/dL / Pro: 6.1 gm/dL / ALK PHOS: 146 U/L / ALT: 34 U/L / AST: 22 U/L / GGT: x             
 83 y/o F recent admission to  11/11 - 11/24/2022 for management of acute infectious encephalopathy with hypoxic respiratory failure secondary to COVID19 complicated by multifocal PNA, E. coli UTI was BIBA from home for further evaluation and management of multiple lacerations to left forearm with a nail clipper. As per patient's daughter she was attempting to administer the patient's home medications as prescribed when the patient became upset, frustrated, and started cutting her left arm from her left wrist to her left elbow. In the ED the patient was evaluated by Psychiatry. As noted patient has no formal PPHx, and no history of SA/SIB. Additionally, on the patient's prior admission she was noted to have episodes of agitation (requiring prns of Haloperidol and Quetiapine). In the ED the patient was confused awake and alert oriented to person, and place. The patient's daughter reports that the patient has ongoing decline in memory for the last few years and states at times patient leaves the house to go on walks but hasn't gotten lost. The patient's daughter states that since last night and into this morning the patient has been increasingly more agitated, and verbally abusive to her family.    11/28: Called to a Rapid response for hypotension and syncope.  Patient transported to the CCU for hypotension.  11/30 Clinically stable, Case discussed on CCU rounds and signed out to the floor for transfer to the Hospitalist Service.  Dr Jackson  12.1: no distress, confused  12.2: agitated, confused, would not eat and would not take meds   12.3: somnolent, arousable, received Zyprexa yesterday  12.4: alert, confused , got agitated in the afternoon   12.5: pleasant on my exam, smiling, reading a book ; confused at times with bizarre behaviour , por appetite     REVIEW OF SYSTEMS:  unable to obtain     Vital Signs Last 24 Hrs  T(C): 36.7 (02 Dec 2022 07:31), Max: 36.7 (02 Dec 2022 07:31)  T(F): 98.1 (02 Dec 2022 07:31), Max: 98.1 (02 Dec 2022 07:31)  HR: 55 (02 Dec 2022 07:31) (55 - 76)  BP: 134/69 (02 Dec 2022 07:31) (112/96 - 134/69)  BP(mean): 85 (02 Dec 2022 00:19) (85 - 100)  RR: 16 (02 Dec 2022 07:31) (16 - 18)  SpO2: 99% (02 Dec 2022 07:31) (97% - 99%)    Parameters below as of 02 Dec 2022 07:31  Patient On (Oxygen Delivery Method): room air            PHYSICAL EXAM:    GENERAL: NAD  HEENT:  NC/AT, EOMI, PERRLA, No scleral icterus, Moist mucous membranes  NECK: Supple, No JVD  CNS:  Alert & Oriented X1. Motor Strength 5/5 B/L upper and lower extremities; DTRs 2+ intact   LUNG: Normal Breath sounds, Clear to auscultation bilaterally, No rales, No rhonchi, No wheezing  HEART: RRR; No murmurs, No rubs  ABDOMEN: +BS, ST/ND/NT  GENITOURINARY: Voiding, Bladder not distended  EXTREMITIES:  2+ Peripheral Pulses, No clubbing, No cyanosis, No tibial edema, L forearm healing cuts   MUSCULOSKELTAL: Joints normal ROM, No TTP, No effusion  VAGINAL: deferred  SKIN: no rashes  RECTAL: deferred, not indicated  BREAST: deferred                          13.7   9.31  )-----------( 198      ( 30 Nov 2022 06:33 )             40.5     11-30    139  |  108  |  18  ----------------------------<  178<H>  3.6   |  24  |  0.79    Ca    8.3<L>      30 Nov 2022 06:33  Phos  2.8     11-30  Mg     2.2     11-30      Vancomycin levels: Vancomycin Level, Trough: 6.7 ug/mL (11-30 @ 11:41)    Cultures:     MEDICATIONS  (STANDING):  chlorhexidine 4% Liquid 1 Application(s) Topical <User Schedule>  glucagon  Injectable 1 milliGRAM(s) IntraMuscular once  insulin lispro (ADMELOG) corrective regimen sliding scale   SubCutaneous three times a day before meals  insulin lispro (ADMELOG) corrective regimen sliding scale   SubCutaneous at bedtime  predniSONE   Tablet 20 milliGRAM(s) Oral daily  QUEtiapine 25 milliGRAM(s) Oral two times a day  rivaroxaban 15 milliGRAM(s) Oral two times a day        all labs reviewed  all imaging reviewed    a/p:    1. Sepsis due to Uti: poa    2. Pseudomonas Uti:  s/p Cefepime IV day#7/7    3. DM type II    4. Dementia with psychotic features   Psych evaluation noted  Seroquel 25mg Bid    will stop zyprexa due to excessive somnolence   Psych re evaluation   needs CO    5. 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      
 81 y/o F recent admission to  11/11 - 11/24/2022 for management of acute infectious encephalopathy with hypoxic respiratory failure secondary to COVID19 complicated by multifocal PNA, E. coli UTI was BIBA from home for further evaluation and management of multiple lacerations to left forearm with a nail clipper. As per patient's daughter she was attempting to administer the patient's home medications as prescribed when the patient became upset, frustrated, and started cutting her left arm from her left wrist to her left elbow. In the ED the patient was evaluated by Psychiatry. As noted patient has no formal PPHx, and no history of SA/SIB. Additionally, on the patient's prior admission she was noted to have episodes of agitation (requiring prns of Haloperidol and Quetiapine). In the ED the patient was confused awake and alert oriented to person, and place. The patient's daughter reports that the patient has ongoing decline in memory for the last few years and states at times patient leaves the house to go on walks but hasn't gotten lost. The patient's daughter states that since last night and into this morning the patient has been increasingly more agitated, and verbally abusive to her family.  11/28: Called to a Rapid response for hypotension and syncope.  Patient transported to the CCU for hypotension.  11/30 Clinically stable, Case discussed on CCU rounds and signed out to the floor for transfer to the Hospitalist Service.  Dr Jackson  12.1: no distress, confused      REVIEW OF SYSTEMS:  unable to obtain     Vital Signs Last 24 Hrs  T(C): 36.3 (01 Dec 2022 17:08), Max: 36.3 (01 Dec 2022 17:08)  T(F): 97.4 (01 Dec 2022 17:08), Max: 97.4 (01 Dec 2022 17:08)  HR: 55 (01 Dec 2022 08:00) (55 - 82)  BP: 102/48 (01 Dec 2022 08:00) (102/48 - 133/75)  BP(mean): 62 (01 Dec 2022 08:00) (62 - 89)  RR: 16 (01 Dec 2022 08:00) (16 - 22)  SpO2: 95% (01 Dec 2022 08:00) (95% - 96%)    Parameters below as of 01 Dec 2022 08:00  Patient On (Oxygen Delivery Method): room air        PHYSICAL EXAM:    GENERAL: NAD  HEENT:  NC/AT, EOMI, PERRLA, No scleral icterus, Moist mucous membranes  NECK: Supple, No JVD  CNS:  Alert & Oriented X1. Motor Strength 5/5 B/L upper and lower extremities; DTRs 2+ intact   LUNG: Normal Breath sounds, Clear to auscultation bilaterally, No rales, No rhonchi, No wheezing  HEART: RRR; No murmurs, No rubs  ABDOMEN: +BS, ST/ND/NT  GENITOURINARY: Voiding, Bladder not distended  EXTREMITIES:  2+ Peripheral Pulses, No clubbing, No cyanosis, No tibial edema, L forearm healing cuts   MUSCULOSKELTAL: Joints normal ROM, No TTP, No effusion  VAGINAL: deferred  SKIN: no rashes  RECTAL: deferred, not indicated  BREAST: deferred                          13.7   9.31  )-----------( 198      ( 30 Nov 2022 06:33 )             40.5     11-30    139  |  108  |  18  ----------------------------<  178<H>  3.6   |  24  |  0.79    Ca    8.3<L>      30 Nov 2022 06:33  Phos  2.8     11-30  Mg     2.2     11-30      Vancomycin levels: Vancomycin Level, Trough: 6.7 ug/mL (11-30 @ 11:41)    Cultures:     MEDICATIONS  (STANDING):  cefepime   IVPB 2000 milliGRAM(s) IV Intermittent every 12 hours  chlorhexidine 4% Liquid 1 Application(s) Topical <User Schedule>  glucagon  Injectable 1 milliGRAM(s) IntraMuscular once  insulin lispro (ADMELOG) corrective regimen sliding scale   SubCutaneous three times a day before meals  insulin lispro (ADMELOG) corrective regimen sliding scale   SubCutaneous at bedtime  predniSONE   Tablet 30 milliGRAM(s) Oral daily  QUEtiapine 25 milliGRAM(s) Oral two times a day  rivaroxaban 15 milliGRAM(s) Oral two times a day    MEDICATIONS  (PRN):  albuterol    90 MICROgram(s) HFA Inhaler 2 Puff(s) Inhalation every 6 hours PRN Shortness of Breath and/or Wheezing  dextrose Oral Gel 15 Gram(s) Oral once PRN Blood Glucose LESS THAN 70 milliGRAM(s)/deciliter  OLANZapine Injectable 2.5 milliGRAM(s) IntraMuscular every 6 hours PRN SEVERE PSYCHIOTIC AGITATION  QUEtiapine 25 milliGRAM(s) Oral every 6 hours PRN moderate psychotic  agitation      all labs reviewed  all imaging reviewed    a/p:    1. SEpsis due to Uti: poa    2. Pseudomonas Uti:  Cefepime IV day#4    3. DM type II    4. Dementia with psychotic features      
 81 y/o F recent admission to  11/11 - 11/24/2022 for management of acute infectious encephalopathy with hypoxic respiratory failure secondary to COVID19 complicated by multifocal PNA, E. coli UTI was BIBA from home for further evaluation and management of multiple lacerations to left forearm with a nail clipper. As per patient's daughter she was attempting to administer the patient's home medications as prescribed when the patient became upset, frustrated, and started cutting her left arm from her left wrist to her left elbow. In the ED the patient was evaluated by Psychiatry. As noted patient has no formal PPHx, and no history of SA/SIB. Additionally, on the patient's prior admission she was noted to have episodes of agitation (requiring prns of Haloperidol and Quetiapine). In the ED the patient was confused awake and alert oriented to person, and place. The patient's daughter reports that the patient has ongoing decline in memory for the last few years and states at times patient leaves the house to go on walks but hasn't gotten lost. The patient's daughter states that since last night and into this morning the patient has been increasingly more agitated, and verbally abusive to her family.  11/28: Called to a Rapid response for hypotension and syncope.  Patient transported to the CCU for hypotension.  11/30 Clinically stable, Case discussed on CCU rounds and signed out to the floor for transfer to the Hospitalist Service.  Dr Jackson  12.1: no distress, confused  12.2: agitated, confused, would not eat and would not take meds       REVIEW OF SYSTEMS:  unable to obtain     Vital Signs Last 24 Hrs  T(C): 36.7 (02 Dec 2022 07:31), Max: 36.7 (02 Dec 2022 07:31)  T(F): 98.1 (02 Dec 2022 07:31), Max: 98.1 (02 Dec 2022 07:31)  HR: 55 (02 Dec 2022 07:31) (55 - 76)  BP: 134/69 (02 Dec 2022 07:31) (112/96 - 134/69)  BP(mean): 85 (02 Dec 2022 00:19) (85 - 100)  RR: 16 (02 Dec 2022 07:31) (16 - 18)  SpO2: 99% (02 Dec 2022 07:31) (97% - 99%)    Parameters below as of 02 Dec 2022 07:31  Patient On (Oxygen Delivery Method): room air            PHYSICAL EXAM:    GENERAL: NAD  HEENT:  NC/AT, EOMI, PERRLA, No scleral icterus, Moist mucous membranes  NECK: Supple, No JVD  CNS:  Alert & Oriented X1. Motor Strength 5/5 B/L upper and lower extremities; DTRs 2+ intact   LUNG: Normal Breath sounds, Clear to auscultation bilaterally, No rales, No rhonchi, No wheezing  HEART: RRR; No murmurs, No rubs  ABDOMEN: +BS, ST/ND/NT  GENITOURINARY: Voiding, Bladder not distended  EXTREMITIES:  2+ Peripheral Pulses, No clubbing, No cyanosis, No tibial edema, L forearm healing cuts   MUSCULOSKELTAL: Joints normal ROM, No TTP, No effusion  VAGINAL: deferred  SKIN: no rashes  RECTAL: deferred, not indicated  BREAST: deferred                          13.7   9.31  )-----------( 198      ( 30 Nov 2022 06:33 )             40.5     11-30    139  |  108  |  18  ----------------------------<  178<H>  3.6   |  24  |  0.79    Ca    8.3<L>      30 Nov 2022 06:33  Phos  2.8     11-30  Mg     2.2     11-30      Vancomycin levels: Vancomycin Level, Trough: 6.7 ug/mL (11-30 @ 11:41)    Cultures:     MEDICATIONS  (STANDING):  chlorhexidine 4% Liquid 1 Application(s) Topical <User Schedule>  glucagon  Injectable 1 milliGRAM(s) IntraMuscular once  insulin lispro (ADMELOG) corrective regimen sliding scale   SubCutaneous three times a day before meals  insulin lispro (ADMELOG) corrective regimen sliding scale   SubCutaneous at bedtime  predniSONE   Tablet 20 milliGRAM(s) Oral daily  QUEtiapine 25 milliGRAM(s) Oral two times a day  rivaroxaban 15 milliGRAM(s) Oral two times a day        all labs reviewed  all imaging reviewed    a/p:    1. Sepsis due to Uti: poa    2. Pseudomonas Uti:  Cefepime IV day#5    3. DM type II    4. Dementia with psychotic features   Psych evaluation noted  Seroquel 25mg Bid  Zyprexa prn     5. 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      
 83 y/o F recent admission to  11/11 - 11/24/2022 for management of acute infectious encephalopathy with hypoxic respiratory failure secondary to COVID19 complicated by multifocal PNA, E. coli UTI was BIBA from home for further evaluation and management of multiple lacerations to left forearm with a nail clipper. As per patient's daughter she was attempting to administer the patient's home medications as prescribed when the patient became upset, frustrated, and started cutting her left arm from her left wrist to her left elbow. In the ED the patient was evaluated by Psychiatry. As noted patient has no formal PPHx, and no history of SA/SIB. Additionally, on the patient's prior admission she was noted to have episodes of agitation (requiring prns of Haloperidol and Quetiapine). In the ED the patient was confused awake and alert oriented to person, and place. The patient's daughter reports that the patient has ongoing decline in memory for the last few years and states at times patient leaves the house to go on walks but hasn't gotten lost. The patient's daughter states that since last night and into this morning the patient has been increasingly more agitated, and verbally abusive to her family.  11/28: Called to a Rapid response for hypotension and syncope.  Patient transported to the CCU for hypotension.  11/30 Clinically stable, Case discussed on CCU rounds and signed out to the floor for transfer to the Hospitalist Service.  Dr Jackson      ICU Vital Signs Last 24 Hrs  T(C): 36.2 (30 Nov 2022 08:31), Max: 36.2 (29 Nov 2022 21:10)  T(F): 97.1 (30 Nov 2022 08:31), Max: 97.2 (29 Nov 2022 21:10)  HR: 78 (30 Nov 2022 02:00) (58 - 81)  BP: 135/75 (29 Nov 2022 21:10) (100/54 - 139/61)  BP(mean): 90 (29 Nov 2022 21:10) (64 - 95)  RR: 18 (29 Nov 2022 21:10) (14 - 19)  SpO2: 94% (30 Nov 2022 02:00) (94% - 99%)    O2 Parameters below as of 29 Nov 2022 21:10  Patient On (Oxygen Delivery Method): room air                            13.7   9.31  )-----------( 198      ( 30 Nov 2022 06:33 )             40.5         11-30    139  |  108  |  18  ----------------------------<  178<H>  3.6   |  24  |  0.79    Ca    8.3<L>      30 Nov 2022 06:33  Phos  2.8     11-30  Mg     2.2     11-30    TPro  6.1  /  Alb  2.4<L>  /  TBili  0.5  /  DBili  x   /  AST  22  /  ALT  34  /  AlkPhos  146<H>  11-28    LIVER FUNCTIONS - ( 28 Nov 2022 13:34 )  Alb: 2.4 g/dL / Pro: 6.1 gm/dL / ALK PHOS: 146 U/L / ALT: 34 U/L / AST: 22 U/L / GGT: x                    
HPI:  83 y/o F recent admission to  11/11 - 11/24/2022 for management of acute infectious encephalopathy with hypoxic respiratory failure secondary to COVID19 complicated by multifocal PNA, E. coli UTI was BIBA from home for further evaluation and management of multiple lacerations to left forearm with a nail clipper. As per patient's daughter she was attempting to administer the patient's home medications as prescribed when the patient became upset, frustrated, and started cutting her left arm from her left wrist to her left elbow. In the ED the patient was evaluated by Psychiatry. As noted patient has no formal PPHx, and no history of SA/SIB. Additionally, on the patient's prior admission she was noted to have episodes of agitation (requiring prns of Haloperidol and Quetiapine). In the ED the patient was confused awake and alert oriented to person, and place. The patient's daughter reports that the patient has ongoing decline in memory for the last few years and states at times patient leaves the house to go on walks but hasn't gotten lost. The patient's daughter states that since last night and into this morning the patient has been increasingly more agitated, and verbally abusive to her family.      11/28: Called to a Rapid response for hypotension and syncope.  Patient transported to the CCU for hypotension       PAST MEDICAL & SURGICAL HISTORY:  2019 novel coronavirus disease (COVID-19)      E. coli UTI (urinary tract infection)      Moderate aortic stenosis      No significant past surgical history          FAMILY HISTORY:  No pertinent family history in first degree relatives        Social Hx:    Allergies    No Known Allergies    Intolerances            ICU Vital Signs Last 24 Hrs  T(C): 35.9 (28 Nov 2022 13:00), Max: 37.3 (28 Nov 2022 06:06)  T(F): 96.6 (28 Nov 2022 13:00), Max: 99.1 (28 Nov 2022 06:06)  HR: 89 (28 Nov 2022 15:03) (66 - 89)  BP: 117/62 (28 Nov 2022 15:03) (112/55 - 161/79)  BP(mean): 73 (28 Nov 2022 15:03) (69 - 73)  ABP: --  ABP(mean): --  RR: 16 (28 Nov 2022 15:03) (16 - 21)  SpO2: 96% (28 Nov 2022 14:00) (93% - 100%)    O2 Parameters below as of 28 Nov 2022 13:00  Patient On (Oxygen Delivery Method): nasal cannula  O2 Flow (L/min): 2              I&O's Summary                            11.8   9.78  )-----------( 219      ( 28 Nov 2022 13:34 )             33.8       11-28    138  |  110<H>  |  17  ----------------------------<  197<H>  3.6   |  21<L>  |  0.91    Ca    8.2<L>      28 Nov 2022 13:34  Phos  2.4     11-28  Mg     2.0     11-28    TPro  6.1  /  Alb  2.4<L>  /  TBili  0.5  /  DBili  x   /  AST  22  /  ALT  34  /  AlkPhos  146<H>  11-28                    MEDICATIONS  (STANDING):  cefepime   IVPB 2000 milliGRAM(s) IV Intermittent every 12 hours  chlorhexidine 4% Liquid 1 Application(s) Topical <User Schedule>  dextrose 5%. 1000 milliLiter(s) (50 mL/Hr) IV Continuous <Continuous>  dextrose 5%. 1000 milliLiter(s) (100 mL/Hr) IV Continuous <Continuous>  dextrose 50% Injectable 25 Gram(s) IV Push once  dextrose 50% Injectable 12.5 Gram(s) IV Push once  dextrose 50% Injectable 25 Gram(s) IV Push once  glucagon  Injectable 1 milliGRAM(s) IntraMuscular once  insulin lispro (ADMELOG) corrective regimen sliding scale   SubCutaneous three times a day before meals  insulin lispro (ADMELOG) corrective regimen sliding scale   SubCutaneous at bedtime  predniSONE   Tablet 40 milliGRAM(s) Oral daily  QUEtiapine 25 milliGRAM(s) Oral two times a day  rivaroxaban 15 milliGRAM(s) Oral two times a day  vancomycin  IVPB 1000 milliGRAM(s) IV Intermittent every 24 hours    MEDICATIONS  (PRN):  albuterol    90 MICROgram(s) HFA Inhaler 2 Puff(s) Inhalation every 6 hours PRN Shortness of Breath and/or Wheezing  dextrose Oral Gel 15 Gram(s) Oral once PRN Blood Glucose LESS THAN 70 milliGRAM(s)/deciliter  haloperidol     Tablet 1 milliGRAM(s) Oral every 8 hours PRN agitation  haloperidol    Injectable 1 milliGRAM(s) IntraMuscular every 6 hours PRN severe psychoitic agitation      DVT Prophylaxis:    Advanced Directives: DOAC  Discussed with:    Visit Information: 30 min    ** Time is exclusive of billed procedures and/or teaching and/or routine family updates.

## 2022-12-12 NOTE — PROGRESS NOTE ADULT - ASSESSMENT
81 y/o female with pmhx of moderate AS, recent covid-19 and ecoli uti BIBA due to multiple lacerateions on arm from nail clipper admitted for further psych eval. Periods of agitation at home and in hospital. RRT on 11/28 for syncope and hypotension. Transferred to CCU, improved s/p fluid bolus.    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  awaiting daughters decision   medically stable for discharge   patient's insurance required psych reeval 12/9- psych once again cleared pt for discharge - does not need in-patient psych admission

## 2022-12-16 DIAGNOSIS — N39.0 URINARY TRACT INFECTION, SITE NOT SPECIFIED: ICD-10-CM

## 2022-12-16 DIAGNOSIS — I26.99 OTHER PULMONARY EMBOLISM WITHOUT ACUTE COR PULMONALE: ICD-10-CM

## 2022-12-16 DIAGNOSIS — Z79.01 LONG TERM (CURRENT) USE OF ANTICOAGULANTS: ICD-10-CM

## 2022-12-16 DIAGNOSIS — R55 SYNCOPE AND COLLAPSE: ICD-10-CM

## 2022-12-16 DIAGNOSIS — G93.41 METABOLIC ENCEPHALOPATHY: ICD-10-CM
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
## 2022-12-16 DIAGNOSIS — F05 DELIRIUM DUE TO KNOWN PHYSIOLOGICAL CONDITION: ICD-10-CM

## 2022-12-16 DIAGNOSIS — F03.92 UNSPECIFIED DEMENTIA, UNSPECIFIED SEVERITY, WITH PSYCHOTIC DISTURBANCE: ICD-10-CM

## 2022-12-16 DIAGNOSIS — U09.9 POST COVID-19 CONDITION, UNSPECIFIED: ICD-10-CM

## 2022-12-16 DIAGNOSIS — B96.5 PSEUDOMONAS (AERUGINOSA) (MALLEI) (PSEUDOMALLEI) AS THE CAUSE OF DISEASES CLASSIFIED ELSEWHERE: ICD-10-CM

## 2022-12-16 DIAGNOSIS — Z79.52 LONG TERM (CURRENT) USE OF SYSTEMIC STEROIDS: ICD-10-CM

## 2022-12-16 DIAGNOSIS — I35.0 NONRHEUMATIC AORTIC (VALVE) STENOSIS: ICD-10-CM

## 2022-12-16 DIAGNOSIS — S41.112A LACERATION WITHOUT FOREIGN BODY OF LEFT UPPER ARM, INITIAL ENCOUNTER: ICD-10-CM

## 2022-12-16 DIAGNOSIS — A41.9 SEPSIS, UNSPECIFIED ORGANISM: ICD-10-CM

## 2022-12-16 DIAGNOSIS — R65.21 SEVERE SEPSIS WITH SEPTIC SHOCK: ICD-10-CM
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
## 2022-12-16 DIAGNOSIS — E11.9 TYPE 2 DIABETES MELLITUS WITHOUT COMPLICATIONS: ICD-10-CM

## 2022-12-16 DIAGNOSIS — R45.851 SUICIDAL IDEATIONS: ICD-10-CM

## 2022-12-29 ENCOUNTER — INPATIENT (INPATIENT)
Facility: HOSPITAL | Age: 82
LOS: 4 days | Discharge: NURSING, REHAB OR LONG-TERM CARE | DRG: 884 | End: 2023-01-03
Attending: HOSPITALIST | Admitting: FAMILY MEDICINE
Payer: MEDICARE

## 2022-12-29 VITALS
OXYGEN SATURATION: 100 % | SYSTOLIC BLOOD PRESSURE: 170 MMHG | DIASTOLIC BLOOD PRESSURE: 77 MMHG | WEIGHT: 119.93 LBS | RESPIRATION RATE: 15 BRPM | TEMPERATURE: 98 F | HEIGHT: 65 IN | HEART RATE: 77 BPM

## 2022-12-29 LAB
ALBUMIN SERPL ELPH-MCNC: 2.7 G/DL — LOW (ref 3.3–5)
ALP SERPL-CCNC: 238 U/L — HIGH (ref 40–120)
ALT FLD-CCNC: 22 U/L — SIGNIFICANT CHANGE UP (ref 12–78)
ANION GAP SERPL CALC-SCNC: 8 MMOL/L — SIGNIFICANT CHANGE UP (ref 5–17)
APPEARANCE UR: CLEAR — SIGNIFICANT CHANGE UP
AST SERPL-CCNC: 29 U/L — SIGNIFICANT CHANGE UP (ref 15–37)
BACTERIA # UR AUTO: ABNORMAL
BASOPHILS # BLD AUTO: 0.03 K/UL — SIGNIFICANT CHANGE UP (ref 0–0.2)
BASOPHILS NFR BLD AUTO: 0.4 % — SIGNIFICANT CHANGE UP (ref 0–2)
BILIRUB SERPL-MCNC: 0.5 MG/DL — SIGNIFICANT CHANGE UP (ref 0.2–1.2)
BILIRUB UR-MCNC: NEGATIVE — SIGNIFICANT CHANGE UP
BUN SERPL-MCNC: 14 MG/DL — SIGNIFICANT CHANGE UP (ref 7–23)
CALCIUM SERPL-MCNC: 8.7 MG/DL — SIGNIFICANT CHANGE UP (ref 8.5–10.1)
CHLORIDE SERPL-SCNC: 109 MMOL/L — HIGH (ref 96–108)
CO2 SERPL-SCNC: 24 MMOL/L — SIGNIFICANT CHANGE UP (ref 22–31)
COLOR SPEC: YELLOW — SIGNIFICANT CHANGE UP
COMMENT - URINE: SIGNIFICANT CHANGE UP
CREAT SERPL-MCNC: 0.86 MG/DL — SIGNIFICANT CHANGE UP (ref 0.5–1.3)
DIFF PNL FLD: ABNORMAL
EGFR: 67 ML/MIN/1.73M2 — SIGNIFICANT CHANGE UP
EOSINOPHIL # BLD AUTO: 0.11 K/UL — SIGNIFICANT CHANGE UP (ref 0–0.5)
EOSINOPHIL NFR BLD AUTO: 1.5 % — SIGNIFICANT CHANGE UP (ref 0–6)
EPI CELLS # UR: ABNORMAL
GLUCOSE SERPL-MCNC: 222 MG/DL — HIGH (ref 70–99)
GLUCOSE UR QL: NEGATIVE — SIGNIFICANT CHANGE UP
GRAN CASTS # UR COMP ASSIST: NEGATIVE /LPF — SIGNIFICANT CHANGE UP
HCT VFR BLD CALC: 34.6 % — SIGNIFICANT CHANGE UP (ref 34.5–45)
HGB BLD-MCNC: 11.6 G/DL — SIGNIFICANT CHANGE UP (ref 11.5–15.5)
HYALINE CASTS # UR AUTO: NEGATIVE /LPF — SIGNIFICANT CHANGE UP
IMM GRANULOCYTES NFR BLD AUTO: 1 % — HIGH (ref 0–0.9)
KETONES UR-MCNC: ABNORMAL
LEUKOCYTE ESTERASE UR-ACNC: ABNORMAL
LYMPHOCYTES # BLD AUTO: 1.52 K/UL — SIGNIFICANT CHANGE UP (ref 1–3.3)
LYMPHOCYTES # BLD AUTO: 21 % — SIGNIFICANT CHANGE UP (ref 13–44)
MAGNESIUM SERPL-MCNC: 2.1 MG/DL — SIGNIFICANT CHANGE UP (ref 1.6–2.6)
MCHC RBC-ENTMCNC: 29 PG — SIGNIFICANT CHANGE UP (ref 27–34)
MCHC RBC-ENTMCNC: 33.5 GM/DL — SIGNIFICANT CHANGE UP (ref 32–36)
MCV RBC AUTO: 86.5 FL — SIGNIFICANT CHANGE UP (ref 80–100)
MONOCYTES # BLD AUTO: 0.5 K/UL — SIGNIFICANT CHANGE UP (ref 0–0.9)
MONOCYTES NFR BLD AUTO: 6.9 % — SIGNIFICANT CHANGE UP (ref 2–14)
NEUTROPHILS # BLD AUTO: 5 K/UL — SIGNIFICANT CHANGE UP (ref 1.8–7.4)
NEUTROPHILS NFR BLD AUTO: 69.2 % — SIGNIFICANT CHANGE UP (ref 43–77)
NITRITE UR-MCNC: NEGATIVE — SIGNIFICANT CHANGE UP
PH UR: 6 — SIGNIFICANT CHANGE UP (ref 5–8)
PLATELET # BLD AUTO: 227 K/UL — SIGNIFICANT CHANGE UP (ref 150–400)
POTASSIUM SERPL-MCNC: 3.3 MMOL/L — LOW (ref 3.5–5.3)
POTASSIUM SERPL-SCNC: 3.3 MMOL/L — LOW (ref 3.5–5.3)
PROT SERPL-MCNC: 6.5 GM/DL — SIGNIFICANT CHANGE UP (ref 6–8.3)
PROT UR-MCNC: SIGNIFICANT CHANGE UP MG/DL
RBC # BLD: 4 M/UL — SIGNIFICANT CHANGE UP (ref 3.8–5.2)
RBC # FLD: 16.6 % — HIGH (ref 10.3–14.5)
RBC CASTS # UR COMP ASSIST: NEGATIVE /HPF — SIGNIFICANT CHANGE UP (ref 0–4)
SODIUM SERPL-SCNC: 141 MMOL/L — SIGNIFICANT CHANGE UP (ref 135–145)
SP GR SPEC: 1.02 — SIGNIFICANT CHANGE UP (ref 1.01–1.02)
UROBILINOGEN FLD QL: 1
WBC # BLD: 7.23 K/UL — SIGNIFICANT CHANGE UP (ref 3.8–10.5)
WBC # FLD AUTO: 7.23 K/UL — SIGNIFICANT CHANGE UP (ref 3.8–10.5)
WBC UR QL: SIGNIFICANT CHANGE UP /HPF (ref 0–5)

## 2022-12-29 PROCEDURE — 71045 X-RAY EXAM CHEST 1 VIEW: CPT | Mod: 26

## 2022-12-29 PROCEDURE — 99285 EMERGENCY DEPT VISIT HI MDM: CPT

## 2022-12-29 NOTE — ED PROVIDER NOTE - PROGRESS NOTE DETAILS
Amalia SCRUGGS: received s/o from Dr. Haile pending SW; s/o to Dr. Cheatham still pending SW plan at this time; stable on my shift Received signout on this patient.  Unable to be placed by social work.  Will admit to Dr. Smith.  Ric Cheatham MD.

## 2022-12-29 NOTE — ED ADULT NURSE NOTE - OBJECTIVE STATEMENT
Pt reports to ed from home for aggressive behavior. Pt aox3 reports while at home, daughter changed her phone and she became upset because she wanted it reset, pt reports her daughter could not change it, pt reports started screaming and wanted to leave house.  used [id number] pt Surinamese speaking.Pt denies SI/HI.

## 2022-12-29 NOTE — ED PROVIDER NOTE - OBJECTIVE STATEMENT
History as per family:  pt admitted in Nov for COVID.  dc'd to daughter's house (previously living alone in  and family states that before COVID diagnosis she was completely independent).  readmitted 3d after dc for UTI and agitiation.  was found cutting herself infront of her grandchildren.  had agitation in the hospital as well.  was dc'd again 17d ago to daughter's home and tonight comes in again due to agitation.  thought something was wrong with her phone and even though her family tried to reassure her, she was screaming, trying to leave the house.  family and neighbors called 911.  pt here does state that she got upset about her phone, but "I feel better now"  Family states they can't keep her at home because they don't feel that their children are safe and they don't know what to do.  Pt is oriented to time and place

## 2022-12-29 NOTE — ED ADULT NURSE NOTE - ED STAT RN HANDOFF DETAILS
Pt received from prior RN sleeping but easily awakened awaiting further dispo from psych/swk at this time.

## 2022-12-30 DIAGNOSIS — R45.1 RESTLESSNESS AND AGITATION: ICD-10-CM

## 2022-12-30 PROBLEM — I35.0 NONRHEUMATIC AORTIC (VALVE) STENOSIS: Chronic | Status: ACTIVE | Noted: 2022-11-26

## 2022-12-30 PROBLEM — U07.1 COVID-19: Chronic | Status: ACTIVE | Noted: 2022-11-26

## 2022-12-30 PROBLEM — N39.0 URINARY TRACT INFECTION, SITE NOT SPECIFIED: Chronic | Status: ACTIVE | Noted: 2022-11-26

## 2022-12-30 LAB
FLUAV AG NPH QL: SIGNIFICANT CHANGE UP
FLUBV AG NPH QL: SIGNIFICANT CHANGE UP
RSV RNA NPH QL NAA+NON-PROBE: SIGNIFICANT CHANGE UP
SARS-COV-2 RNA SPEC QL NAA+PROBE: SIGNIFICANT CHANGE UP

## 2022-12-30 PROCEDURE — 84439 ASSAY OF FREE THYROXINE: CPT

## 2022-12-30 PROCEDURE — 87635 SARS-COV-2 COVID-19 AMP PRB: CPT

## 2022-12-30 PROCEDURE — 99223 1ST HOSP IP/OBS HIGH 75: CPT

## 2022-12-30 PROCEDURE — 82962 GLUCOSE BLOOD TEST: CPT

## 2022-12-30 PROCEDURE — 82607 VITAMIN B-12: CPT

## 2022-12-30 PROCEDURE — 84480 ASSAY TRIIODOTHYRONINE (T3): CPT

## 2022-12-30 PROCEDURE — 84443 ASSAY THYROID STIM HORMONE: CPT

## 2022-12-30 PROCEDURE — 93010 ELECTROCARDIOGRAM REPORT: CPT

## 2022-12-30 PROCEDURE — 80048 BASIC METABOLIC PNL TOTAL CA: CPT

## 2022-12-30 PROCEDURE — 36415 COLL VENOUS BLD VENIPUNCTURE: CPT

## 2022-12-30 RX ORDER — ACETAMINOPHEN 500 MG
650 TABLET ORAL EVERY 6 HOURS
Refills: 0 | Status: DISCONTINUED | OUTPATIENT
Start: 2022-12-30 | End: 2023-01-03

## 2022-12-30 RX ORDER — LANOLIN ALCOHOL/MO/W.PET/CERES
5 CREAM (GRAM) TOPICAL AT BEDTIME
Refills: 0 | Status: DISCONTINUED | OUTPATIENT
Start: 2022-12-30 | End: 2023-01-03

## 2022-12-30 RX ORDER — CEFTRIAXONE 500 MG/1
1000 INJECTION, POWDER, FOR SOLUTION INTRAMUSCULAR; INTRAVENOUS ONCE
Refills: 0 | Status: COMPLETED | OUTPATIENT
Start: 2022-12-30 | End: 2022-12-30

## 2022-12-30 RX ORDER — POTASSIUM CHLORIDE 20 MEQ
40 PACKET (EA) ORAL ONCE
Refills: 0 | Status: COMPLETED | OUTPATIENT
Start: 2022-12-30 | End: 2022-12-30

## 2022-12-30 RX ORDER — QUETIAPINE FUMARATE 200 MG/1
25 TABLET, FILM COATED ORAL ONCE
Refills: 0 | Status: COMPLETED | OUTPATIENT
Start: 2022-12-30 | End: 2022-12-30

## 2022-12-30 RX ADMIN — CEFTRIAXONE 1000 MILLIGRAM(S): 500 INJECTION, POWDER, FOR SOLUTION INTRAMUSCULAR; INTRAVENOUS at 01:33

## 2022-12-30 RX ADMIN — Medication 40 MILLIEQUIVALENT(S): at 01:33

## 2022-12-30 RX ADMIN — QUETIAPINE FUMARATE 25 MILLIGRAM(S): 200 TABLET, FILM COATED ORAL at 16:50

## 2022-12-30 NOTE — PATIENT PROFILE ADULT - FALL HARM RISK - HARM RISK INTERVENTIONS

## 2022-12-30 NOTE — H&P ADULT - ASSESSMENT
83 y/o F with PMHx of DM, Moderate AS, Hx of COVID, with recent multiple admissions for COVID/PNA/UTI in early Nov, readmitted in late Nov for UTI/left forearm lacerations (self inflicted) and agitation, presents to  ED for aggressive behavior    #ADMIT: MED/SURG     #Severe Agitation, ?Dementia with psychosis   -Seroquel 25mg BID  -Psych consult   -CO   -psychiatry consult   -f/u TSH and Vitamin B-12   -pt with Hx of hypothyroid in the past was on Synthroid *(not taken in years, per daughter), previously had elevated TSH   -consider starting Synthroid   - consult for placement. Family unable to take care of the patient     #Hx severe Covid with suspected pulmonary micro thrombi  -was prescribed Xarelto 30 day pack which was discontinued due to epistaxis     #DM   -A1c: 7.9 (11/26/22)   -per daughter, pt had bloodwork done this week as outpatient and currently not on any medications. Was previously on Metformin   -ISS       #Hx of recurrent UTIs  -UA: reviewed   -s/p 1x Rocephin in ED  -pt w/o urinary symptoms   -f/u UCx   -will hold off on abx at this time     #DVT prophylaxis   -Lovenox     #Dispo   -awaiting placement        83 y/o F with PMHx of DM, Moderate AS, Hx of COVID, with recent multiple admissions for COVID/PNA/UTI in early Nov, readmitted in late Nov for UTI/left forearm lacerations (self inflicted) and agitation, presents to  ED for aggressive behavior    #ADMIT: MED/SURG     #Severe Agitation, ?Dementia with psychosis   -Seroquel 25mg BID  -Psych consult   -CO   -psychiatry consult   -f/u TSH and Vitamin B-12   -pt with Hx of hypothyroid in the past was on Synthroid *(not taken in years, per daughter), previously had elevated TSH   -consider starting Synthroid   - consult for placement. Family unable to take care of the patient     #Hx severe Covid with suspected pulmonary micro thrombi  -was prescribed Xarelto 30 day pack which was discontinued due to epistaxis     #DM   -A1c: 7.9 (11/26/22)   -per daughter, pt had bloodwork done this week as outpatient and currently not on any medications. Was previously on Metformin   -ISS       #Hx of recurrent UTIs  -UA: reviewed   -s/p 1x Rocephin in ED  -pt w/o urinary symptoms   -f/u UCx   -will hold off on abx at this time     #DVT prophylaxis   -Lovenox     #Dispo   -awaiting placement     Discussed with daughter over the phone

## 2022-12-30 NOTE — H&P ADULT - HISTORY OF PRESENT ILLNESS
81 y/o F with PMHx of DM, Moderate AS, Hx of COVID, with recent multiple admissions for COVID/PNA/UTI in early Nov, readmitted in late Nov for UTI/left forearm lacerations (self inflicted) and agitation, presents to  ED for aggressive behavior. Per daughter prior to COVID, pt was living independently. Since the admission in early November, patient has had progressively decline in her health and has been agitated. On last admission, pt was was found cutting herself in front of her grandchildren. Patient was discharged home and was living with daughter.   Today, patient thought that there was something wrong with her phone and started screaming. Family tried to reorient and calm the patient but was unsuccessful. Patient tried to leave the home. Family and neighbours called 911 and patient was brought to  hospital. Daughter feels that her mother continues to decline mentally. She has been refusing her medications and feels it won't be safe for the patient to stay home if she's not medically optimized. Patient has no acute complaints at this time. Denies urinary symptoms. AAOx3 (place, person and time) but not to situation.  83 y/o F with PMHx of DM, Moderate AS, Hx of COVID, with recent multiple admissions for COVID/PNA/UTI in early Nov, readmitted in late Nov for UTI/left forearm lacerations (self inflicted) and agitation, presents to  ED for aggressive behavior. Per daughter prior to COVID, pt was living independently. Since the admission in early November, patient has had progressively decline in her health and has been agitated. On last admission, pt was was found cutting herself in front of her grandchildren. Patient was discharged home and was living with daughter.   Today, patient thought that there was something wrong with her phone and started screaming. Family tried to reorient and calm the patient but was unsuccessful. Patient tried to leave the home. Family and neighbours called 911 and patient was brought to  hospital. Daughter feels that her mother continues to decline mentally. She has been refusing her medications and feels it won't be safe for the patient to stay home if she's not medically optimized. Patient has no acute complaints at this time. Denies urinary symptoms. AAOx3 (place, person and time) but not to situation.

## 2022-12-30 NOTE — H&P ADULT - NSHPREVIEWOFSYSTEMS_GEN_ALL_CORE
REVIEW OF SYSTEMS:  CONSTITUTIONAL: (-) weakness, (-) fevers, (-) chills  EYES/ENT: (-) visual changes,  (-) vertigo,  (-) throat pain   NECK:  (-) pain, (-) stiffness  RESPIRATORY:  (-) shortness of breath, (-) cough,  (-) wheezing,  (-) hemoptysis   CARDIOVASCULAR:  (-) chest pain, (-) palpitations  GASTROINTESTINAL:  (-) abdominal or epigastric pain, (-) nausea, (-) vomiting, (-) diarrhea, (-) constipation, (-) melena,  (-) hematemesis,  (-) hematochezia  GENITOURINARY: (-) dysuria, (-) frequency, (-) hematuria  NEUROLOGICAL: (-) numbness, (-) weakness  SKIN: (-) itching, (-) rashes, (-) lesions

## 2022-12-30 NOTE — H&P ADULT - NSHPPHYSICALEXAM_GEN_ALL_CORE
Vitals  T(F): 98.1 (12-30-22 @ 22:20), Max: 98.3 (12-30-22 @ 03:50)  HR: 72 (12-30-22 @ 22:20) (67 - 74)  BP: 146/75 (12-30-22 @ 22:20) (123/64 - 146/75)  RR: 20 (12-30-22 @ 22:20) (18 - 20)  SpO2: 100% (12-30-22 @ 22:20) (99% - 100%)    PHYSICAL EXAM   Gen: NAD, AA&Ox3 (name, person and place),+not oriented to situation, +histrionic   HEENT: head atraumatic and normocephalic, PEERLA, EOMI,  no gross abnormalities of ears, mucous membranes moist, no oral lesions, neck supple without masses/goiter/lymphadenopathy, no JVD  CVS: +s1, s2, regular rate and rhythm, +systolic murmur   Pulmonary: normal respiratory effort, clear to auscultation b/l, no wheezes/crackles/rhonchi  Abdomen: soft, non-tender, non-distended, +bowel sounds in all 4 quadrants, no masses noted, no guarding or rigidity   Back: no scoliosis, lordosis, or kyphosis, no point tenderness, no CVA tenderness   Extremities: no pedal edema, pedal pulses palpable, <2 sec. cap refill   Skin: nl warm and dry, +old healed scars noted on the left forearm   Neuro: grossly non-focal Vitals  T(F): 98.1 (12-30-22 @ 22:20), Max: 98.3 (12-30-22 @ 03:50)  HR: 72 (12-30-22 @ 22:20) (67 - 74)  BP: 146/75 (12-30-22 @ 22:20) (123/64 - 146/75)  RR: 20 (12-30-22 @ 22:20) (18 - 20)  SpO2: 100% (12-30-22 @ 22:20) (99% - 100%)    PHYSICAL EXAM   Gen: NAD, AA&Ox3 (name, person and place),+not oriented to situation, +intermittent episodes of crying   HEENT: head atraumatic and normocephalic, PEERLA, EOMI,  no gross abnormalities of ears, mucous membranes moist, no oral lesions, neck supple without masses/goiter/lymphadenopathy, no JVD  CVS: +s1, s2, regular rate and rhythm, +systolic murmur   Pulmonary: normal respiratory effort, clear to auscultation b/l, no wheezes/crackles/rhonchi  Abdomen: soft, non-tender, non-distended, +bowel sounds in all 4 quadrants, no masses noted, no guarding or rigidity   Back: no scoliosis, lordosis, or kyphosis, no point tenderness, no CVA tenderness   Extremities: no pedal edema, pedal pulses palpable, <2 sec. cap refill   Skin: nl warm and dry, +old healed scars noted on the left forearm   Neuro: grossly non-focal

## 2022-12-30 NOTE — ED ADULT NURSE REASSESSMENT NOTE - NS ED NURSE REASSESS COMMENT FT1
Pt received from dayshift RN. Pt resting in stretcher, side rails up, bed in lowest position. Pt with enhanced supervision at bedside for safety. Pt awaiting bed on inpatient unit.

## 2022-12-30 NOTE — ED BEHAVIORAL HEALTH NOTE - BEHAVIORAL HEALTH NOTE
Spoke with daughter in regards to situation with patient.  She states that since discharge from  in November, patient continues to decline mentally.  She feels she has dementia with aggression that is getting worse.  She doesn't feel as if her children are safe around her mother due to aggressive behaviors such as yelling and screaming, running out of the house in the middle of the night and one month ago she cut her arm with a knife in front of the kids.  She didn't want her mom placed in a nursing home but may not have a choice given the behaviors.  She states her mom also refuses meds which is why she cannot stabilize.  Patient was cleared by psych.  Patient may need social admission for placement.  Will discuss with ED social work/CM staff.

## 2022-12-30 NOTE — H&P ADULT - NSICDXPASTMEDICALHX_GEN_ALL_CORE_FT
PAST MEDICAL HISTORY:  2019 novel coronavirus disease (COVID-19)     E. coli UTI (urinary tract infection)     Moderate aortic stenosis      PAST MEDICAL HISTORY:  2019 novel coronavirus disease (COVID-19)     DM (diabetes mellitus)     E. coli UTI (urinary tract infection)     Hypothyroid     Moderate aortic stenosis

## 2022-12-30 NOTE — H&P ADULT - NSVTERISKASSESS_GEN_ALL_CORE FT
Nurse's notes reviewed and accepted.    SUBJECTIVE: She presents with complaints of fever to 101.8 degrees, sore throat and sneezing. She has been feeling ill for 3 days. She denies chills, sinus pressure or drainage, cough, hemoptysis and ear pain.    Denies any possibility of pregnancy. She denies unprotected intercourse.    Denies breast feeding.    OBJECTIVE: In general she is in no acute distress. On today's exam the TMs (tympanic membranes) are normal, landmarks are visualized. There is no sinus pressure tenderness. Pharynx is erythematous without tonsillar enlargement, exudate or vesicle. Neck is supple, no adenopathy in the anterior cervical chain or supraclavicular region. Lungs are clear to auscultation. Heart has a regular rate and rhythm, no murmur, rub or S3.    Impression: Pharyngitis.    Plan: Doxycycline.           F/U PMD 2 weeks if no better. Sooner if worse.     Medical Assessment Completed on: 31-Dec-2022 01:06

## 2022-12-30 NOTE — PATIENT PROFILE ADULT - FUNCTIONAL ASSESSMENT - BASIC MOBILITY 6.
3-calculated by average/Not able to assess (calculate score using [facility] averaging method)

## 2022-12-31 LAB
ADD ON TEST-SPECIMEN IN LAB: SIGNIFICANT CHANGE UP
ANION GAP SERPL CALC-SCNC: 7 MMOL/L — SIGNIFICANT CHANGE UP (ref 5–17)
BUN SERPL-MCNC: 11 MG/DL — SIGNIFICANT CHANGE UP (ref 7–23)
CALCIUM SERPL-MCNC: 8.5 MG/DL — SIGNIFICANT CHANGE UP (ref 8.5–10.1)
CHLORIDE SERPL-SCNC: 113 MMOL/L — HIGH (ref 96–108)
CO2 SERPL-SCNC: 23 MMOL/L — SIGNIFICANT CHANGE UP (ref 22–31)
CREAT SERPL-MCNC: 0.59 MG/DL — SIGNIFICANT CHANGE UP (ref 0.5–1.3)
CULTURE RESULTS: SIGNIFICANT CHANGE UP
EGFR: 89 ML/MIN/1.73M2 — SIGNIFICANT CHANGE UP
GLUCOSE BLDC GLUCOMTR-MCNC: 101 MG/DL — HIGH (ref 70–99)
GLUCOSE BLDC GLUCOMTR-MCNC: 121 MG/DL — HIGH (ref 70–99)
GLUCOSE BLDC GLUCOMTR-MCNC: 129 MG/DL — HIGH (ref 70–99)
GLUCOSE BLDC GLUCOMTR-MCNC: 133 MG/DL — HIGH (ref 70–99)
GLUCOSE SERPL-MCNC: 137 MG/DL — HIGH (ref 70–99)
POTASSIUM SERPL-MCNC: 3.4 MMOL/L — LOW (ref 3.5–5.3)
POTASSIUM SERPL-SCNC: 3.4 MMOL/L — LOW (ref 3.5–5.3)
SODIUM SERPL-SCNC: 143 MMOL/L — SIGNIFICANT CHANGE UP (ref 135–145)
SPECIMEN SOURCE: SIGNIFICANT CHANGE UP
T4 FREE SERPL-MCNC: 0.8 NG/DL — SIGNIFICANT CHANGE UP (ref 0.76–1.46)
TSH SERPL-MCNC: 7.39 UU/ML — HIGH (ref 0.34–4.82)

## 2022-12-31 PROCEDURE — 99233 SBSQ HOSP IP/OBS HIGH 50: CPT

## 2022-12-31 RX ORDER — GLUCAGON INJECTION, SOLUTION 0.5 MG/.1ML
1 INJECTION, SOLUTION SUBCUTANEOUS ONCE
Refills: 0 | Status: DISCONTINUED | OUTPATIENT
Start: 2022-12-31 | End: 2023-01-03

## 2022-12-31 RX ORDER — INSULIN LISPRO 100/ML
VIAL (ML) SUBCUTANEOUS
Refills: 0 | Status: DISCONTINUED | OUTPATIENT
Start: 2022-12-31 | End: 2023-01-03

## 2022-12-31 RX ORDER — QUETIAPINE FUMARATE 200 MG/1
25 TABLET, FILM COATED ORAL
Refills: 0 | Status: DISCONTINUED | OUTPATIENT
Start: 2022-12-31 | End: 2023-01-03

## 2022-12-31 RX ORDER — INSULIN LISPRO 100/ML
VIAL (ML) SUBCUTANEOUS AT BEDTIME
Refills: 0 | Status: DISCONTINUED | OUTPATIENT
Start: 2022-12-31 | End: 2023-01-03

## 2022-12-31 RX ORDER — SODIUM CHLORIDE 9 MG/ML
1000 INJECTION, SOLUTION INTRAVENOUS
Refills: 0 | Status: DISCONTINUED | OUTPATIENT
Start: 2022-12-31 | End: 2023-01-03

## 2022-12-31 RX ORDER — POTASSIUM CHLORIDE 20 MEQ
40 PACKET (EA) ORAL ONCE
Refills: 0 | Status: COMPLETED | OUTPATIENT
Start: 2022-12-31 | End: 2023-01-02

## 2022-12-31 RX ORDER — QUETIAPINE FUMARATE 200 MG/1
25 TABLET, FILM COATED ORAL EVERY 6 HOURS
Refills: 0 | Status: DISCONTINUED | OUTPATIENT
Start: 2022-12-31 | End: 2023-01-03

## 2022-12-31 RX ORDER — DEXTROSE 50 % IN WATER 50 %
15 SYRINGE (ML) INTRAVENOUS ONCE
Refills: 0 | Status: DISCONTINUED | OUTPATIENT
Start: 2022-12-31 | End: 2023-01-03

## 2022-12-31 RX ORDER — DEXTROSE 50 % IN WATER 50 %
12.5 SYRINGE (ML) INTRAVENOUS ONCE
Refills: 0 | Status: DISCONTINUED | OUTPATIENT
Start: 2022-12-31 | End: 2023-01-03

## 2022-12-31 RX ORDER — ENOXAPARIN SODIUM 100 MG/ML
40 INJECTION SUBCUTANEOUS EVERY 24 HOURS
Refills: 0 | Status: DISCONTINUED | OUTPATIENT
Start: 2022-12-31 | End: 2023-01-03

## 2022-12-31 RX ORDER — HALOPERIDOL DECANOATE 100 MG/ML
1 INJECTION INTRAMUSCULAR ONCE
Refills: 0 | Status: COMPLETED | OUTPATIENT
Start: 2022-12-31 | End: 2022-12-31

## 2022-12-31 RX ORDER — DEXTROSE 50 % IN WATER 50 %
25 SYRINGE (ML) INTRAVENOUS ONCE
Refills: 0 | Status: DISCONTINUED | OUTPATIENT
Start: 2022-12-31 | End: 2023-01-03

## 2022-12-31 RX ORDER — RIVAROXABAN 15 MG-20MG
20 KIT ORAL
Refills: 0 | Status: DISCONTINUED | OUTPATIENT
Start: 2022-12-31 | End: 2022-12-31

## 2022-12-31 RX ADMIN — QUETIAPINE FUMARATE 25 MILLIGRAM(S): 200 TABLET, FILM COATED ORAL at 10:00

## 2022-12-31 RX ADMIN — HALOPERIDOL DECANOATE 1 MILLIGRAM(S): 100 INJECTION INTRAMUSCULAR at 04:26

## 2022-12-31 RX ADMIN — ENOXAPARIN SODIUM 40 MILLIGRAM(S): 100 INJECTION SUBCUTANEOUS at 05:15

## 2022-12-31 RX ADMIN — QUETIAPINE FUMARATE 25 MILLIGRAM(S): 200 TABLET, FILM COATED ORAL at 21:21

## 2022-12-31 NOTE — PROVIDER CONTACT NOTE (OTHER) - BACKGROUND
down as to not go into room, 6 staff members had to carry pt back into room and place in the bed. Security at bedside. Posey vest placed on pt. Provider made aware. Provider ordered

## 2022-12-31 NOTE — PROGRESS NOTE ADULT - SUBJECTIVE AND OBJECTIVE BOX
Reason for Admission: agitation  History of Present Illness:   83 y/o F with PMHx of DM, Moderate AS, Hx of COVID, with recent multiple admissions for COVID/PNA/UTI in early Nov, readmitted in late Nov for UTI/left forearm lacerations (self inflicted) and agitation, presents to  ED for aggressive behavior. Per daughter prior to COVID, pt was living independently. Since the admission in early November, patient has had progressively decline in her health and has been agitated. On last admission, pt was was found cutting herself in front of her grandchildren. Patient was discharged home and was living with daughter.   Today, patient thought that there was something wrong with her phone and started screaming. Family tried to reorient and calm the patient but was unsuccessful. Patient tried to leave the home. Family and neighbours called 911 and patient was brought to  hospital. Daughter feels that her mother continues to decline mentally. She has been refusing her medications and feels it won't be safe for the patient to stay home if she's not medically optimized. Patient has no acute complaints at this time. Denies urinary symptoms. AAOx3 (place, person and time) but not to situation.      - agitated overnight requiring 1:1 and code grey.    ROS:   All 10 systems reviewed and found to be negative with the exception of what has been described above.    GEN: lying in bed, NAD  HEENT:   NC/AT, pupils equal and reactive, EOMI  CV:  +S1, +S2, RRR  RESP:   lungs clear to auscultation bilaterally, no wheeze, rales, rhonchi   BREAST:  not examined  GI:  abdomen soft, non-tender, non-distended, normoactive BS  RECTAL:  not examined  :  not examined  MSK:   normal muscle tone  EXT:  no edema  NEURO:  AAOX3, no focal neurological deficits  SKIN:  no rashes        PHYSICAL EXAM   Gen: NAD, AA&Ox3 (name, person and place),+not oriented to situation, +intermittent episodes of crying   HEENT: head atraumatic and normocephalic, PEERLA, EOMI,  no gross abnormalities of ears, mucous membranes moist, no oral lesions, neck supple without masses/goiter/lymphadenopathy, no JVD  CVS: +s1, s2, regular rate and rhythm, +systolic murmur   Pulmonary: normal respiratory effort, clear to auscultation b/l, no wheezes/crackles/rhonchi  Abdomen: soft, non-tender, non-distended, +bowel sounds in all 4 quadrants, no masses noted, no guarding or rigidity   Back: no scoliosis, lordosis, or kyphosis, no point tenderness, no CVA tenderness   Extremities: no pedal edema, pedal pulses palpable, <2 sec. cap refill   Skin: nl warm and dry, +old healed scars noted on the left forearm   Neuro: grossly non-focal                          11.6   7.23  )-----------( 227      ( 29 Dec 2022 23:15 )             34.6     -    143  |  113<H>  |  11  ----------------------------<  137<H>  3.4<L>   |  23  |  0.59    Ca    8.5      31 Dec 2022 06:52  Mg     2.1         TPro  6.5  /  Alb  2.7<L>  /  TBili  0.5  /  DBili  x   /  AST  29  /  ALT  22  /  AlkPhos  238<H>          LIVER FUNCTIONS - ( 29 Dec 2022 23:15 )  Alb: 2.7 g/dL / Pro: 6.5 gm/dL / ALK PHOS: 238 U/L / ALT: 22 U/L / AST: 29 U/L / GGT: x             Urinalysis Basic - ( 29 Dec 2022 23:24 )    Color: Yellow / Appearance: Clear / S.020 / pH: x  Gluc: x / Ketone: Trace  / Bili: Negative / Urobili: 1   Blood: x / Protein: Trace mg/dL / Nitrite: Negative   Leuk Esterase: Moderate / RBC: Negative /HPF / WBC 3-5 /HPF   Sq Epi: x / Non Sq Epi: Moderate / Bacteria: Few      Radiology:   X-Ray, Fluoroscopy:    29-Dec-2022 23:07, Xray Chest 1 View- PORTABLE-Urgent  PACS Image: Image(s) Available  Xray Chest 1 View- PORTABLE-Urgent:   	ACC: [id number] EXAM:  XR CHEST PORTABLE URGENT 1V                        	  	PROCEDURE DATE:  2022    	  	  	  	INTERPRETATION:  AP chest on 2022 at 10:57 PM. Patient has   	altered mental status.  	  	Heart size normal.  	  	On  there is interstitial infiltration concentrated in the   	right lung greater on the left and more pronounced in the inferior lung.  	  	On present examination these infiltrates are again noted but there is   	significant improvement.  	  	IMPRESSION: Significant improvement in interstitial infiltration. Mild   	residual.  	  	--- End of Report ---     Assessment:  · Assessment	  83 y/o F with PMHx of DM, Moderate AS, Hx of COVID, with recent multiple admissions for COVID/PNA/UTI in early Nov, readmitted in late Nov for UTI/left forearm lacerations (self inflicted) and agitation, presents to  ED for aggressive behavior    #Severe Agitation, ?Dementia with psychosis   -Seroquel 25mg BID  -Psych consult pending  -CO til cleared by psych  -f/u TSH and Vitamin B-12   -pt with Hx of hypothyroid in the past was on Synthroid *(not taken in years, per daughter), previously had elevated TSH will check t3/t4  -SW consult for placement. Family unable to take care of the patient     #Hx severe Covid with suspected pulmonary micro thrombi  -was prescribed Xarelto 30 day pack which was discontinued due to epistaxis     #DM   -A1c: 7.9 (22)   -per daughter, pt had bloodwork done this week as outpatient and currently not on any medications. Was previously on Metformin   -ISS     #Hx of recurrent UTIs  -UA: reviewed   -s/p 1x Rocephin in ED  -pt w/o urinary symptoms   -f/u UCx   -will hold off on abx at this time     #DVT prophylaxis   -Lovenox     #Dispo   -awaiting placement     Discussed with daughter over the phone          Reason for Admission: agitation  History of Present Illness:   81 y/o F with PMHx of DM, Moderate AS, Hx of COVID, with recent multiple admissions for COVID/PNA/UTI in early Nov, readmitted in late Nov for UTI/left forearm lacerations (self inflicted) and agitation, presents to  ED for aggressive behavior. Per daughter prior to COVID, pt was living independently. Since the admission in early November, patient has had progressively decline in her health and has been agitated. On last admission, pt was was found cutting herself in front of her grandchildren. Patient was discharged home and was living with daughter.   Today, patient thought that there was something wrong with her phone and started screaming. Family tried to reorient and calm the patient but was unsuccessful. Patient tried to leave the home. Family and neighbours called 911 and patient was brought to  hospital. Daughter feels that her mother continues to decline mentally. She has been refusing her medications and feels it won't be safe for the patient to stay home if she's not medically optimized. Patient has no acute complaints at this time. Denies urinary symptoms. AAOx3 (place, person and time) but not to situation.      - agitated overnight requiring 1:1 and code grey.    ROS:   All 10 systems reviewed and found to be negative with the exception of what has been described above.    vitals stable - reviewed    GEN: lying in bed, NAD  HEENT:   NC/AT, pupils equal and reactive, EOMI  CV:  +S1, +S2, RRR  RESP:   lungs clear to auscultation bilaterally, no wheeze, rales, rhonchi   BREAST:  not examined  GI:  abdomen soft, non-tender, non-distended, normoactive BS  RECTAL:  not examined  :  not examined  MSK:   normal muscle tone  EXT:  no edema  NEURO:  AAOX3, no focal neurological deficits  SKIN:  no rashes                          11.6   7.23  )-----------( 227      ( 29 Dec 2022 23:15 )             34.6         143  |  113<H>  |  11  ----------------------------<  137<H>  3.4<L>   |  23  |  0.59    Ca    8.5      31 Dec 2022 06:52  Mg     2.1     12-29    TPro  6.5  /  Alb  2.7<L>  /  TBili  0.5  /  DBili  x   /  AST  29  /  ALT  22  /  AlkPhos  238<H>          LIVER FUNCTIONS - ( 29 Dec 2022 23:15 )  Alb: 2.7 g/dL / Pro: 6.5 gm/dL / ALK PHOS: 238 U/L / ALT: 22 U/L / AST: 29 U/L / GGT: x             Urinalysis Basic - ( 29 Dec 2022 23:24 )    Color: Yellow / Appearance: Clear / S.020 / pH: x  Gluc: x / Ketone: Trace  / Bili: Negative / Urobili: 1   Blood: x / Protein: Trace mg/dL / Nitrite: Negative   Leuk Esterase: Moderate / RBC: Negative /HPF / WBC 3-5 /HPF   Sq Epi: x / Non Sq Epi: Moderate / Bacteria: Few      Radiology:   X-Ray, Fluoroscopy:    29-Dec-2022 23:07, Xray Chest 1 View- PORTABLE-Urgent  PACS Image: Image(s) Available  Xray Chest 1 View- PORTABLE-Urgent:   	ACC: [id number] EXAM:  XR CHEST PORTABLE URGENT 1V                        	  	PROCEDURE DATE:  2022    	  	  	  	INTERPRETATION:  AP chest on 2022 at 10:57 PM. Patient has   	altered mental status.  	  	Heart size normal.  	  	On  there is interstitial infiltration concentrated in the   	right lung greater on the left and more pronounced in the inferior lung.  	  	On present examination these infiltrates are again noted but there is   	significant improvement.  	  	IMPRESSION: Significant improvement in interstitial infiltration. Mild   	residual.  	  	--- End of Report ---     Assessment:  · Assessment	  81 y/o F with PMHx of DM, Moderate AS, Hx of COVID, with recent multiple admissions for COVID/PNA/UTI in early Nov, readmitted in late Nov for UTI/left forearm lacerations (self inflicted) and agitation, presents to  ED for aggressive behavior    #Severe Agitation, ?Dementia with psychosis   -Seroquel 25mg BID  -Psych consult pending  -CO til cleared by psych  -f/u TSH and Vitamin B-12   -pt with Hx of hypothyroid in the past was on Synthroid *(not taken in years, per daughter), previously had elevated TSH will check t3/t4  - consult for placement. Family unable to take care of the patient     #Hx severe Covid with suspected pulmonary micro thrombi  -was prescribed Xarelto 30 day pack which was discontinued due to epistaxis     #DM   -A1c: 7.9 (22)   -per daughter, pt had bloodwork done this week as outpatient and currently not on any medications. Was previously on Metformin   -ISS     #Hx of recurrent UTIs  -UA: reviewed   -s/p 1x Rocephin in ED  -pt w/o urinary symptoms   -f/u UCx   -will hold off on abx at this time     #DVT prophylaxis   -Lovenox     #Dispo   -awaiting placement     Discussed with daughter over the phone

## 2022-12-31 NOTE — PROVIDER CONTACT NOTE (OTHER) - SITUATION
pt jumped oob grabbed her shoes and jacket and attempting to leave. Staff tried to persuade pt to go back into b and she refused. Pt kicking and holding onto furniture and attempting to drop her body

## 2023-01-01 LAB
ANION GAP SERPL CALC-SCNC: 8 MMOL/L — SIGNIFICANT CHANGE UP (ref 5–17)
BUN SERPL-MCNC: 11 MG/DL — SIGNIFICANT CHANGE UP (ref 7–23)
CALCIUM SERPL-MCNC: 8.5 MG/DL — SIGNIFICANT CHANGE UP (ref 8.5–10.1)
CHLORIDE SERPL-SCNC: 113 MMOL/L — HIGH (ref 96–108)
CO2 SERPL-SCNC: 22 MMOL/L — SIGNIFICANT CHANGE UP (ref 22–31)
CREAT SERPL-MCNC: 0.78 MG/DL — SIGNIFICANT CHANGE UP (ref 0.5–1.3)
EGFR: 75 ML/MIN/1.73M2 — SIGNIFICANT CHANGE UP
GLUCOSE BLDC GLUCOMTR-MCNC: 102 MG/DL — HIGH (ref 70–99)
GLUCOSE BLDC GLUCOMTR-MCNC: 124 MG/DL — HIGH (ref 70–99)
GLUCOSE BLDC GLUCOMTR-MCNC: 130 MG/DL — HIGH (ref 70–99)
GLUCOSE BLDC GLUCOMTR-MCNC: 134 MG/DL — HIGH (ref 70–99)
GLUCOSE SERPL-MCNC: 109 MG/DL — HIGH (ref 70–99)
POTASSIUM SERPL-MCNC: 3.3 MMOL/L — LOW (ref 3.5–5.3)
POTASSIUM SERPL-SCNC: 3.3 MMOL/L — LOW (ref 3.5–5.3)
SODIUM SERPL-SCNC: 143 MMOL/L — SIGNIFICANT CHANGE UP (ref 135–145)
T3 SERPL-MCNC: 90 NG/DL — SIGNIFICANT CHANGE UP (ref 80–200)

## 2023-01-01 PROCEDURE — 99232 SBSQ HOSP IP/OBS MODERATE 35: CPT

## 2023-01-01 RX ORDER — POTASSIUM CHLORIDE 20 MEQ
40 PACKET (EA) ORAL ONCE
Refills: 0 | Status: COMPLETED | OUTPATIENT
Start: 2023-01-01 | End: 2023-01-01

## 2023-01-01 RX ADMIN — QUETIAPINE FUMARATE 25 MILLIGRAM(S): 200 TABLET, FILM COATED ORAL at 09:38

## 2023-01-01 RX ADMIN — Medication 40 MILLIEQUIVALENT(S): at 19:03

## 2023-01-01 RX ADMIN — QUETIAPINE FUMARATE 25 MILLIGRAM(S): 200 TABLET, FILM COATED ORAL at 22:56

## 2023-01-01 RX ADMIN — ENOXAPARIN SODIUM 40 MILLIGRAM(S): 100 INJECTION SUBCUTANEOUS at 06:09

## 2023-01-01 NOTE — PROGRESS NOTE ADULT - SUBJECTIVE AND OBJECTIVE BOX
Reason for Admission: agitation  History of Present Illness:   81 y/o F with PMHx of DM, Moderate AS, Hx of COVID, with recent multiple admissions for COVID/PNA/UTI in early Nov, readmitted in late Nov for UTI/left forearm lacerations (self inflicted) and agitation, presents to  ED for aggressive behavior. Per daughter prior to COVID, pt was living independently. Since the admission in early November, patient has had progressively decline in her health and has been agitated. On last admission, pt was was found cutting herself in front of her grandchildren. Patient was discharged home and was living with daughter.   Today, patient thought that there was something wrong with her phone and started screaming. Family tried to reorient and calm the patient but was unsuccessful. Patient tried to leave the home. Family and neighbours called 911 and patient was brought to  hospital. Daughter feels that her mother continues to decline mentally. She has been refusing her medications and feels it won't be safe for the patient to stay home if she's not medically optimized. Patient has no acute complaints at this time. Denies urinary symptoms. AAOx3 (place, person and time) but not to situation.     12/31 - agitated overnight requiring 1:1 and code grey.  1/1 - agitated at times requiring 1:1.  tearful this AM wanting to speak with daughter     ROS:   All 10 systems reviewed and found to be negative with the exception of what has been described above.    Vital Signs Last 24 Hrs  T(C): 36.7 (01 Jan 2023 07:51), Max: 36.8 (31 Dec 2022 21:18)  T(F): 98 (01 Jan 2023 07:51), Max: 98.2 (31 Dec 2022 21:18)  HR: 63 (01 Jan 2023 07:51) (58 - 63)  BP: 111/68 (01 Jan 2023 07:51) (111/68 - 132/71)  BP(mean): --  RR: 18 (01 Jan 2023 07:51) (18 - 18)  SpO2: 100% (01 Jan 2023 07:51) (98% - 100%)    Parameters below as of 01 Jan 2023 07:51  Patient On (Oxygen Delivery Method): room air        GEN: lying in bed, NAD  HEENT:   NC/AT, pupils equal and reactive, EOMI  CV:  +S1, +S2, RRR  RESP:   lungs clear to auscultation bilaterally, no wheeze, rales, rhonchi   BREAST:  not examined  GI:  abdomen soft, non-tender, non-distended, normoactive BS  RECTAL:  not examined  :  not examined  MSK:   normal muscle tone  EXT:  no edema  NEURO:  AAOX3, no focal neurological deficits  SKIN:  no rashes            01-01    143  |  113<H>  |  11  ----------------------------<  109<H>  3.3<L>   |  22  |  0.78    Ca    8.5      01 Jan 2023 09:22                Radiology:   X-Ray, Fluoroscopy:    29-Dec-2022 23:07, Xray Chest 1 View- PORTABLE-Urgent  PACS Image: Image(s) Available  Xray Chest 1 View- PORTABLE-Urgent:   	ACC: [id number] EXAM:  XR CHEST PORTABLE URGENT 1V                        	  	PROCEDURE DATE:  12/29/2022    	  	  	  	INTERPRETATION:  AP chest on December 29, 2022 at 10:57 PM. Patient has   	altered mental status.  	  	Heart size normal.  	  	On November 28 there is interstitial infiltration concentrated in the   	right lung greater on the left and more pronounced in the inferior lung.  	  	On present examination these infiltrates are again noted but there is   	significant improvement.  	  	IMPRESSION: Significant improvement in interstitial infiltration. Mild   	residual.  	  	--- End of Report ---     Assessment:  · Assessment	  81 y/o F with PMHx of DM, Moderate AS, Hx of COVID, with recent multiple admissions for COVID/PNA/UTI in early Nov, readmitted in late Nov for UTI/left forearm lacerations (self inflicted) and agitation, presents to  ED for aggressive behavior    #Severe Agitation, ?Dementia with psychosis   -Seroquel 25mg BID  -Psych consult pending  -CO til cleared by psych  -f/u TSH and Vitamin B-12   -pt with Hx of hypothyroid in the past was on Synthroid *(not taken in years, per daughter), previously had elevated TSH will check t3/t4  - consult for placement. Family unable to take care of the patient     #Hx severe Covid with suspected pulmonary micro thrombi  -was prescribed Xarelto 30 day pack which was discontinued due to epistaxis     #DM   -A1c: 7.9 (11/26/22)   -per daughter, pt had bloodwork done this week as outpatient and currently not on any medications. Was previously on Metformin   -ISS     #Hx of recurrent UTIs  -UA: reviewed   -s/p 1x Rocephin in ED  -pt w/o urinary symptoms   -f/u UCx - contaminated.  will resend on 1/1  -will hold off on abx at this time     #DVT prophylaxis   -Lovenox     #Dispo   -awaiting placement     Discussed with daughter over the phone

## 2023-01-02 LAB
GLUCOSE BLDC GLUCOMTR-MCNC: 125 MG/DL — HIGH (ref 70–99)
GLUCOSE BLDC GLUCOMTR-MCNC: 140 MG/DL — HIGH (ref 70–99)
GLUCOSE BLDC GLUCOMTR-MCNC: 163 MG/DL — HIGH (ref 70–99)
GLUCOSE BLDC GLUCOMTR-MCNC: 93 MG/DL — SIGNIFICANT CHANGE UP (ref 70–99)
VIT B12 SERPL-MCNC: 258 PG/ML — SIGNIFICANT CHANGE UP (ref 232–1245)

## 2023-01-02 PROCEDURE — 99232 SBSQ HOSP IP/OBS MODERATE 35: CPT

## 2023-01-02 PROCEDURE — 90792 PSYCH DIAG EVAL W/MED SRVCS: CPT

## 2023-01-02 RX ORDER — QUETIAPINE FUMARATE 200 MG/1
25 TABLET, FILM COATED ORAL
Refills: 0 | Status: DISCONTINUED | OUTPATIENT
Start: 2023-01-02 | End: 2023-01-03

## 2023-01-02 RX ADMIN — QUETIAPINE FUMARATE 25 MILLIGRAM(S): 200 TABLET, FILM COATED ORAL at 20:35

## 2023-01-02 RX ADMIN — QUETIAPINE FUMARATE 25 MILLIGRAM(S): 200 TABLET, FILM COATED ORAL at 09:36

## 2023-01-02 RX ADMIN — Medication 2: at 11:54

## 2023-01-02 RX ADMIN — QUETIAPINE FUMARATE 25 MILLIGRAM(S): 200 TABLET, FILM COATED ORAL at 17:39

## 2023-01-02 RX ADMIN — ENOXAPARIN SODIUM 40 MILLIGRAM(S): 100 INJECTION SUBCUTANEOUS at 09:36

## 2023-01-02 RX ADMIN — Medication 5 MILLIGRAM(S): at 20:33

## 2023-01-02 RX ADMIN — Medication 40 MILLIEQUIVALENT(S): at 17:55

## 2023-01-02 NOTE — DIETITIAN INITIAL EVALUATION ADULT - OTHER INFO
81 y/o F with PMHx of DM, Moderate AS, Hx of COVID, with recent multiple admissions for COVID/PNA/UTI in early Nov, readmitted in late Nov for UTI/left forearm lacerations (self inflicted) and agitation, presents to  ED for aggressive behavior. Per daughter prior to COVID, pt was living independently. Since the admission in early November, patient has had progressively decline in her health and has been agitated. On last admission, pt was was found cutting herself in front of her grandchildren. Patient was discharged home and was living with daughter. Today, patient thought that there was something wrong with her phone and started screaming. Family tried to reorient and calm the patient but was unsuccessful. Patient tried to leave the home. Daughter feels that her mother continues to decline mentally. She has been refusing her medications and feels it won't be safe for the patient to stay home if she's not medically optimized.  Admit for Severe Agitation, ?Dementia with psychosis     SW consult for placement into NH, family unable to take care of the patient 2/2 declining medically, dementia w/ aggression is getting worse. RD observed break fast tray and pt consumed ~50% (pancakes, eggs, rawls). Unable to obtain diet hx 2/2 dementia; pt reports UBW of 120# x ? time frame ; bed scale wt of 111# taken by RD on 1/2/22. Unintentional wt loss of 9# x ? time frame, unable to confirm significant wt loss.     81 y/o F with PMHx of DM, Moderate AS, Hx of COVID, with recent multiple admissions for COVID/PNA/UTI in early Nov, readmitted in late Nov for UTI/left forearm lacerations (self inflicted) and agitation, presents to  ED for aggressive behavior. Per daughter prior to COVID, pt was living independently. Since the admission in early November, patient has had progressively decline in her health and has been agitated. On last admission, pt was was found cutting herself in front of her grandchildren. Patient was discharged home and was living with daughter. Today, patient thought that there was something wrong with her phone and started screaming. Family tried to reorient and calm the patient but was unsuccessful. Patient tried to leave the home. Daughter feels that her mother continues to decline mentally. She has been refusing her medications and feels it won't be safe for the patient to stay home if she's not medically optimized.  Admit for Severe Agitation, ?Dementia with psychosis     SW consult for placement into NH, family unable to take care of the patient 2/2 declining medically, dementia w/ aggression is getting worse. RD observed break fast tray and pt consumed ~50% (pancakes, eggs, rawls). Unable to obtain diet hx 2/2 dementia; pt reports UBW of 120# x ? time frame ; bed scale wt of 111# taken by RD on 1/2/22. Unintentional wt loss of 9# x ? time frame, unable to confirm significant wt loss. NFPE reveals severe muscle/ fat wasting - pt appears thin, frail, and lanie. Liberalize diet to regular to maximize caloric and nutrient intake. Add ensure + HP shake TID - pt is receptive. See below for other recommendations.

## 2023-01-02 NOTE — BH CONSULTATION LIAISON ASSESSMENT NOTE - SUMMARY
83 y/o  female, with 1 adult daughter, lives alone in [place] in an apartment, with no formal PPHx, no h/o SA/SIB, recent admission to  on 11/11 for COVID, UTI, discharged on 11/23, noted to have episodes of agitation during that admission, with no pertinent PMHx presents to the ED c/o multiple lacerations to her left forearm. Pt not sure how she got the lacerations.  Psychiatry consulted to assess for medication management.    Seen and evaluated, awake and alert, confused about date and event leading up to hospitalization, poor historian. Denies active SI/HI, AVH, or thoughts of paranoia.  Per daughter, patient with memory impairments in the last few years (short term), states however recently hospitalized for COVID 2 weeks ago and was discharged home on 11/23. States patient had recent episodes of agitation while in the hospital for which she was discharged home with Seroquel which family has been trying to give her.  She denies patient with any previous PPHx or h/o SA or SIB, they deny patient has ever seen a neurologist.  Psych CL to follow patient, can use Seroquel 25mg PO Routine BID and timed at 1600, PRN Seroquel also ordered.   Complex Repair And Rotation Flap Text: The defect edges were debeveled with a #15 scalpel blade.  The primary defect was closed partially with a complex linear closure.  Given the location of the remaining defect, shape of the defect and the proximity to free margins a rotation flap was deemed most appropriate for complete closure of the defect.  Using a sterile surgical marker, an appropriate advancement flap was drawn incorporating the defect and placing the expected incisions within the relaxed skin tension lines where possible.    The area thus outlined was incised deep to adipose tissue with a #15 scalpel blade.  The skin margins were undermined to an appropriate distance in all directions utilizing iris scissors.

## 2023-01-02 NOTE — DIETITIAN INITIAL EVALUATION ADULT - NSFNSPHYEXAMSKINFT_GEN_A_CORE
Pressure Injury 1: sacrum, Stage II  Pressure Injury 2: none, none  Pressure Injury 3: none, none  Pressure Injury 4: none, none  Pressure Injury 5: none, none  Pressure Injury 6: none, none  Pressure Injury 7: none, none  Pressure Injury 8: none, none  Pressure Injury 9: none, none  Pressure Injury 10: none, none  Pressure Injury 11: none, none Ted = 18  Pressure Injury 1: sacrum, Stage II

## 2023-01-02 NOTE — BH CONSULTATION LIAISON ASSESSMENT NOTE - NSBHCONSULTRECOMMENDOTHER_PSY_A_CORE FT
-Increase dose Seroquel 25mg to TID (monitor qtc <500ms on ekg)   -PRN: Haldol 1mg PO/IM q8h PRN agitation/severe agitation.  Monitor for QTc<500.  Melatonin 3mg PO qHS PRN insomnia.  -Minimize use of benzos, opioids, anticholinergics, or other deliriogenic agents when possible. Maintain sleep wake cycle. Provide frequent reorientation and redirection.  Family member at bedside if possible. Assess for need for glasses and hearing aid (if applicable).  -CL Psych will follow.

## 2023-01-02 NOTE — BH CONSULTATION LIAISON ASSESSMENT NOTE - NSBHCHARTREVIEWINVESTIGATE_PSY_A_CORE FT
Ventricular Rate 68 BPM    Atrial Rate 68 BPM    P-R Interval 196 ms    QRS Duration 70 ms    Q-T Interval 406 ms    QTC Calculation(Bazett) 431 ms    P Axis 60 degrees    R Axis 4 degrees    T Axis 36 degrees    Diagnosis Line Normal sinus rhythm  RSR' or QR pattern in V1 suggests right ventricular conduction delay  Borderline ECG  When compared with ECG of 02-DEC-2022 23:53,  No significant change was found  Confirmed by Anu Fuentes ([id number]) on 12/31/2022 12:29:51 PM

## 2023-01-02 NOTE — DIETITIAN INITIAL EVALUATION ADULT - ADD RECOMMEND
1) Liberalize diet to regular to maximize caloric and nutrient intake.  2) Add ensure + HP shake TID  3) Monitor bowel movements, if no BM for >3 days, consider implementing bowel regimen.  4) Recommend to add MVI w/minerals, Vit C 500 mg BID, add Zinc Sulfate 220 mg x 10 days to promote wound healing.  5) Consider adding thiamine 100 mg daily 2/2 poor PO intake/ malnutrition  6) Meal encouragement; tray set-up to increase po intake  7) Encourage protein-rich foods, maximize food preferences  8) Confirm goals of care regarding nutrition support  RD will continue to monitor PO intake, labs, hydration, and wt prn.

## 2023-01-02 NOTE — BH CONSULTATION LIAISON ASSESSMENT NOTE - NSBHCHARTREVIEWVS_PSY_A_CORE FT
Vital Signs Last 24 Hrs  T(C): 36.8 (02 Jan 2023 08:56), Max: 37.1 (01 Jan 2023 22:29)  T(F): 98.3 (02 Jan 2023 08:56), Max: 98.8 (01 Jan 2023 22:29)  HR: 84 (02 Jan 2023 08:56) (66 - 84)  BP: 147/59 (02 Jan 2023 08:56) (123/62 - 147/59)  BP(mean): --  RR: 18 (02 Jan 2023 08:56) (18 - 18)  SpO2: 97% (02 Jan 2023 08:56) (96% - 99%)    Parameters below as of 02 Jan 2023 08:56  Patient On (Oxygen Delivery Method): room air

## 2023-01-02 NOTE — DIETITIAN INITIAL EVALUATION ADULT - ORAL INTAKE PTA/DIET HISTORY
Pt lives at home w/ daughter as per H&P. Reports good appetite - ? accuracy 2/2 w/ dementia and psychosis, unable to provide 24-hr recall at this time. Likely consuming <75% of ENN.

## 2023-01-02 NOTE — DIETITIAN INITIAL EVALUATION ADULT - PERTINENT LABORATORY DATA
01-01    143  |  113<H>  |  11  ----------------------------<  109<H>  3.3<L>   |  22  |  0.78    Ca    8.5      01 Jan 2023 09:22    POCT Blood Glucose.: 125 mg/dL (01-02-23 @ 07:43)  A1C with Estimated Average Glucose Result: 7.9 % (11-26-22 @ 12:40)

## 2023-01-02 NOTE — DIETITIAN INITIAL EVALUATION ADULT - NSICDXPASTMEDICALHX_GEN_ALL_CORE_FT
PAST MEDICAL HISTORY:  2019 novel coronavirus disease (COVID-19)     DM (diabetes mellitus)     E. coli UTI (urinary tract infection)     Hypothyroid     Moderate aortic stenosis

## 2023-01-02 NOTE — BH CONSULTATION LIAISON ASSESSMENT NOTE - HPI (INCLUDE ILLNESS QUALITY, SEVERITY, DURATION, TIMING, CONTEXT, MODIFYING FACTORS, ASSOCIATED SIGNS AND SYMPTOMS)
83 y/o  female, with 1 adult daughter, lives alone in [place] in an apartment, with no formal PPHx, no h/o SA/SIB, recent admission to  on 11/11 for COVID, UTI, discharged on 11/23, noted to have episodes of agitation during that admission, with no pertinent PMHx presents to the ED c/o multiple lacerations to her left forearm. Pt not sure how she got the lacerations. Psychiatry consulted to assess for management of depression s/p self injurious behaviors.    Seen and evaluated, patient is lethargic. States she is currently in the Hospital. Reports she is sad because he daughter "doesn't want her" anymore. Patient reports she normally lives in [place] by herself, but reports she has been staying with her daughter in [place] for the holidays.  Patient denies previous PPHx, denies that she has been depressed, feeling sad or anxious.  She denies use of alcohol or drugs.  She reports she has been eating and sleeping well.  Denies having any acute complaints.  Patient pleasantly confused.    Collateral from previous admission, with same presentation: Reports patient memory impairments for the last few years, states she is with short term memory impairments, can't remember certain conversations or events that have recently happened, states can't remember that her grand daughter has been applying for colleges.  She reports at times patient leaves the house to go on walks but hasn't gotten lost.  She reports patient recently in hospital for COVID and was just d/c'ed on 11/23, however during that hospital stay, patient had episodes of agitation, having security called on her, requiring prns of Haldol and Seroquel, reports worsened during medication administration times, states patient "doesn't believe in medications."  She reports last night and into this morning, patient increasingly more agitated, screaming and yelling at home after family tried to give her medications (currently still finishing steroid taper).  She reports patient took nail scissors from her cosmetic bag and began to cut her forearm in front of her grand daughter and once family intervened took out tweezers to try to cut herself to which family called 911.  Daughter reports patient this morning reports to her grand daughter about "wanting to die", but denies patient had ever previously made these statements or had any previous h/o SA or SIB.  She reports once iris arrived to the hospital patient did not recall the events of what occurred this morning.  She reports patient prior to her medical admission this month for COVID was living by herself in an apartment in [place], but reports was part of a CDPEP program and was assigned "a helping person" to come assist her in her apartment M- for a few hours each day.  She reports also seeing patient regularly in her apartment in [place].  Patient does her own cooking, cleaning and shopping at baseline.  She denies patient has ever had any previous PPHx, denies patient uses any illicit substances or alcohol.

## 2023-01-02 NOTE — BH CONSULTATION LIAISON ASSESSMENT NOTE - NSBHCHARTREVIEWLAB_PSY_A_CORE FT
01-01    143  |  113<H>  |  11  ----------------------------<  109<H>  3.3<L>   |  22  |  0.78    Ca    8.5      01 Jan 2023 09:22

## 2023-01-02 NOTE — BH CONSULTATION LIAISON ASSESSMENT NOTE - CURRENT MEDICATION
MEDICATIONS  (STANDING):  dextrose 5%. 1000 milliLiter(s) (50 mL/Hr) IV Continuous <Continuous>  dextrose 5%. 1000 milliLiter(s) (100 mL/Hr) IV Continuous <Continuous>  dextrose 50% Injectable 25 Gram(s) IV Push once  dextrose 50% Injectable 12.5 Gram(s) IV Push once  dextrose 50% Injectable 25 Gram(s) IV Push once  enoxaparin Injectable 40 milliGRAM(s) SubCutaneous every 24 hours  glucagon  Injectable 1 milliGRAM(s) IntraMuscular once  insulin lispro (ADMELOG) corrective regimen sliding scale   SubCutaneous three times a day before meals  insulin lispro (ADMELOG) corrective regimen sliding scale   SubCutaneous at bedtime  potassium chloride    Tablet ER 40 milliEquivalent(s) Oral once  QUEtiapine 25 milliGRAM(s) Oral daily  QUEtiapine 25 milliGRAM(s) Oral two times a day    MEDICATIONS  (PRN):  acetaminophen     Tablet .. 650 milliGRAM(s) Oral every 6 hours PRN Temp greater or equal to 38C (100.4F), Mild Pain (1 - 3)  dextrose Oral Gel 15 Gram(s) Oral once PRN Blood Glucose LESS THAN 70 milliGRAM(s)/deciliter  melatonin 5 milliGRAM(s) Oral at bedtime PRN Insomnia  QUEtiapine 25 milliGRAM(s) Oral every 6 hours PRN moderate psychotic  agitation

## 2023-01-02 NOTE — PROGRESS NOTE ADULT - SUBJECTIVE AND OBJECTIVE BOX
Reason for Admission: agitation  History of Present Illness:   81 y/o F with PMHx of DM, Moderate AS, Hx of COVID, with recent multiple admissions for COVID/PNA/UTI in early Nov, readmitted in late Nov for UTI/left forearm lacerations (self inflicted) and agitation, presents to  ED for aggressive behavior. Per daughter prior to COVID, pt was living independently. Since the admission in early November, patient has had progressively decline in her health and has been agitated. On last admission, pt was was found cutting herself in front of her grandchildren. Patient was discharged home and was living with daughter.   Today, patient thought that there was something wrong with her phone and started screaming. Family tried to reorient and calm the patient but was unsuccessful. Patient tried to leave the home. Family and neighbours called 911 and patient was brought to  hospital. Daughter feels that her mother continues to decline mentally. She has been refusing her medications and feels it won't be safe for the patient to stay home if she's not medically optimized. Patient has no acute complaints at this time. Denies urinary symptoms. AAOx3 (place, person and time) but not to situation.     12/31 - agitated overnight requiring 1:1 and code grey.  1/1 - agitated at times requiring 1:1.  tearful this AM wanting to speak with daughter   1/2 - pt now more calm and off 1:1    ROS:   All 10 systems reviewed and found to be negative with the exception of what has been described above.    Vital Signs Last 24 Hrs  T(C): 36.8 (02 Jan 2023 08:56), Max: 37.1 (01 Jan 2023 22:29)  T(F): 98.3 (02 Jan 2023 08:56), Max: 98.8 (01 Jan 2023 22:29)  HR: 84 (02 Jan 2023 08:56) (66 - 84)  BP: 147/59 (02 Jan 2023 08:56) (124/62 - 147/59)  BP(mean): --  RR: 18 (02 Jan 2023 08:56) (18 - 18)  SpO2: 97% (02 Jan 2023 08:56) (96% - 97%)    Parameters below as of 02 Jan 2023 08:56  Patient On (Oxygen Delivery Method): room air        GEN: lying in bed, NAD  HEENT:   NC/AT, pupils equal and reactive, EOMI  CV:  +S1, +S2, RRR  RESP:   lungs clear to auscultation bilaterally, no wheeze, rales, rhonchi   BREAST:  not examined  GI:  abdomen soft, non-tender, non-distended, normoactive BS  RECTAL:  not examined  :  not examined  MSK:   normal muscle tone  EXT:  no edema  NEURO:  AAOX3, no focal neurological deficits  SKIN:  no rashes              01-01    143  |  113<H>  |  11  ----------------------------<  109<H>  3.3<L>   |  22  |  0.78    Ca    8.5      01 Jan 2023 09:22          Radiology:   X-Ray, Fluoroscopy:    29-Dec-2022 23:07, Xray Chest 1 View- PORTABLE-Urgent  PACS Image: Image(s) Available  Xray Chest 1 View- PORTABLE-Urgent:   	ACC: [id number] EXAM:  XR CHEST PORTABLE URGENT 1V                        	  	PROCEDURE DATE:  12/29/2022    	  	  	  	INTERPRETATION:  AP chest on December 29, 2022 at 10:57 PM. Patient has   	altered mental status.  	  	Heart size normal.  	  	On November 28 there is interstitial infiltration concentrated in the   	right lung greater on the left and more pronounced in the inferior lung.  	  	On present examination these infiltrates are again noted but there is   	significant improvement.  	  	IMPRESSION: Significant improvement in interstitial infiltration. Mild   	residual.  	  	--- End of Report ---     Assessment:  · Assessment	  81 y/o F with PMHx of DM, Moderate AS, Hx of COVID, with recent multiple admissions for COVID/PNA/UTI in early Nov, readmitted in late Nov for UTI/left forearm lacerations (self inflicted) and agitation, presents to  ED for aggressive behavior    #Severe Agitation, ?Dementia with psychosis   -Seroquel 25mg tid  -Psych consult noted and agree with med adjustments  -CO off now. d/w psych   -pt with Hx of hypothyroid in the past was on Synthroid *(not taken in years, per daughter), previously had elevated TSH will check t3/t4 which were normal.  hold off synthroid   - consult for placement. Family unable to take care of the patient     #Hx severe Covid with suspected pulmonary micro thrombi  -was prescribed Xarelto 30 day pack which was discontinued due to epistaxis     #DM   -A1c: 7.9 (11/26/22)   -per daughter, pt had bloodwork done this week as outpatient and currently not on any medications. Was previously on Metformin   -ISS     #Hx of recurrent UTIs  -UA: reviewed   -s/p 1x Rocephin in ED  -pt w/o urinary symptoms   -f/u UCx - contaminated.  will resend on 1/1  -will hold off on abx at this time     #DVT prophylaxis   -Lovenox     #Dispo   -awaiting placement     Discussed with daughter over the phone and she wants her mom place din adult home or assisted living.  SW to call her tomorrow

## 2023-01-02 NOTE — DIETITIAN INITIAL EVALUATION ADULT - PERTINENT MEDS FT
MEDICATIONS  (STANDING):  dextrose 5%. 1000 milliLiter(s) (50 mL/Hr) IV Continuous <Continuous>  dextrose 5%. 1000 milliLiter(s) (100 mL/Hr) IV Continuous <Continuous>  dextrose 50% Injectable 25 Gram(s) IV Push once  dextrose 50% Injectable 12.5 Gram(s) IV Push once  dextrose 50% Injectable 25 Gram(s) IV Push once  enoxaparin Injectable 40 milliGRAM(s) SubCutaneous every 24 hours  glucagon  Injectable 1 milliGRAM(s) IntraMuscular once  insulin lispro (ADMELOG) corrective regimen sliding scale   SubCutaneous three times a day before meals  insulin lispro (ADMELOG) corrective regimen sliding scale   SubCutaneous at bedtime  potassium chloride    Tablet ER 40 milliEquivalent(s) Oral once  QUEtiapine 25 milliGRAM(s) Oral two times a day    MEDICATIONS  (PRN):  acetaminophen     Tablet .. 650 milliGRAM(s) Oral every 6 hours PRN Temp greater or equal to 38C (100.4F), Mild Pain (1 - 3)  dextrose Oral Gel 15 Gram(s) Oral once PRN Blood Glucose LESS THAN 70 milliGRAM(s)/deciliter  melatonin 5 milliGRAM(s) Oral at bedtime PRN Insomnia  QUEtiapine 25 milliGRAM(s) Oral every 6 hours PRN moderate psychotic  agitation

## 2023-01-02 NOTE — BH CONSULTATION LIAISON ASSESSMENT NOTE - NSBHCONSULTFOLLOWAFTERCARE_PSY_A_CORE FT
COLONOSCOPY: GOLYTELY PREP     Re: Kasie Tolentino   [address] [facility] [id number]    Provider: Zaira Freed MD    Your colon must be cleaned of stool to have a good view. You should follow these directions to have a successful colonoscopy.     Your exam is scheduled as an outpatient procedure on:     Day: Wednesday    Date: JULY 6 2022     Arrival Time: 7:00 AM (This time is subject to change; you will receive a message 3 days before with finalized arrival time. If you DO NOT receive a message or have questions, contact [phone] or visit the patient portal for details.)    Location: [facility], [address] [place] [address]. [place] [address] - Directions: Come all the way into the main lobby of the building and take the interior elevator down to the lower level. Turn left off the elevator and walk straight ahead to the Endoscopy reception area.     You will need the following in order to complete your prep:   1. Golytely/Nulytely  2. Two Simethicone tablets (OVER THE COUNTER)  3. Two Dulcolax (Bisacodyl) tablets (OVER THE COUNTER)    Your prep kit has been sent to   [place] [address] [place] [address]  [address] [facility] [address]  Phone: [phone] Fax: [phone]  . Please  the kit today. If not picked up within 7 days contact your pharmacy directly as prescription would been placed back and re-stock.        If you are diabetic: taking oral: Take all oral meds the day before the procedure. HOLD oral meds the day of the procedure.     Your COVID test has been order at:    [address] [address];  You will need to call  [phone] from the parking lot to check in when you arrive for your COVID test.  Appointment: Sunday 07/03/2022  at  930 am  .    You will be completing your COVID test 48-72 hours before the procedure depending on location. Once you complete your COVID test you will be encouraged to  self-quarantine to minimize risk of exposure, but if you can't, we recommend you wear a mask, social distance and practice good hand-washing hygiene prior to your procedure.    7 days before colonoscopy: Review your colonoscopy instructions. (Instructions can also be found on [employer] under the letters tab under communication)  • Stop eating popcorn, nuts, flax/sesame seeds, or any food that contains seeds    3 BUSINESS DAYS BEFORE your procedure:  • Stop taking all anti-inflammatory medicines. These include Advil, Aleve, Naprosyn, (Tylenol is okay to take)  1 DAY BEFORE the procedure:   •Start a strict, clear liquid diet from the moment you wake. A clear liquid diet can include: Apple juice, white grape juice, and white cranberry juice; Beef or chicken broths that are clear – no noodles, vegetables, rice, etc.Tea and coffee without milk; -Soda pop, Gatorade, Seven-Aid and various -Jell-O Flavors (any color except red or purple)  •Avoid: juices with pulp, milk, cream, solid food, alcohol, Gum, and hard candy.    • In the morning when you wake, follow the directions on the prep packaging to mix your prep.  Fill the gallon jug half full with lukewarm tap water, shake vigorously. Continue to fill the jug with lukewarm water until full. Shake until the powder is completely dissolved. Chill in refrigerator before drinking. You may add Crystal Light powder to flavor the prep.     Drink half of the gallon preparation at 4:00 p.m, drinking 8 ounces of solution every 10 minutes. Also chew one Simethicone tablet with the first glass of solution. Place remaining solution in the refrigerator.     Continue to drink clear liquids throughout the evening but do not eat any solid food.    6 hours prior to arrival time:  Drink 8 ounces of solution every 10 minutes until complete.  Take one Dulcolax (Bisacodyl) tablet with each of the last two glasses of the solution. Chew the remaining Simethicone tablet. For a successful prep, you  must drink the entire solution.     4 hours prior to your arrival time, STOP ALL LIQUIDS INCLUDING WATER AT THIS TIME.    • Take your medications;  ( TAKE IF TAKEN IN THE MORNING)    with a sip of water 4 hours prior to your arrival time. STOP ALL LIQUIDS INCLUDING WATER AT THIS TIME.    If you are still having solid/formed stools or trouble completing this preparation, please call the doctor's office at [phone].   Would benefit for RADHA and more supervision

## 2023-01-03 VITALS
TEMPERATURE: 99 F | RESPIRATION RATE: 18 BRPM | SYSTOLIC BLOOD PRESSURE: 126 MMHG | HEART RATE: 72 BPM | DIASTOLIC BLOOD PRESSURE: 67 MMHG | OXYGEN SATURATION: 99 %

## 2023-01-03 LAB
GLUCOSE BLDC GLUCOMTR-MCNC: 150 MG/DL — HIGH (ref 70–99)
GLUCOSE BLDC GLUCOMTR-MCNC: 92 MG/DL — SIGNIFICANT CHANGE UP (ref 70–99)
SARS-COV-2 RNA SPEC QL NAA+PROBE: SIGNIFICANT CHANGE UP

## 2023-01-03 PROCEDURE — 99239 HOSP IP/OBS DSCHRG MGMT >30: CPT

## 2023-01-03 RX ORDER — QUETIAPINE FUMARATE 200 MG/1
1 TABLET, FILM COATED ORAL
Qty: 0 | Refills: 0 | DISCHARGE
Start: 2023-01-03

## 2023-01-03 RX ORDER — LANOLIN ALCOHOL/MO/W.PET/CERES
1 CREAM (GRAM) TOPICAL
Qty: 0 | Refills: 0 | DISCHARGE
Start: 2023-01-03

## 2023-01-03 RX ORDER — METFORMIN HYDROCHLORIDE 850 MG/1
1 TABLET ORAL
Qty: 0 | Refills: 0 | DISCHARGE

## 2023-01-03 RX ADMIN — QUETIAPINE FUMARATE 25 MILLIGRAM(S): 200 TABLET, FILM COATED ORAL at 09:30

## 2023-01-03 RX ADMIN — ENOXAPARIN SODIUM 40 MILLIGRAM(S): 100 INJECTION SUBCUTANEOUS at 09:30

## 2023-01-03 RX ADMIN — QUETIAPINE FUMARATE 25 MILLIGRAM(S): 200 TABLET, FILM COATED ORAL at 16:30

## 2023-01-03 NOTE — PROGRESS NOTE ADULT - SUBJECTIVE AND OBJECTIVE BOX
Reason for Admission: agitation  History of Present Illness:   83 y/o F with PMHx of DM, Moderate AS, Hx of COVID, with recent multiple admissions for COVID/PNA/UTI in early Nov, readmitted in late Nov for UTI/left forearm lacerations (self inflicted) and agitation, presents to  ED for aggressive behavior. Per daughter prior to COVID, pt was living independently. Since the admission in early November, patient has had progressively decline in her health and has been agitated. On last admission, pt was was found cutting herself in front of her grandchildren. Patient was discharged home and was living with daughter.   Today, patient thought that there was something wrong with her phone and started screaming. Family tried to reorient and calm the patient but was unsuccessful. Patient tried to leave the home. Family and neighbours called 911 and patient was brought to  hospital. Daughter feels that her mother continues to decline mentally. She has been refusing her medications and feels it won't be safe for the patient to stay home if she's not medically optimized. Patient has no acute complaints at this time. Denies urinary symptoms. AAOx3 (place, person and time) but not to situation.     12/31 - agitated overnight requiring 1:1 and code grey.  1/1 - agitated at times requiring 1:1.  tearful this AM wanting to speak with daughter   1/2 - pt now more calm and off 1:1  1/3 - pt remains off 1:1.      ROS:   All 10 systems reviewed and found to be negative with the exception of what has been described above.    Vital Signs Last 24 Hrs  T(C): 36.8 (03 Jan 2023 07:38), Max: 36.8 (02 Jan 2023 20:00)  T(F): 98.2 (03 Jan 2023 07:38), Max: 98.2 (02 Jan 2023 20:00)  HR: 63 (03 Jan 2023 07:38) (63 - 65)  BP: 124/65 (03 Jan 2023 07:38) (124/65 - 136/70)  BP(mean): --  RR: 18 (03 Jan 2023 07:38) (18 - 18)  SpO2: 98% (03 Jan 2023 07:38) (98% - 100%)    Parameters below as of 03 Jan 2023 07:38  Patient On (Oxygen Delivery Method): room air        GEN: lying in bed, NAD  HEENT:   NC/AT, pupils equal and reactive, EOMI  CV:  +S1, +S2, RRR  RESP:   lungs clear to auscultation bilaterally, no wheeze, rales, rhonchi   BREAST:  not examined  GI:  abdomen soft, non-tender, non-distended, normoactive BS  RECTAL:  not examined  :  not examined  MSK:   normal muscle tone  EXT:  no edema  NEURO:  AAOX3, no focal neurological deficits  SKIN:  no rashes          Radiology:   X-Ray, Fluoroscopy:    29-Dec-2022 23:07, Xray Chest 1 View- PORTABLE-Urgent  PACS Image: Image(s) Available  Xray Chest 1 View- PORTABLE-Urgent:   	ACC: [id number] EXAM:  XR CHEST PORTABLE URGENT 1V                        	  	PROCEDURE DATE:  12/29/2022    	  	  	  	INTERPRETATION:  AP chest on December 29, 2022 at 10:57 PM. Patient has   	altered mental status.  	  	Heart size normal.  	  	On November 28 there is interstitial infiltration concentrated in the   	right lung greater on the left and more pronounced in the inferior lung.  	  	On present examination these infiltrates are again noted but there is   	significant improvement.  	  	IMPRESSION: Significant improvement in interstitial infiltration. Mild   	residual.  	  	--- End of Report ---     Assessment:  · Assessment	  83 y/o F with PMHx of DM, Moderate AS, Hx of COVID, with recent multiple admissions for COVID/PNA/UTI in early Nov, readmitted in late Nov for UTI/left forearm lacerations (self inflicted) and agitation, presents to  ED for aggressive behavior    #Severe Agitation, ?Dementia with psychosis   -Seroquel 25mg tid  -Psych consult noted and agree with med adjustments  -CO off now. d/w psych   -pt with Hx of hypothyroid in the past was on Synthroid *(not taken in years, per daughter), previously had elevated TSH will check t3/t4 which were normal.  hold off synthroid   -SW consult for placement. Family unable to take care of the patient     #Hx severe Covid with suspected pulmonary micro thrombi  -was prescribed Xarelto 30 day pack which was discontinued due to epistaxis     #DM   -A1c: 7.9 (11/26/22)   -per daughter, pt had bloodwork done this week as outpatient and currently not on any medications. Was previously on Metformin   -ISS     #Hx of recurrent UTIs  -UA: reviewed   -s/p 1x Rocephin in ED  -pt w/o urinary symptoms   -f/u UCx - contaminated.  will resend on 1/1  -will hold off on abx at this time     #DVT prophylaxis   -Lovenox     #Dispo   -awaiting placement     Discussed with daughter over the phone and she wants her mom place din adult home or assisted living.  SW to call her today

## 2023-01-03 NOTE — DISCHARGE NOTE NURSING/CASE MANAGEMENT/SOCIAL WORK - PATIENT PORTAL LINK FT
You can access the FollowMyHealth Patient Portal offered by [facility] by registering at the following website: [email or web address]. By joining [facility]’s FollowMyHealth portal, you will also be able to view your health information using other applications (apps) compatible with our system.

## 2023-01-03 NOTE — DISCHARGE NOTE PROVIDER - NSDCMRMEDTOKEN_GEN_ALL_CORE_FT
melatonin 5 mg oral tablet: 1 tab(s) orally once a day (at bedtime), As needed, Insomnia  metFORMIN 500 mg oral tablet, extended release: 1 tab(s) orally once a day  QUEtiapine 25 mg oral tablet: 1 tab(s) orally every 6 hours, As needed, moderate psychotic  agitation  QUEtiapine 25 mg oral tablet: 1 tab(s) orally 3 times a day

## 2023-01-03 NOTE — DISCHARGE NOTE PROVIDER - HOSPITAL COURSE
83 y/o F with PMHx of DM, Moderate AS, Hx of COVID, with recent multiple admissions for COVID/PNA/UTI in early Nov, readmitted in late Nov for UTI/left forearm lacerations (self inflicted) and agitation, presents to  ED for aggressive behavior. Per daughter prior to COVID, pt was living independently. Since the admission in early November, patient has had progressively decline in her health and has been agitated. On last admission, pt was was found cutting herself in front of her grandchildren. Patient was discharged home and was living with daughter.   Today, patient thought that there was something wrong with her phone and started screaming. Family tried to reorient and calm the patient but was unsuccessful. Patient tried to leave the home. Family and neighbours called 911 and patient was brought to  hospital. Daughter feels that her mother continues to decline mentally. She has been refusing her medications and feels it won't be safe for the patient to stay home if she's not medically optimized. Patient has no acute complaints at this time. Denies urinary symptoms. AAOx3 (place, person and time) but not to situation.   pt admitted and required 1:1 but has been off now for two days and is calm cooperative.  pt seen by  and seroquel increased to tid dosing.      Vital Signs Last 24 Hrs  T(C): 36.8 (03 Jan 2023 07:38), Max: 36.8 (02 Jan 2023 20:00)  T(F): 98.2 (03 Jan 2023 07:38), Max: 98.2 (02 Jan 2023 20:00)  HR: 63 (03 Jan 2023 07:38) (63 - 65)  BP: 124/65 (03 Jan 2023 07:38) (124/65 - 136/70)  BP(mean): --  RR: 18 (03 Jan 2023 07:38) (18 - 18)  SpO2: 98% (03 Jan 2023 07:38) (98% - 100%)    Parameters below as of 03 Jan 2023 07:38  Patient On (Oxygen Delivery Method): room air    GEN: lying in bed, NAD  HEENT:   NC/AT, pupils equal and reactive, EOMI  CV:  +S1, +S2, RRR  RESP:   lungs clear to auscultation bilaterally, no wheeze, rales, rhonchi   BREAST:  not examined  GI:  abdomen soft, non-tender, non-distended, normoactive BS  RECTAL:  not examined  :  not examined  MSK:   normal muscle tone  EXT:  no edema  NEURO:  AAOX3, no focal neurological deficits  SKIN:  no rashes      Radiology:   X-Ray, Fluoroscopy:    29-Dec-2022 23:07, Xray Chest 1 View- PORTABLE-Urgent  PACS Image: Image(s) Available  Xray Chest 1 View- PORTABLE-Urgent:   ACC: [id number] EXAM:  XR CHEST PORTABLE URGENT 1V                        	  	PROCEDURE DATE:  12/29/2022    	IMPRESSION: Significant improvement in interstitial infiltration. Mild   	residual.  	  	--- End of Report ---     Assessment:  · Assessment	  83 y/o F with PMHx of DM, Moderate AS, Hx of COVID, with recent multiple admissions for COVID/PNA/UTI in early Nov, readmitted in late Nov for UTI/left forearm lacerations (self inflicted) and agitation, presents to  ED for aggressive behavior    #Severe Agitation, ?Dementia with psychosis   -Seroquel 25mg tid (increased from bid on this admission as per psych recs)  -Psych consult noted and agree with med adjustments  -pt with Hx of hypothyroid in the past was on Synthroid *(not taken in years, per daughter), previously had elevated TSH will check t3/t4 which were normal.  hold off synthroid      #Hx severe Covid with suspected pulmonary micro thrombi  -was prescribed Xarelto 30 day pack which was discontinued due to epistaxis     #DM   - resume metformin upon dc   -A1c: 7.9 (11/26/22)     #Hx of recurrent UTIs  -UA: reviewed   -s/p 1x Rocephin in ED  -pt w/o urinary symptoms   -f/u UCx - contaminated.  unable to resend   -will hold off on abx at this time as pt afebrile and non toxic appearin g    Discussed with daughter Sofie over the phone and she wants her mom placed.      time spent 40 mins

## 2023-01-03 NOTE — DISCHARGE NOTE PROVIDER - NSDCCPCAREPLAN_GEN_ALL_CORE_FT
PRINCIPAL DISCHARGE DIAGNOSIS  Diagnosis: Agitation  Assessment and Plan of Treatment: continue seroquel  - SNF placement      SECONDARY DISCHARGE DIAGNOSES  Diagnosis: Dementia  Assessment and Plan of Treatment:

## 2023-01-03 NOTE — PROGRESS NOTE ADULT - NUTRITIONAL ASSESSMENT
This patient has been assessed with a concern for Malnutrition and has been determined to have a diagnosis/diagnoses of Severe protein-calorie malnutrition and Underweight (BMI < 19).    This patient is being managed with:   Diet DASH/TLC-  Sodium & Cholesterol Restricted  Entered: Dec 30 2022  7:10PM    
This patient has been assessed with a concern for Malnutrition and has been determined to have a diagnosis/diagnoses of Severe protein-calorie malnutrition and Underweight (BMI < 19).    This patient is being managed with:   Diet DASH/TLC-  Sodium & Cholesterol Restricted  Entered: Dec 30 2022  7:10PM

## 2023-01-08 DIAGNOSIS — Z20.822 CONTACT WITH AND (SUSPECTED) EXPOSURE TO COVID-19: ICD-10-CM

## 2023-01-08 DIAGNOSIS — F03.911 UNSPECIFIED DEMENTIA, UNSPECIFIED SEVERITY, WITH AGITATION: ICD-10-CM

## 2023-01-08 DIAGNOSIS — Z91.14 PATIENT'S OTHER NONCOMPLIANCE WITH MEDICATION REGIMEN: ICD-10-CM

## 2023-01-08 DIAGNOSIS — E03.9 HYPOTHYROIDISM, UNSPECIFIED: ICD-10-CM

## 2023-01-08 DIAGNOSIS — I35.0 NONRHEUMATIC AORTIC (VALVE) STENOSIS: ICD-10-CM

## 2023-01-08 DIAGNOSIS — F03.92 UNSPECIFIED DEMENTIA, UNSPECIFIED SEVERITY, WITH PSYCHOTIC DISTURBANCE: ICD-10-CM

## 2023-01-08 DIAGNOSIS — Z91.52 PERSONAL HISTORY OF NONSUICIDAL SELF-HARM: ICD-10-CM

## 2023-01-08 DIAGNOSIS — E43 UNSPECIFIED SEVERE PROTEIN-CALORIE MALNUTRITION: ICD-10-CM

## 2023-01-08 DIAGNOSIS — Z86.16 PERSONAL HISTORY OF COVID-19: ICD-10-CM

## 2023-01-08 DIAGNOSIS — Z79.84 LONG TERM (CURRENT) USE OF ORAL HYPOGLYCEMIC DRUGS: ICD-10-CM

## 2023-01-08 DIAGNOSIS — E11.9 TYPE 2 DIABETES MELLITUS WITHOUT COMPLICATIONS: ICD-10-CM

## 2023-09-21 NOTE — BH CONSULTATION LIAISON ASSESSMENT NOTE - VIOLENCE RISK FACTORS:

## 2023-10-13 NOTE — CONSULT NOTE ADULT - SUBJECTIVE AND OBJECTIVE BOX
Patient is a 82y old  Female who presents with a chief complaint of covid    HPI:  83 y/o female recently diagnosed with COVID-19 viral syndrome was admitted on 11/11 for increased generalized weakness, SOB and cough. Pt was diagnosed with COVID 1 week PTA at [facility] and came today after experiencing increased generalized weakness since leaving ED. Pt also reports poor appetite. Not vaccinated for COVID. In ER, the patient was noted hypoxic in to 90%, abnormal CXR, started on Dexa and Remdesivir. Her remdesivir therapy was extended on 11/16 due to persistent SOB and hypoxia.   Also placed on ceftriaxone.       PMH: as above  PSH: as above  Meds: per reconciliation sheet, noted below  MEDICATIONS  (STANDING):  cefTRIAXone Injectable.      cefTRIAXone Injectable. 1000 milliGRAM(s) IV Push every 24 hours  dexAMETHasone  Injectable 6 milliGRAM(s) IV Push daily  enoxaparin Injectable 50 milliGRAM(s) SubCutaneous every 12 hours  guaiFENesin ER 1200 milliGRAM(s) Oral every 12 hours  QUEtiapine 25 milliGRAM(s) Oral at bedtime  remdesivir  IVPB 100 milliGRAM(s) IV Intermittent every 24 hours    MEDICATIONS  (PRN):  acetaminophen     Tablet .. 650 milliGRAM(s) Oral every 6 hours PRN Temp greater or equal to 38C (100.4F), Mild Pain (1 - 3)  albuterol    90 MICROgram(s) HFA Inhaler 2 Puff(s) Inhalation every 6 hours PRN Shortness of Breath and/or Wheezing  aluminum hydroxide/magnesium hydroxide/simethicone Suspension 30 milliLiter(s) Oral every 4 hours PRN Dyspepsia  haloperidol    Injectable 1 milliGRAM(s) IntraMuscular every 6 hours PRN agittion  melatonin 3 milliGRAM(s) Oral at bedtime PRN Insomnia  ondansetron Injectable 4 milliGRAM(s) IV Push every 8 hours PRN Nausea and/or Vomiting    Allergies    No Known Allergies    Intolerances      Social: no smoking, no alcohol, no illegal drugs; no recent travel, no exposure to TB   Pt is Eritrean speaking but translation provided by daughter.  FAMILY HISTORY:  No pertinent family history in first degree relatives      no history of premature cardiovascular disease in first degree relatives    ROS: the patient denies fever, no chills, no HA, no seizures, no dizziness, no sore throat, no nasal congestion, no blurry vision, no CP, no palpitations, has SOB, has cough, no abdominal pain, no diarrhea, no N/V, no dysuria, no leg pain, no claudication, no rash, no joint aches, no rectal pain or bleeding, no night sweats; increased weakness  All other systems reviewed and are negative    Vital Signs Last 24 Hrs  T(C): 36.7 (17 Nov 2022 07:42), Max: 36.7 (16 Nov 2022 16:30)  T(F): 98.1 (17 Nov 2022 07:42), Max: 98.1 (17 Nov 2022 07:42)  HR: 59 (17 Nov 2022 07:42) (50 - 59)  BP: 144/64 (17 Nov 2022 07:42) (124/62 - 166/71)  BP(mean): --  RR: 17 (17 Nov 2022 07:42) (17 - 18)  SpO2: 88% (17 Nov 2022 07:42) (83% - 97%)    Parameters below as of 17 Nov 2022 07:42  Patient On (Oxygen Delivery Method): nasal cannula  O2 Flow (L/min): 4    PE:    Constitutional:  No acute distress  HEENT: NC/AT, EOMI, PERRLA, conjunctivae clear; ears and nose atraumatic; pharynx benign  Neck: supple; thyroid not palpable  Back: no tenderness  Respiratory: respiratory effort normal; crackles b/l  Cardiovascular: S1S2 regular, no murmurs  Abdomen: soft, not tender, not distended, positive BS; no liver or spleen organomegaly  Genitourinary: no suprapubic tenderness  Lymphatic: no LN palpable  Musculoskeletal: no muscle tenderness, no joint swelling or tenderness  Extremities: no pedal edema  Neurological/ Psychiatric: AxOx3, judgement and insight impaired; moving all extremities  Skin: no rashes; no palpable lesions    Labs: all available labs reviewed                        13.3   8.32  )-----------( 223      ( 16 Nov 2022 07:22 )             39.3     11-17    x   |  x   |  x   ----------------------------<  x   x    |  x   |  0.80    Ca    9.0      16 Nov 2022 07:22    TPro  6.9  /  Alb  2.8<L>  /  TBili  0.5  /  DBili  0.1  /  AST  19  /  ALT  24  /  AlkPhos  117  11-17     LIVER FUNCTIONS - ( 17 Nov 2022 07:28 )  Alb: 2.8 g/dL / Pro: 6.9 gm/dL / ALK PHOS: 117 U/L / ALT: 24 U/L / AST: 19 U/L / GGT: x           Culture - Urine (collected 14 Nov 2022 13:20)  Source: Clean Catch Clean Catch (Midstream)  Final Report (16 Nov 2022 18:46):    >100,000 CFU/ml Escherichia coli  Organism: Escherichia coli (16 Nov 2022 18:46)  Organism: Escherichia coli (16 Nov 2022 18:46)      -  Amikacin: S <=16      -  Amoxicillin/Clavulanic Acid: S <=8/4      -  Ampicillin: S <=8 These ampicillin results predict results for amoxicillin      -  Ampicillin/Sulbactam: S <=4/2 Enterobacter, Klebsiella aerogenes, Citrobacter, and Serratia may develop resistance during prolonged therapy (3-4 days)      -  Aztreonam: S <=4      -  Cefazolin: S <=2 For uncomplicated UTI with K. pneumoniae, E. coli, or P. mirablis: MIGUEL <=16 is sensitive and MIGUEL >=32 is resistant. This also predicts results for oral agents cefaclor, cefdinir, cefpodoxime, cefprozil, cefuroxime axetil, cephalexin and locarbef for uncomplicated UTI. Note that some isolates may be susceptible to these agents while testing resistant to cefazolin.      -  Cefepime: S <=2      -  Cefoxitin: S <=8      -  Ceftriaxone: S <=1 Enterobacter, Klebsiella aerogenes, Citrobacter, and Serratia may develop resistance during prolonged therapy      -  Ciprofloxacin: S <=0.25      -  Ertapenem: S <=0.5      -  Gentamicin: S <=2      -  Imipenem: S <=1      -  Levofloxacin: S <=0.5      -  Meropenem: S <=1      -  Nitrofurantoin: S <=32 Should not be used to treat pyelonephritis      -  Piperacillin/Tazobactam: S <=8      -  Tobramycin: S <=2      -  Trimethoprim/Sulfamethoxazole: S <=0.5/9.5      Method Type: MIGUEL    Radiology: all available radiological tests reviewed    < from: CT Angio Chest PE Protocol w/ IV Cont (11.17.22 @ 11:12) >  IMPRESSION: Limited pulmonary embolism study for evaluation of some of   the subsegmental or smaller pulmonary arterial branches due to   respiratory motion artifact. No pulmonary arterial emboli within the well visualized pulmonary arteries.    Widespread bilateral nodular and patchy opacities as described above   representing pneumonia. Considerations includeCovid 19 given the distribution.    1-3 month follow-up noncontrast chest CT is recommended to ensure resolution.    1.1 cm indeterminate partially imaged left renal lesion. Renal ultrasound can be performed for further evaluation.  < end of copied text >      Advanced directives addressed: full resuscitation Name band;

## 2023-12-19 NOTE — ED ADULT NURSE NOTE - NS ED NURSE RECORD ANOTHER VITAL SIGN
[facility] Medicine  Discharge Summary      Patient Name: Radha Sandoval  MRN: [id number]  CINDY: [id number]  Patient Class: IP- Inpatient  Admission Date: 12/16/2023  Hospital Length of Stay: 3 days  Discharge Date and Time:  12/19/2023 6:39 PM  Attending Physician: Kory Smith MD   Discharging Provider: Kory Smith MD  Primary Care Provider: [facility] Medicine Team: [facility] Kory Smith MD  Primary Care Team: [facility]    HPI:   Radha Sandoval is a 86 y.o. female with a PMHx of DVT/PE, chronic bilateral pleural effusions, HTN, severe late term alzheimer's dementia who presents to [facility] for evaluation of altered mental status. Patient is unable to provide any meaningful history due to dementia. Family friend at bedside assists with history. Per chart review, patient noted to be more confused this afternoon by NH staff. She is oriented to person only at baseline. Presentation is similar to when she had a UTI a few months ago. No known recent melena or bloody stools, however they aren't sure since patient lives in a NH. Patient complains of recent right shoulder and face pain but then later denies any recent pain. Denies abdominal or chest pain. Remainder of history limited 2/2 dementia.     ED: hypertensive top /77, vitals otherwise stable. No leukocytosis. Hgb 6.7, baseline ~8. Cr 1.4, slightly worse from baseline ~1.1. CTH/CXR negative for acute findings. Given 1U pRBCs.     * No surgery found *      Hospital Course:   12/17 Hb 8.4 s/p Transfusion with 1 unit of PRBC. K and Mg replaced. NPO. GI Eval . on ampicillin for prior h/o enterococcal UTI. UC pending . per GI, Hb at baseline s/p transfusion. consult GI if evidence of overt bleed . ID eval -Unclear if reported worsened mentation from baseline is secondary to a UTI.Continue Ampicillin   12/18 UC 12/16 No growth. ampicillin discontinued.  K of 2.9, replaced IV. SLP eval today  -  minced/moist ( level 5) and thin liquid. was seen by GI at [facility] 9/18 and advised resumption of eliquis.  Hb stable at 8.4 s/p transfusion . Brown stool on [place] exam. Initial Hgb 6.7 -->8.5 s/p 1u pRBCs. Most recent Hgb stable at 8.9. GI consulted for anemia.  follow up in clinic to consider outpatient endoscopy. Discussed with son POA - Mr. Maykel Sandoval about risks and benefits  of anticoagulation as the patient is bedboud and at risk for DVT/PE .son understands the risks- agrees for resumption of eliquis   12/19 Hb stable on eliquis. discharge to NH today     Goals of Care Treatment Preferences:  Code Status: Full Code    Health care agent: Maykel Sandoval  Health care agent number: [phone]       La[place]: Yes  What is most important right now is to focus on quality of life, even if it means sacrificing a little time, improvement in condition but with limits to invasive therapies.  Accordingly, we have decided that the best plan to meet the patient's goals includes continuing with treatment.      Consults:   Consults (From admission, onward)          Status Ordering Provider     Inpatient consult to Gastroenterology  Once        Provider:  (Not yet assigned)    Completed MADISYN CALDERON     Inpatient consult to Registered Dietitian/Nutritionist  Once        Provider:  (Not yet assigned)    Completed BRENNEN NEWMAN     Inpatient consult to Infectious Diseases  Once        Provider:  (Not yet assigned)    Completed JENNIE TIMMONS            Oncology  Anemia  - Hgb 6.7, baseline ~8  - FOBT positive but no other evidence of overt GI bleeding  - s/p 1U pRBCs in the ED  - will give 80mg protonic IVP x1  - further IV PPI/ GI consult pending H/H trend  - NPO at [facility] as precaution  - holding eliquis   Anemia      Recent Labs   Lab 12/17/23  0337 12/17/23  1959 12/18/23  0556   HGB 8.5* 8.4* 8.9*           Component Value Date/Time    MCV 81 (L) 12/18/2023 0556    RDW 17.3 (H) 12/18/2023 0556    FOLATE 7.7  06/23/2023 1153    [id number] 971 (H) 08/01/2023 0535    OCCULTBLOOD Positive (A) 05/28/2023 0311     Monitor CBC and transfuse if H/H drops below 7/21.    12/17 . per GI, Hb at baseline s/p transfusion. consult GI if evidence of overt bleed .   12/18 follow up in clinic to consider outpatient endoscopy.       Final Active Diagnoses:    Diagnosis Date Noted POA    PRINCIPAL PROBLEM:  Acute encephalopathy [G93.40] 07/15/2023 Yes    Anticoagulated [Z79.01] 12/18/2023 Not Applicable    Chronic anemia [D64.9] 12/18/2023 Yes    Severe malnutrition [E43] 12/18/2023 Yes    Acute cystitis without hematuria [N30.00] 12/16/2023 Yes    Anemia [D64.9] 08/11/2023 Yes    Prolonged Q-T interval on ECG [R94.31] 07/23/2023 Yes    Dysphagia [R13.10] 07/09/2023 Yes    Hypokalemia [E87.6] 07/09/2023 Yes    Chronic pulmonary embolism without acute cor pulmonale [I27.82] 05/22/2023 Yes    Severe late onset Alzheimer's dementia without behavioral disturbance, psychotic disturbance, mood disturbance, or anxiety [G30.1, F02.C0] 05/22/2023 Yes     Chronic    Chronic bilateral pleural effusions [J90] 05/22/2023 Yes    GINA (acute kidney injury) [N17.9] 04/18/2023 Yes    Primary hypertension [I10] 06/10/2022 Yes     Chronic      Problems Resolved During this Admission:       Discharged Condition: fair    Disposition: NH    Follow Up:   Follow-up Information       Agatha Alvarez Follow up.    Specialties: Nursing Home Agency, SNF Agency  Contact information:  [address] [facility] [address] [phone]                           Patient Instructions:      Ambulatory referral/consult to Gastroenterology   Standing Status: Future   Referral Priority: Routine Referral Type: Consultation   Referral Reason: Specialty Services Required   Requested Specialty: Gastroenterology   Number of Visits Requested: 1       Significant Diagnostic Studies: Labs: BMP:   Recent Labs   Lab 12/18/23  0556 12/18/23  1555 12/19/23  0249   GLU 83  "111* 83    140 140   K 2.9* 3.6 3.6    110 110   CO2 24 23 20*   BUN 16 15 12   CREATININE 0.9 0.9 0.9   CALCIUM 8.4* 8.5* 8.5*   MG 1.9  --  1.7   , CMP   Recent Labs   Lab 12/18/23  0556 12/18/23  1555 12/19/23  0249    140 140   K 2.9* 3.6 3.6    110 110   CO2 24 23 20*   GLU 83 111* 83   BUN 16 15 12   CREATININE 0.9 0.9 0.9   CALCIUM 8.4* 8.5* 8.5*   ALBUMIN  --  2.5*  --    ANIONGAP 9 7* 10   , CBC   Recent Labs   Lab 12/17/23  1959 12/18/23  0556 12/19/23  0249   WBC 6.25 5.21 5.69   HGB 8.4* 8.9* 8.6*   HCT 26.0* 27.8* 26.4*    300 284   , INR   Lab Results   Component Value Date    INR 1.1 07/24/2023    INR 1.1 07/08/2023    INR 3.8 (H) 05/29/2023   , Lipid Panel No results found for: "CHOL", "HDL", "LDLCALC", "TRIG", "CHOLHDL", Troponin   Recent Labs   Lab 12/16/23 2001 12/17/23  0216 12/17/23  0337   TROPONINI 0.059* 0.073* 0.063*   , A1C:   Recent Labs   Lab 12/17/23  0337   HGBA1C 5.8*   , and All labs within the past 24 hours have been reviewed  Microbiology: Blood Culture   Lab Results   Component Value Date    LABBLOO No growth after 5 days. 07/23/2023    LABBLOO No growth after 5 days. 07/23/2023   , Sputum Culture No results found for: "GSRESP", "RESPIRATORYC", and Urine Culture    Lab Results   Component Value Date    LABURIN No growth 12/16/2023       CT Head Without Contrast  Narrative: EXAMINATION:  CT HEAD WITHOUT CONTRAST    CLINICAL HISTORY:  Mental status change, unknown cause;    TECHNIQUE:  Low dose axial CT images obtained throughout the head without intravenous contrast. Sagittal and coronal reconstructions were performed.  Examination was repeated due to motion artifact.    COMPARISON:  CT head dated 08/09/2023.    FINDINGS:  Ventricles and sulci are normal in size for age without evidence of hydrocephalus. No extra-axial blood or fluid collections.  Resolution of the previously seen chronic left subdural hematoma.  There are chronic microvascular " ischemic changes, stable.  No parenchymal mass, hemorrhage, edema or major vascular distribution infarct.    No calvarial fracture.  The scalp is unremarkable.  Bilateral paranasal sinuses and mastoid air cells are clear.  Impression: No acute intracranial abnormality.    Electronically signed by: Luis Doran  Date:    12/16/2023  Time:    22:41  X-Ray Chest AP Portable  Narrative: EXAMINATION:  XR CHEST AP PORTABLE    CLINICAL HISTORY:  Altered mental status, unspecified    TECHNIQUE:  Single frontal view of the chest was performed.    COMPARISON:  09/20/2023.    FINDINGS:  Right hemidiaphragm elevation, similar to prior.  Accessory azygous lobe.  Chronic increased markings in the lungs, similar to prior.  Perihilar vascular congestion appears less pronounced.    Heart and lungs otherwise appear unchanged when allowing for differences in technique and positioning.    Retrocardiac hiatal hernia, similar to prior.  Impression: No acute findings.    No significant change from prior study.    Electronically signed by: Olaf Boudreaux MD  Date:    12/16/2023  Time:    21:05       Pending Diagnostic Studies:       None           Medications:  Reconciled Home Medications:      Medication List        Continue taking these medications      acetaminophen 325 MG tablet  Commonly known as: TYLENOL  Take 2 tablets (650 mg total) by mouth every 6 (six) hours as needed (Pain).     albuterol 90 mcg/actuation inhaler  Commonly known as: PROVENTIL HFA  Inhale 2 puffs into the lungs every 4 (four) hours as needed for Wheezing. Use spacer with adult mask     apixaban 5 mg Tab  Commonly known as: ELIQUIS  Take 1 tablet (5 mg total) by mouth 2 (two) times daily. Starting 8/5     cholecalciferol (vitamin D3) 50 mcg (2,000 unit) Tab  Commonly known as: VITAMIN D3  Take 1 tablet (2,000 Units total) by mouth once daily.     EScitalopram oxalate 5 MG Tab  Commonly known as: LEXAPRO  Take 2 tablets (10 mg total) by mouth once daily.      furosemide 20 MG tablet  Commonly known as: LASIX  Take 1 tablet (20 mg total) by mouth once daily.     lisinopriL 40 MG tablet  Commonly known as: PRINIVIL,ZESTRIL  Take 1 tablet (40 mg total) by mouth once daily.     loperamide 2 mg capsule  Commonly known as: IMODIUM  Take 1 capsule (2 mg total) by mouth 4 (four) times daily as needed for Diarrhea.     melatonin 3 mg tablet  Commonly known as: MELATIN  Take 2 tablets (6 mg total) by mouth nightly as needed for Insomnia.     memantine 5 MG Tab  Commonly known as: NAMENDA  Take 1 tablet (5 mg total) by mouth 2 (two) times daily.     metoprolol tartrate 25 MG tablet  Commonly known as: LOPRESSOR  Take 1 tablet (25 mg total) by mouth 2 (two) times daily.     miconazole NITRATE 2 % 2 % top powder  Commonly known as: MICOTIN  Apply topically 2 (two) times daily. Underside of bilateral breasts     polyethylene glycol 17 gram Pwpk  Commonly known as: GLYCOLAX  Take 17 g by mouth 2 (two) times daily as needed.     potassium chloride 10 MEQ Tbsr  Commonly known as: KLOR-CON  Take 1 tablet (10 mEq total) by mouth once daily.              Indwelling Lines/Drains at time of discharge:   Lines/Drains/Airways       None                   Time spent on the discharge of patient: 45minutes         Kory Smith MD  Department of Hospital Medicine  [facility]   Yes

## 2024-01-11 NOTE — PROGRESS NOTE ADULT - PROBLEM/PLAN-2
[facility] [place] [facility]  [facility]  [address]  [address]  [facility] [address]  Dept: [phone]  Dept Fax: [phone]  Loc: [phone]    Elvia Bhatia is a 6 y.o. female who was referred by Keira Meeks A* for:  Chief Complaint   Patient presents with    New Patient     Patient is here today as a new patient for acute pharyngitis. Pt mom states she continues to get strep throat. Mom states that she is always complaining of a headache and some ear pain. Pt mom states she has tested positive for strep 3-4 since the beginning of November. Mom states that she mouth breathes as well.    .    HPI:     There has been difficulty with restful sleep and she frequently tosses and turns at night. She also talks in her sleep and sleep walks.  The family has not been concerned about Elvia’s breathing at night, and has witnessed pauses.  It is easy to arouse the child in the morning, but wakes up with headaches at time. She reports being tired in the morning at times, seems worse since November. Elvia has had difficulty in school/.    Elvia is a mouthbreather, and does have chronic nasal congestion.     She has not had difficulty with ear infections.      The patient has had 4 episodes of strep since November reportedly. The patient has had about 4 episodes per year the last several years reportedly. The mother reports she would take the patient in for evaluation in intermittently and would be diagnosed with strep. The patient gets sent home from school because of headaches. Patient's typical symptoms when she has 'strep' include: Headache, swollen throat, difficult swallowing and some changes in breathing in her nose and mouth. She sometimes has a cough and fever. With the most recent episode in December, the family was told the patient had fluid in the ear as well.    Allergies:  Elvia has not had difficulty with allergies.    Reflux:  
DISPLAY PLAN FREE TEXT

## 2024-03-01 NOTE — ED PROVIDER NOTE - RESPIRATORY, MLM
Sudden numbness or weakness of the face, arm, or leg, especially on one side of the body. Confusion, trouble speaking or understanding. Trouble seeing in one or both eyes. Trouble walking, dizziness, loss of balance or coordination. Severe headache. Breath sounds clear and equal bilaterally.

## 2024-11-20 NOTE — PHYSICAL THERAPY INITIAL EVALUATION ADULT - GAIT TRAINING, PT EVAL
Quality 226: Preventive Care And Screening: Tobacco Use: Screening And Cessation Intervention: Patient screened for tobacco use and is an ex/non-smoker
3. (1-2d) sup. w/  ft
Detail Level: Detailed

## 2024-12-18 NOTE — DISCHARGE NOTE NURSING/CASE MANAGEMENT/SOCIAL WORK - NSDCPETBCESMAN_GEN_ALL_CORE
If you are a smoker, it is important for your health to stop smoking. Please be aware that second hand smoke is also harmful. negative
